# Patient Record
Sex: MALE | Race: WHITE | Employment: OTHER | URBAN - METROPOLITAN AREA
[De-identification: names, ages, dates, MRNs, and addresses within clinical notes are randomized per-mention and may not be internally consistent; named-entity substitution may affect disease eponyms.]

---

## 2017-01-04 DIAGNOSIS — E11.65 TYPE 2 DIABETES MELLITUS WITH HYPERGLYCEMIA, WITH LONG-TERM CURRENT USE OF INSULIN (HCC): ICD-10-CM

## 2017-01-04 DIAGNOSIS — E11.9 TYPE 2 DIABETES MELLITUS WITHOUT COMPLICATION (HCC): ICD-10-CM

## 2017-01-04 DIAGNOSIS — Z79.4 TYPE 2 DIABETES MELLITUS WITH HYPERGLYCEMIA, WITH LONG-TERM CURRENT USE OF INSULIN (HCC): ICD-10-CM

## 2017-01-04 RX ORDER — GLIMEPIRIDE 4 MG/1
4 TABLET ORAL
Qty: 90 TAB | Refills: 3 | Status: SHIPPED | OUTPATIENT
Start: 2017-01-04 | End: 2017-12-21 | Stop reason: SDUPTHER

## 2017-09-01 ENCOUNTER — OP HISTORICAL/CONVERTED ENCOUNTER (OUTPATIENT)
Dept: OTHER | Age: 77
End: 2017-09-01

## 2017-11-20 ENCOUNTER — OP HISTORICAL/CONVERTED ENCOUNTER (OUTPATIENT)
Dept: OTHER | Age: 77
End: 2017-11-20

## 2017-12-21 DIAGNOSIS — E11.65 TYPE 2 DIABETES MELLITUS WITH HYPERGLYCEMIA, WITH LONG-TERM CURRENT USE OF INSULIN (HCC): ICD-10-CM

## 2017-12-21 DIAGNOSIS — Z79.4 TYPE 2 DIABETES MELLITUS WITH HYPERGLYCEMIA, WITH LONG-TERM CURRENT USE OF INSULIN (HCC): ICD-10-CM

## 2017-12-21 RX ORDER — GLIMEPIRIDE 4 MG/1
TABLET ORAL
Qty: 90 TAB | Refills: 3 | Status: SHIPPED | OUTPATIENT
Start: 2017-12-21 | End: 2019-07-25

## 2018-01-17 ENCOUNTER — APPOINTMENT (OUTPATIENT)
Dept: GENERAL RADIOLOGY | Age: 78
End: 2018-01-17
Attending: EMERGENCY MEDICINE
Payer: MEDICARE

## 2018-01-17 ENCOUNTER — HOSPITAL ENCOUNTER (EMERGENCY)
Age: 78
Discharge: HOME | End: 2018-01-17
Attending: EMERGENCY MEDICINE | Admitting: EMERGENCY MEDICINE
Payer: MEDICARE

## 2018-01-17 VITALS
BODY MASS INDEX: 32.64 KG/M2 | DIASTOLIC BLOOD PRESSURE: 73 MMHG | HEART RATE: 77 BPM | SYSTOLIC BLOOD PRESSURE: 174 MMHG | WEIGHT: 228 LBS | HEIGHT: 70 IN | OXYGEN SATURATION: 97 % | RESPIRATION RATE: 18 BRPM | TEMPERATURE: 98 F

## 2018-01-17 DIAGNOSIS — S52.532A CLOSED COLLES' FRACTURE OF LEFT RADIUS, INITIAL ENCOUNTER: Primary | ICD-10-CM

## 2018-01-17 PROCEDURE — 74011000250 HC RX REV CODE- 250: Performed by: EMERGENCY MEDICINE

## 2018-01-17 PROCEDURE — 73100 X-RAY EXAM OF WRIST: CPT

## 2018-01-17 PROCEDURE — 74011250637 HC RX REV CODE- 250/637: Performed by: EMERGENCY MEDICINE

## 2018-01-17 PROCEDURE — 99285 EMERGENCY DEPT VISIT HI MDM: CPT

## 2018-01-17 PROCEDURE — 73110 X-RAY EXAM OF WRIST: CPT

## 2018-01-17 PROCEDURE — 75810000303 HC CLSD TRMT  FRACTURE/DISLOCATION W/  ANES

## 2018-01-17 PROCEDURE — A4565 SLINGS: HCPCS

## 2018-01-17 PROCEDURE — 77030028224 HC PDNG CST BSNM -A

## 2018-01-17 RX ORDER — ACETAMINOPHEN AND CODEINE PHOSPHATE 300; 30 MG/1; MG/1
1 TABLET ORAL
Status: DISCONTINUED | OUTPATIENT
Start: 2018-01-17 | End: 2018-01-17

## 2018-01-17 RX ORDER — LIDOCAINE HYDROCHLORIDE 10 MG/ML
5 INJECTION, SOLUTION EPIDURAL; INFILTRATION; INTRACAUDAL; PERINEURAL ONCE
Status: COMPLETED | OUTPATIENT
Start: 2018-01-17 | End: 2018-01-17

## 2018-01-17 RX ORDER — ACETAMINOPHEN AND CODEINE PHOSPHATE 300; 30 MG/1; MG/1
1 TABLET ORAL
Status: COMPLETED | OUTPATIENT
Start: 2018-01-17 | End: 2018-01-17

## 2018-01-17 RX ORDER — ACETAMINOPHEN AND CODEINE PHOSPHATE 300; 30 MG/1; MG/1
1 TABLET ORAL
Qty: 30 TAB | Refills: 0 | Status: SHIPPED | OUTPATIENT
Start: 2018-01-17 | End: 2018-01-19

## 2018-01-17 RX ORDER — BUPIVACAINE HYDROCHLORIDE 5 MG/ML
5 INJECTION, SOLUTION EPIDURAL; INTRACAUDAL ONCE
Status: COMPLETED | OUTPATIENT
Start: 2018-01-17 | End: 2018-01-17

## 2018-01-17 RX ADMIN — LIDOCAINE HYDROCHLORIDE 5 ML: 10 INJECTION, SOLUTION EPIDURAL; INFILTRATION; INTRACAUDAL; PERINEURAL at 20:36

## 2018-01-17 RX ADMIN — BUPIVACAINE HYDROCHLORIDE 25 MG: 5 INJECTION, SOLUTION EPIDURAL; INTRACAUDAL at 20:36

## 2018-01-17 RX ADMIN — ACETAMINOPHEN AND CODEINE PHOSPHATE 1 TABLET: 300; 30 TABLET ORAL at 20:14

## 2018-01-18 NOTE — ED NOTES
I have reviewed discharge instructions with the patient. The patient verbalized understanding. Pt confirmed understanding of need for follow up with primary care provider. Pt is not in any current distress and shows no evidence of clinical instability. Pt left ambulatory with nurse  Pt family/friends are present. Pt left with all personal belongings. Pt states they are not driving. Pt states chief complaint has improved with treatment provided    PT is alert and oriented x 4, Pt is hemodynamically/respiratorily stable. Paperwork given by provider and reviewed with patient, opportunity for questions/clarification given.

## 2018-01-18 NOTE — DISCHARGE INSTRUCTIONS
Broken Arm: Care Instructions  Your Care Instructions  Fractures can range from a small, hairline crack, to a bone or bones broken into two or more pieces. Your treatment depends on how bad the break is. Your doctor may have put your arm in a splint or cast to allow it to heal or to keep it stable until you see another doctor. It may take weeks or months for your arm to heal. You can help your arm heal with some care at home. You heal best when you take good care of yourself. Eat a variety of healthy foods, and don't smoke. You may have had a sedative to help you relax. You may be unsteady after having sedation. It can take a few hours for the medicine's effects to wear off. Common side effects of sedation include nausea, vomiting, and feeling sleepy or tired. The doctor has checked you carefully, but problems can develop later. If you notice any problems or new symptoms, get medical treatment right away. Follow-up care is a key part of your treatment and safety. Be sure to make and go to all appointments, and call your doctor if you are having problems. It's also a good idea to know your test results and keep a list of the medicines you take. How can you care for yourself at home? · If the doctor gave you a sedative:  ¨ For 24 hours, don't do anything that requires attention to detail. It takes time for the medicine's effects to completely wear off. ¨ For your safety, do not drive or operate any machinery that could be dangerous. Wait until the medicine wears off and you can think clearly and react easily. · Put ice or a cold pack on your arm for 10 to 20 minutes at a time. Try to do this every 1 to 2 hours for the next 3 days (when you are awake). Put a thin cloth between the ice and your cast or splint. Keep the cast or splint dry. · Follow the cast care instructions your doctor gives you. If you have a splint, do not take it off unless your doctor tells you to. · Be safe with medicines.  Take pain medicines exactly as directed. ¨ If the doctor gave you a prescription medicine for pain, take it as prescribed. ¨ If you are not taking a prescription pain medicine, ask your doctor if you can take an over-the-counter medicine. · Prop up your arm on pillows when you sit or lie down in the first few days after the injury. Keep the arm higher than the level of your heart. This will help reduce swelling. · Follow instructions for exercises to keep your arm strong. · Wiggle your fingers and wrist often to reduce swelling and stiffness. When should you call for help? Call 911 anytime you think you may need emergency care. For example, call if:  ? · You are very sleepy and you have trouble waking up. ?Call your doctor now or seek immediate medical care if:  ? · You have new or worse nausea or vomiting. ? · You have new or worse pain. ? · Your hand or fingers are cool or pale or change color. ? · Your cast or splint feels too tight. ? · You have tingling, weakness, or numbness in your hand or fingers. ? Watch closely for changes in your health, and be sure to contact your doctor if:  ? · You do not get better as expected. ? · You have problems with your cast or splint. Where can you learn more? Go to http://nito-maria de jesus.info/. Enter O050 in the search box to learn more about \"Broken Arm: Care Instructions. \"  Current as of: March 21, 2017  Content Version: 11.4  © 3664-0337 [employer]. Care instructions adapted under license by [employer] (which disclaims liability or warranty for this information). If you have questions about a medical condition or this instruction, always ask your healthcare professional. [place] [address] any warranty or liability for your use of this information.

## 2018-01-18 NOTE — ED PROVIDER NOTES
HPI Comments: 76 y.o. male with past medical history significant for DM, HTN, hyperlipidemia and BPH who presents from [facility] via EMS with chief complaint of wrist pain. Per pt, he was at a [place] home this evening around [facility], when he slipped on ice in the parking lot. The pt reports that he extended his left arm to break his fall, and upon impact, experienced moderate pain over his left wrist. Per pt, he did not hit his head or become syncopal during the fall. Pt makes it known that he is not currently on blood thinning medication. EMS report that the pt had positive sensation over his left wrist en route to the ED and able to move all digits over his hand per usual. Palpable radial pulse to the left noted at that time. Currently while in the ED, the pt rates his wrist pain 4/10. Per pt, he has hx of shoulder operation about eight years ago which was done by Dr. Massimo Mahan. The pt denies fever, chills, N/V/D, CP, SOB, shoulder pain, back pain, neck pain, hip pain, headache, dizziness, syncope, numbness and wound. There are no other acute medical concerns at this time. Social hx: Non-smoker, No ETOH consumption     PCP: Rosibel Deshpande MD    Note written by Deven Awan, as dictated by Lenora Aguilar MD 7:45 PM          The history is provided by the patient. No  was used.         Past Medical History:   Diagnosis Date    BPH (benign prostatic hyperplasia)     Diabetes ([address]     HTN (hypertension)     Hyperlipidemia     Type II or unspecified type diabetes mellitus without mention of complication, uncontrolled        Past Surgical History:   Procedure Laterality Date    HX ORTHOPAEDIC  L shoulder repair    HX ORTHOPAEDIC      Left shoulder         Family History:   Problem Relation Age of Onset    Diabetes Mother     Cancer Mother     Diabetes Father     Diabetes Sister        Social History     Social History    Marital status:      Spouse name: N/A   [facility] Number of children: N/A    Years of education: N/A     Occupational History    Not on file. Social History Main Topics    Smoking status: Never Smoker    Smokeless tobacco: Never Used    Alcohol use No    Drug use: No    Sexual activity: Not on file     Other Topics Concern    Not on file     Social History Narrative    ** Merged History Encounter **              ALLERGIES: Review of patient's allergies indicates no known allergies. Review of Systems   Constitutional: Negative for activity change, chills and fever. Eyes: Negative for pain. Respiratory: Negative for cough and shortness of breath. Cardiovascular: Negative for chest pain and leg swelling. Gastrointestinal: Negative for abdominal pain, diarrhea, nausea and vomiting. Genitourinary: Negative for flank pain and hematuria. Musculoskeletal: Positive for arthralgias (mild-moderate pain over left wrist following GLF on ice while in parking lot this evening  ). Negative for back pain, gait problem, neck pain and neck stiffness. Skin: Negative for color change. Neurological: Negative for dizziness, syncope, speech difficulty, numbness and headaches. Hematological: Does not bruise/bleed easily. Psychiatric/Behavioral: Negative for confusion. All other systems reviewed and are negative. Vitals:    01/17/18 1941 01/17/18 1942   BP: (!) 209/97 (!) 209/97   Pulse:  77   Resp:  18   Temp:  98 °F (36.7 °C)   SpO2:  98%   Weight:  103.4 kg (228 lb)   Height:  5' 10\" (1.778 m)            Physical Exam   Constitutional: He is oriented to person, place, and time. He appears well-developed and well-nourished. No distress. HENT:   Head: Normocephalic and atraumatic. Right Ear: External ear normal.   Left Ear: External ear normal.   Eyes: EOM are normal. Pupils are equal, round, and reactive to light. Neck: Normal range of motion. Neck supple. No JVD present. No tracheal deviation present.    Cardiovascular: Normal rate, regular rhythm and normal heart sounds. Exam reveals no gallop and no friction rub. No murmur heard. Pulmonary/Chest: Effort normal and breath sounds normal. No stridor. No respiratory distress. He has no wheezes. He has no rales. Abdominal: Soft. Bowel sounds are normal. He exhibits no distension. There is no tenderness. There is no rebound and no guarding. Musculoskeletal: Normal range of motion. He exhibits tenderness and deformity. He exhibits no edema. Pain to palpation over left radius. Slight dorsal deformity. Palpable radial pulse to the left. Neurological: He is alert and oriented to person, place, and time. He has normal reflexes. No cranial nerve deficit. Coordination normal.   Normal hand  and sensation to the LUE. Skin: Skin is warm and dry. No rash noted. He is not diaphoretic. No erythema. Psychiatric: He has a normal mood and affect. His behavior is normal. Judgment and thought content normal.   Nursing note and vitals reviewed. Note written by Deven Jett, as dictated by Kendra Giron MD 7:45 PM    [facility]  ED Course       Reduction of Joint  Date/Time: 1/17/2018 8:38 PM  Performed by: Ry Dorman by: Cecille Villalobos     Consent:     Consent obtained:  Verbal    Consent given by:  Patient  Injury:     Injury location:  Wrist    Wrist injury location:  L wrist  Pre-procedure assessment:     Neurological function: normal      Distal perfusion: normal      Range of motion: normal    Anesthesia (see MAR for exact dosages):      Anesthesia method:  Local infiltration (Hematoma block using a 10 ml 50/50 ratio of 1% lidocaine and .5% bupivacaine)    Local anesthetic:  Bupivacaine 0.5% w/o epi and lidocaine 1% w/o epi  Procedure details:     Manipulation performed: yes      Reduction successful: yes      X-ray confirmed reduction: yes      Immobilization:  Splint    Splint type:  Sugar tong  Post-procedure assessment:     Neurological function: normal      Distal perfusion: normal      Range of motion: normal      Patient tolerance of procedure: Tolerated well, no immediate complications      Splint, Sugar Tong  Date/Time: 1/17/2018 8:55 PM  Performed by: Gloria Barraza by: Rommel Torres     Consent:     Consent obtained:  Verbal    Consent given by:  Patient    Risks discussed:  Discoloration, numbness and pain    Alternatives discussed:  No treatment  Pre-procedure details:     Sensation:  Normal  Procedure details:     Laterality:  Left    Location:  Arm    Arm:  L lower arm    Splint type:  Sugar tong    Supplies:  Cotton padding and Ortho-Glass  Post-procedure details:     Pain:  Improved    Patient tolerance of procedure: Tolerated well, no immediate complications  Nerve Block  Date/Time: 1/17/2018 8:56 PM  Performed by: Rommel Torres  Authorized by: Rommel Torres     Consent:     Consent obtained:  Verbal    Consent given by:  Patient    Risks discussed:  Infection, allergic reaction and bleeding  Indications:     Indications:  Pain relief  Location:     Body area:  Upper extremity (Left wrist hematoma block)    Laterality:  Left  Pre-procedure details:     Skin preparation:  Alcohol  Procedure details (see MAR for exact dosages): Block needle gauge:  25 G    Anesthetic injected:  Bupivacaine 0.5% WITH epi and lidocaine 1% w/o epi    Steroid injected:  None    Additive injected:  None    Injection procedure:  Anatomic landmarks identified  Post-procedure details:     Outcome:  Anesthesia achieved    Patient tolerance of procedure: Tolerated well, no immediate complications        CONSULT NOTE:  8:36 PM Miriam Moura MD spoke with Dr. Chrissie Abdi [facility] for [facility]opedics. Discussed available diagnostic tests and clinical findings. They are in agreement with care plans as outlined. Dr. Chrissie Abdi is asking if we can reduce the fx in the ED. PROGRESS NOTE:  8:40 PM   Pt's left arm now hanging in finger trap for reduction. 8:57 PM  Left wrist reduced after hematoma block. I splinted left wrist with sugar tong splint. Post reduction pending. 9:16 PM  Post reduction xrays show improved alignment    Home with ortho f/u. Pt has seen Dr Shanta Bustillo in the past and I will give Dr Thalia Curling as an option too    Good return precautions given to patient. Close follow up with ortho recommended. Patient and/or family voices understanding of this plan. Discharge instructions were explained by me and all concerns were addressed.

## 2018-01-18 NOTE — ED NOTES
Assumed care of pt from [address] [place] [address]. Pt presents to ED with chief complaint of left wrist pain. Pt reports he slipped in a parking lot on the ice. Pt reports he did not get dizzy prior to the fall. Pt denies hitting his head and denies LOC. Pt denies being on blood thinners. Pt has positive sensation to left upper extremity and presents with palpable radial pulses to left wrist. Pt is A&O x 4. Pt denies any other symptoms at this time. Pt resting comfortably on the stretcher in a position of comfort. Pt in no acute distress at this time. Call bell within reach. Side rails x 2. Cardiac monitor x 2. Stretcher locked in the lowest position. Pt aware of plan to await for MD/PA-C/NP assessment, and pt/family verbalizes understanding. Will continue to monitor. Pt provided with ice pack. MD at pt's bedside.

## 2018-01-19 ENCOUNTER — ANESTHESIA (OUTPATIENT)
Dept: SURGERY | Age: 78
End: 2018-01-19
Payer: MEDICARE

## 2018-01-19 ENCOUNTER — ANESTHESIA EVENT (OUTPATIENT)
Dept: SURGERY | Age: 78
End: 2018-01-19
Payer: MEDICARE

## 2018-01-19 ENCOUNTER — APPOINTMENT (OUTPATIENT)
Dept: GENERAL RADIOLOGY | Age: 78
End: 2018-01-19
Attending: ORTHOPAEDIC SURGERY
Payer: MEDICARE

## 2018-01-19 ENCOUNTER — HOSPITAL ENCOUNTER (OUTPATIENT)
Age: 78
Setting detail: OUTPATIENT SURGERY
Discharge: HOME | End: 2018-01-19
Attending: ORTHOPAEDIC SURGERY | Admitting: ORTHOPAEDIC SURGERY
Payer: MEDICARE

## 2018-01-19 VITALS
SYSTOLIC BLOOD PRESSURE: 131 MMHG | OXYGEN SATURATION: 100 % | RESPIRATION RATE: 13 BRPM | BODY MASS INDEX: 30.86 KG/M2 | DIASTOLIC BLOOD PRESSURE: 60 MMHG | HEART RATE: 68 BPM | HEIGHT: 71 IN | TEMPERATURE: 97.5 F | WEIGHT: 220.46 LBS

## 2018-01-19 DIAGNOSIS — S52.572A OTHER CLOSED INTRA-ARTICULAR FRACTURE OF DISTAL END OF LEFT RADIUS, INITIAL ENCOUNTER: Primary | ICD-10-CM

## 2018-01-19 LAB
ANION GAP BLD CALC-SCNC: 18 MMOL/L (ref 5–15)
BUN BLD-MCNC: 27 MG/DL (ref 9–20)
CA-I BLD-MCNC: 1.17 MMOL/L (ref 1.12–1.32)
CHLORIDE BLD-SCNC: 101 MMOL/L (ref 98–107)
CO2 BLD-SCNC: 25 MMOL/L (ref 21–32)
CREAT BLD-MCNC: 1.7 MG/DL (ref 0.6–1.3)
GLUCOSE BLD STRIP.AUTO-MCNC: 104 MG/DL (ref 65–100)
GLUCOSE BLD STRIP.AUTO-MCNC: 60 MG/DL (ref 65–100)
GLUCOSE BLD STRIP.AUTO-MCNC: 69 MG/DL (ref 65–100)
GLUCOSE BLD-MCNC: 79 MG/DL (ref 65–100)
HCT VFR BLD CALC: 36 % (ref 36.6–50.3)
HGB BLD-MCNC: 12.2 GM/DL (ref 12.1–17)
POTASSIUM BLD-SCNC: 3.7 MMOL/L (ref 3.5–5.1)
SERVICE CMNT-IMP: ABNORMAL
SERVICE CMNT-IMP: NORMAL
SODIUM BLD-SCNC: 139 MMOL/L (ref 136–145)

## 2018-01-19 PROCEDURE — 74011250636 HC RX REV CODE- 250/636

## 2018-01-19 PROCEDURE — 77030035361 HC BIT DRL SLD PA GEMNS SKEL -B: Performed by: ORTHOPAEDIC SURGERY

## 2018-01-19 PROCEDURE — 77030002933 HC SUT MCRYL J&J -A: Performed by: ORTHOPAEDIC SURGERY

## 2018-01-19 PROCEDURE — C1713 ANCHOR/SCREW BN/BN,TIS/BN: HCPCS | Performed by: ORTHOPAEDIC SURGERY

## 2018-01-19 PROCEDURE — 74011000250 HC RX REV CODE- 250

## 2018-01-19 PROCEDURE — 76210000020 HC REC RM PH II FIRST 0.5 HR: Performed by: ORTHOPAEDIC SURGERY

## 2018-01-19 PROCEDURE — 77030031388 HC WRE K SKEL -B: Performed by: ORTHOPAEDIC SURGERY

## 2018-01-19 PROCEDURE — 77030020782 HC GWN BAIR PAWS FLX 3M -B

## 2018-01-19 PROCEDURE — 74011250636 HC RX REV CODE- 250/636: Performed by: ANESTHESIOLOGY

## 2018-01-19 PROCEDURE — 82962 GLUCOSE BLOOD TEST: CPT

## 2018-01-19 PROCEDURE — 74011250636 HC RX REV CODE- 250/636: Performed by: ORTHOPAEDIC SURGERY

## 2018-01-19 PROCEDURE — 76000 FLUOROSCOPY <1 HR PHYS/QHP: CPT

## 2018-01-19 PROCEDURE — 76010000153 HC OR TIME 1.5 TO 2 HR: Performed by: ORTHOPAEDIC SURGERY

## 2018-01-19 PROCEDURE — 77030021122 HC SPLNT MAT FST BSNM -A: Performed by: ORTHOPAEDIC SURGERY

## 2018-01-19 PROCEDURE — 77030020268 HC MISC GENERAL SUPPLY: Performed by: ORTHOPAEDIC SURGERY

## 2018-01-19 PROCEDURE — 77030038840 HC GD AIM SKEL -B: Performed by: ORTHOPAEDIC SURGERY

## 2018-01-19 PROCEDURE — 77030003601 HC NDL NRV BLK BBMI -A

## 2018-01-19 PROCEDURE — L4398 FOOT DROP SPLINT PRE OTS: HCPCS | Performed by: ORTHOPAEDIC SURGERY

## 2018-01-19 PROCEDURE — 77030031139 HC SUT VCRL2 J&J -A: Performed by: ORTHOPAEDIC SURGERY

## 2018-01-19 PROCEDURE — 80047 BASIC METABLC PNL IONIZED CA: CPT

## 2018-01-19 PROCEDURE — 76060000034 HC ANESTHESIA 1.5 TO 2 HR: Performed by: ORTHOPAEDIC SURGERY

## 2018-01-19 PROCEDURE — 77030035360 HC BIT DRL SKEL -B: Performed by: ORTHOPAEDIC SURGERY

## 2018-01-19 PROCEDURE — 77030000032 HC CUF TRNQT ZIMM -B: Performed by: ORTHOPAEDIC SURGERY

## 2018-01-19 PROCEDURE — 76210000006 HC OR PH I REC 0.5 TO 1 HR: Performed by: ORTHOPAEDIC SURGERY

## 2018-01-19 DEVICE — SCR CORT LCK 3.5X14MM -- GEMINUS: Type: IMPLANTABLE DEVICE | Site: ULNA | Status: FUNCTIONAL

## 2018-01-19 DEVICE — PEG BNE FIX L20MM DIA2MM DST RAD TI SMOOTH FOR VOLAR: Type: IMPLANTABLE DEVICE | Site: ULNA | Status: FUNCTIONAL

## 2018-01-19 DEVICE — SCR CORT LCK 3.5X11MM --: Type: IMPLANTABLE DEVICE | Site: ULNA | Status: FUNCTIONAL

## 2018-01-19 DEVICE — IMPLANTABLE DEVICE: Type: IMPLANTABLE DEVICE | Site: ULNA | Status: FUNCTIONAL

## 2018-01-19 DEVICE — PEG BNE FIX L22MM DIA2MM DST RAD TI SMOOTH FOR VOLAR: Type: IMPLANTABLE DEVICE | Site: ULNA | Status: FUNCTIONAL

## 2018-01-19 DEVICE — PEG BNE FIX L20MM DIA2.3MM DST RAD TI THRD LOK FOR VOLAR: Type: IMPLANTABLE DEVICE | Site: ULNA | Status: FUNCTIONAL

## 2018-01-19 DEVICE — PEG BNE FIX L22MM DIA2.7MM DST RAD TI THRD NONLOCKING FOR: Type: IMPLANTABLE DEVICE | Site: ULNA | Status: FUNCTIONAL

## 2018-01-19 RX ORDER — CEPHALEXIN 500 MG/1
500 CAPSULE ORAL 4 TIMES DAILY
Qty: 12 CAP | Refills: 0 | Status: SHIPPED | OUTPATIENT
Start: 2018-01-19 | End: 2018-01-22

## 2018-01-19 RX ORDER — SODIUM CHLORIDE, SODIUM LACTATE, POTASSIUM CHLORIDE, CALCIUM CHLORIDE 600; 310; 30; 20 MG/100ML; MG/100ML; MG/100ML; MG/100ML
150 INJECTION, SOLUTION INTRAVENOUS CONTINUOUS
Status: DISCONTINUED | OUTPATIENT
Start: 2018-01-19 | End: 2018-01-19 | Stop reason: HOSPADM

## 2018-01-19 RX ORDER — CEFAZOLIN SODIUM IN 0.9 % NACL 2 G/50 ML
2 INTRAVENOUS SOLUTION, PIGGYBACK (ML) INTRAVENOUS ONCE
Status: DISCONTINUED | OUTPATIENT
Start: 2018-01-19 | End: 2018-01-19 | Stop reason: HOSPADM

## 2018-01-19 RX ORDER — SODIUM CHLORIDE, SODIUM LACTATE, POTASSIUM CHLORIDE, CALCIUM CHLORIDE 600; 310; 30; 20 MG/100ML; MG/100ML; MG/100ML; MG/100ML
125 INJECTION, SOLUTION INTRAVENOUS CONTINUOUS
Status: DISCONTINUED | OUTPATIENT
Start: 2018-01-19 | End: 2018-01-19 | Stop reason: HOSPADM

## 2018-01-19 RX ORDER — ROPIVACAINE HYDROCHLORIDE 5 MG/ML
INJECTION, SOLUTION EPIDURAL; INFILTRATION; PERINEURAL AS NEEDED
Status: DISCONTINUED | OUTPATIENT
Start: 2018-01-19 | End: 2018-01-19 | Stop reason: HOSPADM

## 2018-01-19 RX ORDER — LIDOCAINE HYDROCHLORIDE 10 MG/ML
0.1 INJECTION, SOLUTION EPIDURAL; INFILTRATION; INTRACAUDAL; PERINEURAL AS NEEDED
Status: DISCONTINUED | OUTPATIENT
Start: 2018-01-19 | End: 2018-01-19 | Stop reason: HOSPADM

## 2018-01-19 RX ORDER — SODIUM CHLORIDE, SODIUM LACTATE, POTASSIUM CHLORIDE, CALCIUM CHLORIDE 600; 310; 30; 20 MG/100ML; MG/100ML; MG/100ML; MG/100ML
INJECTION, SOLUTION INTRAVENOUS
Status: DISCONTINUED | OUTPATIENT
Start: 2018-01-19 | End: 2018-01-19 | Stop reason: HOSPADM

## 2018-01-19 RX ORDER — CEFAZOLIN SODIUM IN 0.9 % NACL 2 G/50 ML
2 INTRAVENOUS SOLUTION, PIGGYBACK (ML) INTRAVENOUS ONCE
Status: COMPLETED | OUTPATIENT
Start: 2018-01-19 | End: 2018-01-19

## 2018-01-19 RX ORDER — ONDANSETRON 2 MG/ML
4 INJECTION INTRAMUSCULAR; INTRAVENOUS AS NEEDED
Status: DISCONTINUED | OUTPATIENT
Start: 2018-01-19 | End: 2018-01-19 | Stop reason: HOSPADM

## 2018-01-19 RX ORDER — MIDAZOLAM HYDROCHLORIDE 1 MG/ML
INJECTION, SOLUTION INTRAMUSCULAR; INTRAVENOUS AS NEEDED
Status: DISCONTINUED | OUTPATIENT
Start: 2018-01-19 | End: 2018-01-19 | Stop reason: HOSPADM

## 2018-01-19 RX ORDER — CEFAZOLIN SODIUM IN 0.9 % NACL 2 G/50 ML
2 INTRAVENOUS SOLUTION, PIGGYBACK (ML) INTRAVENOUS
Status: COMPLETED | OUTPATIENT
Start: 2018-01-19 | End: 2018-01-19

## 2018-01-19 RX ORDER — ONDANSETRON 8 MG/1
8 TABLET, ORALLY DISINTEGRATING ORAL
Qty: 20 TAB | Refills: 2 | OUTPATIENT
Start: 2018-01-19 | End: 2018-06-21

## 2018-01-19 RX ORDER — ALBUTEROL SULFATE 0.83 MG/ML
2.5 SOLUTION RESPIRATORY (INHALATION) AS NEEDED
Status: DISCONTINUED | OUTPATIENT
Start: 2018-01-19 | End: 2018-01-19 | Stop reason: HOSPADM

## 2018-01-19 RX ORDER — PROPOFOL 10 MG/ML
INJECTION, EMULSION INTRAVENOUS
Status: DISCONTINUED | OUTPATIENT
Start: 2018-01-19 | End: 2018-01-19 | Stop reason: HOSPADM

## 2018-01-19 RX ORDER — OXYCODONE AND ACETAMINOPHEN 5; 325 MG/1; MG/1
TABLET ORAL
Qty: 80 TAB | Refills: 0 | OUTPATIENT
Start: 2018-01-19 | End: 2018-06-21

## 2018-01-19 RX ORDER — HYDROMORPHONE HYDROCHLORIDE 2 MG/ML
0.5 INJECTION, SOLUTION INTRAMUSCULAR; INTRAVENOUS; SUBCUTANEOUS
Status: DISCONTINUED | OUTPATIENT
Start: 2018-01-19 | End: 2018-01-19 | Stop reason: HOSPADM

## 2018-01-19 RX ORDER — DIPHENHYDRAMINE HYDROCHLORIDE 50 MG/ML
12.5 INJECTION, SOLUTION INTRAMUSCULAR; INTRAVENOUS AS NEEDED
Status: DISCONTINUED | OUTPATIENT
Start: 2018-01-19 | End: 2018-01-19 | Stop reason: HOSPADM

## 2018-01-19 RX ORDER — FENTANYL CITRATE 50 UG/ML
INJECTION, SOLUTION INTRAMUSCULAR; INTRAVENOUS AS NEEDED
Status: DISCONTINUED | OUTPATIENT
Start: 2018-01-19 | End: 2018-01-19 | Stop reason: HOSPADM

## 2018-01-19 RX ORDER — GLYCOPYRROLATE 0.2 MG/ML
INJECTION INTRAMUSCULAR; INTRAVENOUS AS NEEDED
Status: DISCONTINUED | OUTPATIENT
Start: 2018-01-19 | End: 2018-01-19 | Stop reason: HOSPADM

## 2018-01-19 RX ORDER — ASCORBIC ACID 500 MG
500 TABLET ORAL DAILY
Qty: 42 TAB | Refills: 0 | OUTPATIENT
Start: 2018-01-19 | End: 2018-03-02

## 2018-01-19 RX ADMIN — PROPOFOL 50 MCG/KG/MIN: 10 INJECTION, EMULSION INTRAVENOUS at 16:13

## 2018-01-19 RX ADMIN — SODIUM CHLORIDE, SODIUM LACTATE, POTASSIUM CHLORIDE, AND CALCIUM CHLORIDE 150 ML/HR: 600; 310; 30; 20 INJECTION, SOLUTION INTRAVENOUS at 15:32

## 2018-01-19 RX ADMIN — FENTANYL CITRATE 25 MCG: 50 INJECTION, SOLUTION INTRAMUSCULAR; INTRAVENOUS at 16:18

## 2018-01-19 RX ADMIN — CEFAZOLIN 2 G: 1 INJECTION, POWDER, FOR SOLUTION INTRAMUSCULAR; INTRAVENOUS; PARENTERAL at 18:08

## 2018-01-19 RX ADMIN — SODIUM CHLORIDE, SODIUM LACTATE, POTASSIUM CHLORIDE, CALCIUM CHLORIDE: 600; 310; 30; 20 INJECTION, SOLUTION INTRAVENOUS at 16:08

## 2018-01-19 RX ADMIN — MIDAZOLAM HYDROCHLORIDE 2 MG: 1 INJECTION, SOLUTION INTRAMUSCULAR; INTRAVENOUS at 15:37

## 2018-01-19 RX ADMIN — MIDAZOLAM HYDROCHLORIDE 1 MG: 1 INJECTION, SOLUTION INTRAMUSCULAR; INTRAVENOUS at 16:06

## 2018-01-19 RX ADMIN — MIDAZOLAM HYDROCHLORIDE 1 MG: 1 INJECTION, SOLUTION INTRAMUSCULAR; INTRAVENOUS at 16:18

## 2018-01-19 RX ADMIN — GLYCOPYRROLATE 0.1 MG: 0.2 INJECTION INTRAMUSCULAR; INTRAVENOUS at 16:54

## 2018-01-19 RX ADMIN — FENTANYL CITRATE 50 MCG: 50 INJECTION, SOLUTION INTRAMUSCULAR; INTRAVENOUS at 15:43

## 2018-01-19 RX ADMIN — ROPIVACAINE HYDROCHLORIDE 30 ML: 5 INJECTION, SOLUTION EPIDURAL; INFILTRATION; PERINEURAL at 15:45

## 2018-01-19 RX ADMIN — SODIUM CHLORIDE, SODIUM LACTATE, POTASSIUM CHLORIDE, CALCIUM CHLORIDE: 600; 310; 30; 20 INJECTION, SOLUTION INTRAVENOUS at 16:45

## 2018-01-19 RX ADMIN — FENTANYL CITRATE 50 MCG: 50 INJECTION, SOLUTION INTRAMUSCULAR; INTRAVENOUS at 15:37

## 2018-01-19 RX ADMIN — FENTANYL CITRATE 50 MCG: 50 INJECTION, SOLUTION INTRAMUSCULAR; INTRAVENOUS at 16:06

## 2018-01-19 RX ADMIN — CEFAZOLIN 2 G: 1 INJECTION, POWDER, FOR SOLUTION INTRAMUSCULAR; INTRAVENOUS; PARENTERAL at 16:08

## 2018-01-19 RX ADMIN — GLYCOPYRROLATE 0.1 MG: 0.2 INJECTION INTRAMUSCULAR; INTRAVENOUS at 17:11

## 2018-01-19 NOTE — H&P
Orthopedic Admission History and Physical        NAME: Brynn Narayan       :  1940       MRN:  [id number]      Subjective:     Patient is a 76 y.o. male who presents with history of L DRF and middle phalanx fx long finger. Presents today for surgical treatment. Patient Active Problem List    Diagnosis Date Noted    Encounter for long-term (current) use of insulin ([facility] 2016    Hypothyroidism due to acquired atrophy of thyroid 2015    Essential hypertension 2015    Type 2 diabetes mellitus with diabetic nephropathy ([facility] 2015    Obesity 2015    Type II or unspecified type diabetes mellitus without mention of complication, uncontrolled 2014     Past Medical History:   Diagnosis Date    BPH (benign prostatic hyperplasia)     Diabetes ([facility]     HTN (hypertension)     Hyperlipidemia     Type II or unspecified type diabetes mellitus without mention of complication, uncontrolled       Past Surgical History:   Procedure Laterality Date    HX ORTHOPAEDIC  L shoulder repair    HX ORTHOPAEDIC      Left shoulder      Prior to Admission medications    Medication Sig Start Date End Date Taking? Authorizing Provider   acetaminophen-codeine (TYLENOL-CODEINE #3) 300-30 mg per tablet Take 1 Tab by mouth every six (6) hours as needed for Pain. Max Daily Amount: 4 Tabs.  18   Bartolome Castro MD   glimepiride (AMARYL) 4 mg tablet TAKE 1 TABLET EVERY MORNING 17   Jasper Saleh MD   LANTUS SOLOSTAR 100 unit/mL (3 mL) pen INJECT 24 UNITS AT 9PM 16   Jasper Saleh MD   insulin glargine (LANTUS SOLOSTAR) 100 unit/mL (3 mL) pen Inject 40 units at  9 PM 16   Jasper Saleh MD   atenolol (TENORMIN) 50 mg tablet  16   Historical Provider   fenofibrate micronized (LOFIBRA) 134 mg capsule  2/3/16   Historical Provider   RUBENS PEN NEEDLE 32 x 5/32 \" ndle USE TO INJECT LANTUTS DAILY 9/27/15   Jasper Saleh MD   metFORMIN (GLUCOPHAGE) 1,000 mg tablet Take 1 Tab by mouth two (2) times daily (with meals). Patient taking differently: Take 1,000 mg by mouth daily (with dinner). 8/3/15   Spenser Orr MD   insulin lispro (HUMALOG) 100 unit/mL kwikpen Sample 2/20/15   Spenser rOr MD   Lancets misc Use to check blood sugar 3 times daily 11/18/14   Spenser Orr MD   glucose blood VI test strips (ONE TOUCH ULTRA TEST) strip Use to test blood sugar 3 times daily 11/18/14   Spenser Orr MD   glucose blood VI test strips (ASCENSIA AUTODISC VI, ONE TOUCH ULTRA TEST VI) strip Use to test blood sugar 3 times daily 9/29/14   Spenser Orr MD   lisinopril (PRINIVIL, ZESTRIL) 40 mg tablet Take 40 mg by mouth daily. Historical Provider   levothyroxine (SYNTHROID) 50 mcg tablet Take  by mouth Daily (before breakfast). Historical Provider   omega-3 fatty acids-vitamin e (FISH OIL) 1,000 mg cap Take 1 Cap by mouth. Historical Provider   aspirin delayed-release 81 mg tablet Take  by mouth daily. Historical Provider   terazosin (HYTRIN) 2 mg capsule Take 2 mg by mouth nightly. Historical Provider     Current Facility-Administered Medications   Medication Dose Route Frequency    lactated Ringers infusion  150 mL/hr IntraVENous CONTINUOUS    lidocaine (PF) (XYLOCAINE) 10 mg/mL (1 %) injection 0.1 mL  0.1 mL SubCUTAneous PRN    ceFAZolin in 0.9% NS (ANCEF) IVPB soln 2 g  2 g IntraVENous ON CALL TO OR      No Known Allergies   Social History   Substance Use Topics    Smoking status: Never Smoker    Smokeless tobacco: Never Used    Alcohol use No      Family History   Problem Relation Age of Onset    Diabetes Mother     Cancer Mother     Diabetes Father     Diabetes Sister         Review of Systems  A comprehensive review of systems was negative except for that written in the HPI.     Objective:     Patient Vitals for the past 8 hrs:   Height Weight   01/19/18 1452 5' 11\" (1.803 m) 100 kg (220 lb 7.4 oz)     No data recorded. Physical Exam:  General appearance: alert, cooperative, no distress, appears stated age  Lungs: No use of accessory breathing muscles. Breathing unlabored. Cardiac: Regular rate. Abdomen: soft, non-tender, non-distended  Extremities: As per prior exam.     Labs: No results found for this or any previous visit (from the past 24 hour(s)). Assessment:   No medical contraindications to proceeding with planned surgery. Please see initial office note for full discussion of risks, benefits, and alternatives to surgery. Patient Active Problem List    Diagnosis Date Noted    [facility] 07/02/2016    Hypothyroidism due to acquired atrophy of thyroid 11/18/2015    Essential hypertension 11/18/2015    Type 2 diabetes mellitus with diabetic nephropathy ([facility] 11/18/2015    Obesity 02/16/2015    Type II or unspecified type diabetes mellitus without mention of complication, uncontrolled 09/29/2014         Plan:   Proceed with surgery  Pt. stable  Pt.  NPO x meds

## 2018-01-19 NOTE — DISCHARGE INSTRUCTIONS
Upper Extremity Surgery Discharge Instructions  Dr. Catina Saleh    Please take the time to review the following instructions before you leave the hospital and use them as guidelines during your recovery from surgery. If you have any questions, you may contact my office at [phone]. Wound Care / Dressing Change    Do NOT remove your dressing or get them wet. [place] / Bathing    May bathe/shower as long as dressing/splint/cast is kept dry. Sling    You are not required to wear a sling and should do so only as needed for comfort. You may remove your sling once the block wears off, which may be anywhere from 8-48 hrs after surgery. Activity    No lifting with your affected hand. Otherwise, you may proceed with activity as tolerated. No driving until you receive further notice otherwise. Ice and Elevation    Keep your hand elevated continuously for 48 hours after surgery using the pillow provided. Your hand/wrist should always be above the level of your heart. Sleep with your arm elevated to minimize swelling and discomfort. Continue ice consistently for 48 hours after surgery. After 48 hours, you should ice 3 times per day for 20 minutes at a time for the next 5 days. After 1 week from surgery, you may use ice as needed for pain. Diet    You may advance your regular diet as tolerated. Increase your clear liquid intake for the next 2-3 days. Medications    1. You will be given prescriptions for pain medication, and nausea when you are discharged from the hospital. Please use the medications as prescribed. Pain medications may cause constipation - over the counter Colace or Milk of Magnesia may be used as needed. Other possible side effects of pain medications are dizziness, headache, nausea, vomiting, and urinary retention. Discontinue the pain medication if you develop itching, rash, shortness of breath, or difficulties swallowing.  If these symptoms become severe or arent relieved by discontinuing the medication, you should seek immediate medical attention. 2. Refills of pain medication are authorized during office hours only (8AM - 5PM Monday through Friday)  3. If you were prescribed Percocet/Oxycodone or Dilaudid/Hydromorphone you must have a written prescription. These medications cannot be leagally called into the pharmacy. 4. Do not take Tylenol/Acetaminophen in addition to your pain medication, as most pain medications already contain this. Do not exceed 4000mg of Tylenol/Acetaminophen per day. 5. You may resume the medication you were taking prior to your surgery. Pain medication may change the effects of any antidepressant medication you may be taking. If you have any questions about possible interactions between your regular medication and the pain medication, you should consult the physician who prescribes your regular medications. 6. You were prescribed a nausea medication. It is only necessary to fill this if you are experiencing nausea. Please call the office at [phone] if you have any increasing numbness or tingling, increasing drainage on your dressing, fever greater than 100.5 degrees F or pain not controlled by medications. If you are experiencing chest pain or shortness of breath, please alert your primary care physician immediately. DISCHARGE SUMMARY from your Nurse    The following personal items collected during your admission are returned to you:   Dental Appliance: Dental Appliances: Uppers, Lowers, At home  Vision: Visual Aid: Glasses  Hearing Aid:    Jewelry: Jewelry: None  Clothing: Clothing: Other (comment) (street clothes to locker)  Other Valuables:  Other Valuables: None  Valuables sent to safe:      PATIENT INSTRUCTIONS:    After general anesthesia or intravenous sedation, for 24 hours or while taking prescription Narcotics:  · Limit your activities  · Do not drive and operate hazardous machinery  · Do not make important personal or business decisions  · Do  not drink alcoholic beverages  · If you have not urinated within 8 hours after discharge, please contact your surgeon on call. Report the following to your surgeon:  · Excessive pain, swelling, redness or odor of or around the surgical area  · Temperature over 100.5  · Nausea and vomiting lasting longer than 4 hours or if unable to take medications  · Any signs of decreased circulation or nerve impairment to extremity: change in color, persistent  numbness, tingling, coldness or increase pain  · Any questions    COUGH AND DEEP BREATHE    Breathing deep and coughing are very important exercises to do after surgery. Deep breathing and coughing open the little air tubes and air sacks in your lungs. You take deep breaths every day. You may not even notice - it is just something you do when you sigh or yawn. It is a natural exercise you do to keep these air passages open. After surgery, take deep breaths and cough, on purpose. Coughing and deep breathing help prevent bronchitis and pneumonia after surgery. If you had chest or belly surgery, use a pillow as a \"hug buddy\" and hold it tightly to your chest or belly when you cough. DIRECTIONS:  6. Take 10 to 15 slow deep breaths every hour while awake. 7. Breathe in deeply, and hold it for 2 seconds. 8. Exhale slowly through puckered lips, like blowing up a balloon. 9. After every 4th or 5th deep breath, hug your pillow to your chest or belly and give a hard, deep cough. Yes, it will probably hurt. But doing this exercise is very important part of healing after surgery. Take your pain medicine to help you do this exercise without too much pain. IF YOU HAVE BEEN DIAGNOSED WITH SLEEP APNEA, PLEASE USE YOUR SLEEP APNEA DEVICE OR CPAP MACHINE WHEN YOU INTEND TO NAP AFTER TAKING PAIN MEDICATION.     Ankle Pumps    Ankle pumps increase the circulation of oxygenated blood to your lower extremities and decrease your risk for circulation problems such as blood clots. They also stretch the muscles, tendons and ligaments in your foot and ankle, and prevent joint contracture in the ankle and foot, especially after surgeries on the legs. It is important to do ankle pump exercises regularly after surgery because immobility increases your risk for developing a blood clot. Your doctor may also have you take an Aspirin for the next few days as well. If your doctor did not ask you to take an Aspirin, consult with him before starting Aspirin therapy on your own. Slowly point your foot forward, feeling the muscles on the top of your lower leg stretch, and hold this position for 5 seconds. Next, pull your foot back toward you as far as possible, stretching the calf muscles, and hold that position for 5 seconds. Repeat with the other foot. Perform 10 repetitions every hour while awake for both ankles if possible (down and then up with the foot once is one repetition). You should feel gentle stretching of the muscles in your lower leg when doing this exercise. If you feel pain, or your range of motion is limited, don't  Push too hard. Only go the limit your joint and muscles will let you go. If you have increasing pain, progressively worsening leg warmth or swelling, STOP the exercise and call your doctor. Below is information about the medications your doctor is prescribing after your visit:    Other information in your discharge envelope:  []     PRESCRIPTIONS  []     PHYSICAL THERAPY PRESCRIPTION  []     APPOINTMENT CARDS  []     Regional Anesthesia Pamphlet for block or block with On-Q Catheter from Anesthesia Service  []     Medical device information sheets/pamphlets from their    []     School/work excuse note. []     /parent work excuse note.       These are general instructions for a healthy lifestyle:    *  Please give a list of your current medications to your Primary Care Provider. *  Please update this list whenever your medications are discontinued, doses are      changed, or new medications (including over-the-counter products) are added. *  Please carry medication information at all times in case of emergency situations. About Smoking  No smoking / No tobacco products / Avoid exposure to second hand smoke    Surgeon General's Warning:  Quitting smoking now greatly reduces serious risk to your health. Obesity, smoking, and sedentary lifestyle greatly increases your risk for illness and disease. A healthy diet, regular physical exercise & weight monitoring are important for maintaining a healthy lifestyle. Congestive Heart Failure  You may be retaining fluid if you have a history of heart failure or if you experience any of the following symptoms:  Weight gain of 3 pounds or more overnight or 5 pounds in a week, increased swelling in our hands or feet or shortness of breath while lying flat in bed. Please call your doctor as soon as you notice any of these symptoms; do not wait until your next office visit. Recognize signs and symptoms of STROKE:  F - face looks uneven  A - arms unable to move or move even  S - speech slurred or non-existent  T - time-call 911 as soon as signs and symptoms begin-DO NOT go         Back to bed or wait to see if you get better-TIME IS BRAIN. Warning signs of HEART ATTACK  Call 911 if you have these symptoms    · Chest discomfort. Most heart attacks involve discomfort in the center of the chest that lasts more than a few minutes, or that goes away and comes back. It can feel like uncomfortable pressure, squeezing, fullness, or pain. · Discomfort in other areas of the upper body. Symptoms can include pain or discomfort in one or both        Arms, the back, neck, jaw, or stomach. ·  Shortness of breath with or without chest discomfort.   · Other signs may include breaking out in a cold sweat, nausea, or lightheadedness    Don't wait more than five minutes to call 911 - MINUTES MATTER! Fast action can save your life. Calling 911 is almost always the fastest way to get lifesaving treatment. Emergency Medical Services staff can begin treatment when they arrive - up to an hour sooner than if someone gets to the hospital by car. [facility] MEDICATION AND SIDE EFFECT GUIDE    The [facility] MEDICATION AND SIDE EFFECT GUIDE was provided to the PATIENT AND CARE PROVIDER.   Information provided includes instruction about drug purpose and common side effects for the following medications:    · Oxycodone-acetaminophen (PERCOCET)  · Ondansetron (ZOFRAN)  · docusate (COLACE)  · Vitamin C  · Cephalexin (Feliberto Jc)

## 2018-01-19 NOTE — IP AVS SNAPSHOT
[address] [facility] [address] 
 
 
 [address] [facility] 
[phone] Patient: Keiko Webb MRN: [id number] About your hospitalization You were admitted on:  January 19, 2018 You last received care in the:  1FM SURGERY You were discharged on:  January 19, 2018 Why you were hospitalized Your primary diagnosis was:  Not on File Follow-up Information Follow up With Details Comments Contact Info Renato Gavin MD In 1 week  [address] [facility] [facility] 
[phone] Quincy Mireles MD   [address] [facility] [address] [phone] Discharge Orders None A check wale indicates which time of day the medication should be taken. My Medications START taking these medications Instructions Each Dose to Equal  
 Morning Noon Evening Bedtime  
 ascorbic acid (vitamin C) 500 mg tablet Commonly known as:  VITAMIN C Your last dose was: Your next dose is: Take 1 Tab by mouth daily for 42 days. 500 mg  
    
   
   
   
  
 cephALEXin 500 mg capsule Commonly known as:  Travis Ego Your last dose was: Your next dose is: Take 1 Cap by mouth four (4) times daily for 3 days. 500 mg  
    
   
   
   
  
 docusate sodium 50 mg capsule Commonly known as:  Miesha Palin Your last dose was: Your next dose is: Take two tabs twice daily for two weeks, then twice a day as needed thereafter. Hold for loose stools. ondansetron 8 mg disintegrating tablet Commonly known as:  ZOFRAN ODT Your last dose was: Your next dose is: Take 1 Tab by mouth every eight (8) hours as needed for Nausea. 8 mg  
    
   
   
   
  
 oxyCODONE-acetaminophen 5-325 mg per tablet Commonly known as:  PERCOCET Your last dose was: Your next dose is:    
   
   
 1-2 tabs every 4hrs as needed for pain. Maximum daily dose 12 tablets. CHANGE how you take these medications Instructions Each Dose to Equal  
 Morning Noon Evening Bedtime  
 metFORMIN 1,000 mg tablet Commonly known as:  GLUCOPHAGE What changed:  when to take this Your last dose was: Your next dose is: Take 1 Tab by mouth two (2) times daily (with meals). 1000 mg CONTINUE taking these medications Instructions Each Dose to Equal  
 Morning Noon Evening Bedtime  
 aspirin delayed-release 81 mg tablet Your last dose was: Your next dose is: Take  by mouth daily. atenolol 50 mg tablet Commonly known as:  TENORMIN Your last dose was: Your next dose is:    
   
   
      
   
   
   
  
 fenofibrate micronized 134 mg capsule Commonly known as:  LOFIBRA Your last dose was: Your next dose is: FISH OIL 1,000 mg Cap Generic drug:  omega-3 fatty acids-vitamin e Your last dose was: Your next dose is: Take 1 Cap by mouth. 1 Cap  
    
   
   
   
  
 glimepiride 4 mg tablet Commonly known as:  AMARYL Your last dose was: Your next dose is: TAKE 1 TABLET EVERY MORNING  
     
   
   
   
  
 * glucose blood VI test strips strip Commonly known as:  ASCENSIA AUTODISC VI, ONE TOUCH ULTRA TEST VI Your last dose was: Your next dose is:    
   
   
 Use to test blood sugar 3 times daily * glucose blood VI test strips strip Commonly known as:  ONETOUCH ULTRA TEST Your last dose was: Your next dose is:    
   
   
 Use to test blood sugar 3 times daily Lancets Misc Your last dose was: Your next dose is: Use to check blood sugar 3 times daily  
     
   
   
   
  
 lisinopril 40 mg tablet Commonly known as:  Flory Kenneth Your last dose was: Your next dose is: Take 40 mg by mouth daily. 40 mg Sharon Pen Needle 32 gauge x 5/32\" Ndle Generic drug:  Insulin Needles (Disposable) Your last dose was: Your next dose is:    
   
   
 USE TO INJECT LANTUTS DAILY  
     
   
   
   
  
 synthroid 50 mcg tablet Generic drug:  levothyroxine Your last dose was: Your next dose is: Take  by mouth Daily (before breakfast). terazosin 2 mg capsule Commonly known as:  HYTRIN Your last dose was: Your next dose is: Take 2 mg by mouth nightly. 2 mg * Notice: This list has 2 medication(s) that are the same as other medications prescribed for you. Read the directions carefully, and ask your doctor or other care provider to review them with you. STOP taking these medications   
 acetaminophen-codeine 300-30 mg per tablet Commonly known as:  TYLENOL-CODEINE #3  
   
  
  
ASK your doctor about these medications Instructions Each Dose to Equal  
 Morning Noon Evening Bedtime FISH  mg Cap Generic drug:  Omega-3 Fatty Acids Your last dose was: Your next dose is: Take 1,000 mg by mouth daily. 1000 mg LEVEMIR 100 unit/mL injection Generic drug:  insulin detemir Your last dose was: Your next dose is:    
   
   
 65 Units by SubCUTAneous route nightly. 65 Units Where to Get Your Medications Information on where to get these meds will be given to you by the nurse or doctor. ! Ask your nurse or doctor about these medications  
  ascorbic acid (vitamin C) 500 mg tablet  
 cephALEXin 500 mg capsule  
 docusate sodium 50 mg capsule ondansetron 8 mg disintegrating tablet  
 oxyCODONE-acetaminophen 5-325 mg per tablet Discharge Instructions Upper Extremity Surgery Discharge Instructions Dr. Mine Aguayo Please take the time to review the following instructions before you leave the hospital and use them as guidelines during your recovery from surgery. If you have any questions, you may contact my office at [phone]. Wound Care / Dressing Change Do NOT remove your dressing or get them wet. Yue Grimm / Pamela Summers May bathe/shower as long as dressing/splint/cast is kept dry. Sling You are not required to wear a sling and should do so only as needed for comfort. You may remove your sling once the block wears off, which may be anywhere from 8-48 hrs after surgery. Activity No lifting with your affected hand. Otherwise, you may proceed with activity as tolerated. No driving until you receive further notice otherwise. Ice and Elevation Keep your hand elevated continuously for 48 hours after surgery using the pillow provided. Your hand/wrist should always be above the level of your heart. Sleep with your arm elevated to minimize swelling and discomfort. Continue ice consistently for 48 hours after surgery. After 48 hours, you should ice 3 times per day for 20 minutes at a time for the next 5 days. After 1 week from surgery, you may use ice as needed for pain. Diet You may advance your regular diet as tolerated. Increase your clear liquid intake for the next 2-3 days. Medications 1. You will be given prescriptions for pain medication, and nausea when you are discharged from the hospital. Please use the medications as prescribed. Pain medications may cause constipation  over the counter Colace or Milk of Magnesia may be used as needed.  Other possible side effects of pain medications are dizziness, headache, nausea, vomiting, and urinary retention. Discontinue the pain medication if you develop itching, rash, shortness of breath, or difficulties swallowing. If these symptoms become severe or arent relieved by discontinuing the medication, you should seek immediate medical attention. 2. Refills of pain medication are authorized during office hours only (8AM  5PM Monday through Friday) 3. If you were prescribed Percocet/Oxycodone or Dilaudid/Hydromorphone you must have a written prescription. These medications cannot be leagally called into the pharmacy. 4. Do not take Tylenol/Acetaminophen in addition to your pain medication, as most pain medications already contain this. Do not exceed 4000mg of Tylenol/Acetaminophen per day. 5. You may resume the medication you were taking prior to your surgery. Pain medication may change the effects of any antidepressant medication you may be taking. If you have any questions about possible interactions between your regular medication and the pain medication, you should consult the physician who prescribes your regular medications. 6. You were prescribed a nausea medication. It is only necessary to fill this if you are experiencing nausea. Please call the office at [phone] if you have any increasing numbness or tingling, increasing drainage on your dressing, fever greater than 100.5 degrees F or pain not controlled by medications. If you are experiencing chest pain or shortness of breath, please alert your primary care physician immediately. DISCHARGE SUMMARY from your Nurse The following personal items collected during your admission are returned to you:  
Dental Appliance: Dental Appliances: Uppers, Lowers, At home Vision: Visual Aid: Glasses Hearing Aid:   
Jewelry: Jewelry: None Clothing: Clothing: Other (comment) (street clothes to locker) Other Valuables: Other Valuables: None Valuables sent to safe:   
 
PATIENT INSTRUCTIONS: 
 
 After general anesthesia or intravenous sedation, for 24 hours or while taking prescription Narcotics: · Limit your activities · Do not drive and operate hazardous machinery · Do not make important personal or business decisions · Do  not drink alcoholic beverages · If you have not urinated within 8 hours after discharge, please contact your surgeon on call. Report the following to your surgeon: 
· Excessive pain, swelling, redness or odor of or around the surgical area · Temperature over 100.5 · Nausea and vomiting lasting longer than 4 hours or if unable to take medications · Any signs of decreased circulation or nerve impairment to extremity: change in color, persistent  numbness, tingling, coldness or increase pain · Any questions [address] Breathing deep and coughing are very important exercises to do after surgery. Deep breathing and coughing open the little air tubes and air sacks in your lungs. You take deep breaths every day. You may not even notice - it is just something you do when you sigh or yawn. It is a natural exercise you do to keep these air passages open. After surgery, take deep breaths and cough, on purpose. Coughing and deep breathing help prevent bronchitis and pneumonia after surgery. If you had chest or belly surgery, use a pillow as a \"hug buddy\" and hold it tightly to your chest or belly when you cough. DIRECTIONS: 
6. Take 10 to 15 slow deep breaths every hour while awake. 7. Breathe in deeply, and hold it for 2 seconds. 8. Exhale slowly through puckered lips, like blowing up a balloon. 9. After every 4th or 5th deep breath, hug your pillow to your chest or belly and give a hard, deep cough. Yes, it will probably hurt. But doing this exercise is very important part of healing after surgery. Take your pain medicine to help you do this exercise without too much pain. IF YOU HAVE BEEN DIAGNOSED WITH SLEEP APNEA, PLEASE USE YOUR SLEEP APNEA DEVICE OR CPAP MACHINE WHEN YOU INTEND TO NAP AFTER TAKING PAIN MEDICATION. Ankle Pumps Ankle pumps increase the circulation of oxygenated blood to your lower extremities and decrease your risk for circulation problems such as blood clots. They also stretch the muscles, tendons and ligaments in your foot and ankle, and prevent joint contracture in the ankle and foot, especially after surgeries on the legs. It is important to do ankle pump exercises regularly after surgery because immobility increases your risk for developing a blood clot. Your doctor may also have you take an Aspirin for the next few days as well. If your doctor did not ask you to take an Aspirin, consult with him before starting Aspirin therapy on your own. Slowly point your foot forward, feeling the muscles on the top of your lower leg stretch, and hold this position for 5 seconds. Next, pull your foot back toward you as far as possible, stretching the calf muscles, and hold that position for 5 seconds. Repeat with the other foot. Perform 10 repetitions every hour while awake for both ankles if possible (down and then up with the foot once is one repetition). You should feel gentle stretching of the muscles in your lower leg when doing this exercise. If you feel pain, or your range of motion is limited, don't  Push too hard. Only go the limit your joint and muscles will let you go. If you have increasing pain, progressively worsening leg warmth or swelling, STOP the exercise and call your doctor. Below is information about the medications your doctor is prescribing after your visit: 
 
 
· Oxycodone-acetaminophen (PERCOCET) · Ondansetron (ZOFRAN) · docusate (COLACE) · Vitamin C 
· Cephalexin (KEFLEX) Introducing [facility] SERVICES! Ronald Knapp introduces [employer] patient portal. Now you can access parts of your medical record, email your doctor's office, and request medication refills online. 1. In your internet browser, go to [email or web address] 2. Click on the First Time User? Click Here link in the Sign In box. You will see the New Member Sign Up page. 3. Enter your [employer] Access Code exactly as it appears below. You will not need to use this code after youve completed the sign-up process. If you do not sign up before the expiration date, you must request a new code. · [employer] Access Code: [email or web address] Expires: 4/17/2018  8:46 PM 
 
4. Enter the last four digits of your Social Security Number (xxxx) and Date of Birth (mm/dd/yyyy) as indicated and click Submit. You will be taken to the next sign-up page. 5. Create a [employer] ID.  This will be your [employer] login ID and cannot be changed, so think of one that is secure and easy to remember. 6. Create a [employer] password. You can change your password at any time. 7. Enter your Password Reset Question and Answer. This can be used at a later time if you forget your password. 8. Enter your e-mail address. You will receive e-mail notification when new information is available in [address] 9. Click Sign Up. You can now view and download portions of your medical record. 10. Click the Download Summary menu link to download a portable copy of your medical information. If you have questions, please visit the Frequently Asked Questions section of the [employer] website. Remember, [employer] is NOT to be used for urgent needs. For medical emergencies, dial 911. Now available from your iPhone and Android! Providers Seen During Your Hospitalization Provider Specialty Primary office phone Carolina Valdovinos MD Orthopedic Surgery [phone] Your Primary Care Physician (PCP) Primary Care Physician Office Phone Office Fax Bhargav Barron [phone] You are allergic to the following No active allergies Recent Documentation Height Weight BMI Smoking Status 1.803 m 100 kg 30.75 kg/m2 Former Smoker Emergency Contacts Name Discharge Info Relation Home Work Mobile Halle Foley DISCHARGE CAREGIVER [3] Child [2] [phone] MarioDaiana DISCHARGE CAREGIVER [3] Spouse [3] [phone] Patient Belongings The following personal items are in your possession at time of discharge: 
  Dental Appliances: Uppers, Lowers, At home  Visual Aid: Glasses      Home Medications: None   Jewelry: None  Clothing: Other (comment) (street clothes to locker)    Other Valuables: None Please provide this summary of care documentation to your next provider.  
  
  
 
  
Signatures-by signing, you are acknowledging that this After Visit Summary has been reviewed with you and you have received a copy. Patient Signature:  ____________________________________________________________ Date:  ____________________________________________________________  
  
Nanetta Polk Provider Signature:  ____________________________________________________________ Date:  ____________________________________________________________

## 2018-01-19 NOTE — OP NOTES
OPERATIVE NOTE     PREOPERATIVE DIAGNOSIS:  1. Comminuted intra-articular left distal radius fracture  2. Nondisplaced left long finger P2 base fracture  3. Left hand osteoarthritis     POSTOPERATIVE DIAGNOSIS:    1. Open comminuted intra-articular left distal radius fracture  2. Nondisplaced left long finger P2 base fracture  3. Left hand osteoarthritis     PROCEDURE:   1. Open reduction internal fixation of intra-articular distal radius fracture two primary fragments  2. Excisional debridement skin, subcutaneous tissue associated with open fracture  3. Closed treatment of left long finger P2 base fracture  4. Intraoperative use of fluoroscopy     IMPLANT: [employer] Geminus system     SURGEON: Luis Alberto Varela MD     ANESTHESIA:  Regional     BLOOD LOSS: Minimal.      COMPLICATIONS: None.      SPECIMENS: None. FINDINGS: Grade I open distal radius fracture with punctate wound over the distal ulna     INDICATIONS: Cristiano Stahl is a 76 y.o. male who presented with a left distal radius fracture that was unstable based on criteria addressed in history and physical. After discussion of risks and benefits, including risks of bleeding, infection, damage to surrounding structures, failure to heal, further fracture, persistent pain, stiffness, and loss of function, risks of anesthesia, and other risks, the patient  elected to proceed with the above procedure and signed operative consent.     DESCRIPTION OF PROCEDURE:  The patient was seen and identified in the preanesthesia care unit. The operative site was marked. Preoperative questions were invited and answered. Risks and benefits of the procedure were again reviewed. The patient was then evaluated by the anesthesia team and brought to the operative suite on a stretcher and transferred to the operating room table in the supine position. Light sedation was induced. A well padded tourniquet was then placed high on the patient's operative extremity.  The patient was then prepped and draped in the usual sterile fashion. Preoperative Ancef was given. A timeout was completed confirming the appropriate site, side, and procedure. DVT prophylaxis was not needed intraoperatively secondary to the duration of the case. Upon examining the arm, there was a punctate wound over the distal ulna deep to dermis corresponding to an open fracture. This wound was copiously irrigated using normal saline. Unhealthy subcutaneous tissue was excised with a forceps. The operative extremity was then exsanguinated using an Esmarch bandage, and the tourniquet was inflated to 250 mmHg. A standard volar approach to the wrist was performed. A longitudinal incision was made over the FCR tendon, angled distally toward the radial styloid. The FCR sheath was identified, and this was divided longitudinally with care taken to place incision over the radial aspect of the tendon sheath. The FCR tendon was retracted ulnarly, and the floor of the sheath was similarly divided. Flexor tendons and median nerve were retracted ulnarly and protected. Pronator quadratus muscle was released from distal radius in standard L fashion. Fracture lines were visualized. Fracture was reduced and provisionally stabilized . Satisfactory reduction was then confirmed using fluoroscopy. An appropriately sized volar locking plate was applied to the volar surface of the distal radius and after appropriate position was confirmed after placement of the distal K wires through the plate/K-wire guide, this was secured to the bone using a cortical screw. After appropriate position and trajectory of the distal locking fixation was confirmed using the distal jig and K-wires, the distal fixation was inserted. Care was taken to ensure that the distal fixation did not penetrate the dorsal cortex. The remaining diaphyseal fixation was then inserted.   Satisfactory reduction and position of the hardware was then confirmed using fluoroscopy, and multiple views were taken including 20 degree lateral view confirming absence of screws in the radiocarpal and distal radioulnar joints. The distal radial ulnar joint was then assessed clinically and noted to be stable. Tourniquet deflated, and hemostasis was achieved using bipolar cautery. The wound was irrigated. The pronator quadratus was not repairable. Incision closed with Vicryl in the subcutaneous layer and Monocryl in the skin. Sterile dressing was applied. Wrist immobilized in a well padded plaster splint, and this extended into the safe position addressing the long finger middle phalanx fracture. . The patient was transferred to the recovery room in stable condition after all counts were correct.     POSTOPERATIVE PLAN: The patient will return for wound check and transition to a removable splint. Anticipate beginning wrist ROM at one week post-op. In the meantime, the patient will starting aggressive finger range of motion exercises without resistance. Vitamin C for six weeks post-operatively.

## 2018-01-19 NOTE — PERIOP NOTES
Blood sugar of 79 results given to Dr Melvin Kemp (gabi.)  He does not want to treat it at this time.   Gabi is now doing nerve block and assuming care of the patient

## 2018-01-19 NOTE — ANESTHESIA PROCEDURE NOTES
Peripheral Block    Start time: 1/19/2018 3:35 PM  End time: 1/19/2018 3:45 PM  Performed by: Susan Marroquin  Authorized by: Kami Higginbotham       Pre-procedure:    Indications: at surgeon's request and primary anesthetic    Preanesthetic Checklist: patient identified, risks and benefits discussed, site marked, timeout performed, anesthesia consent given and patient being monitored    Timeout Time: 15:35          Block Type:   Block Type:  Infraclavicular  Laterality:  Left  Monitoring:  Continuous pulse ox, frequent vital sign checks, heart rate, responsive to questions and oxygen  Injection Technique:  Single shot  Procedures: ultrasound guided and nerve stimulator    Patient Position: supine  Prep: chlorhexidine    Location:  Axilla  Needle Type:  Stimuplex  Needle Gauge:  21 G  Needle Localization:  Anatomical landmarks and ultrasound guidance  Medication Injected:  0.5%  ropivacaine  Volume (mL):  30    Assessment:  Number of attempts:  1  Injection Assessment:  Incremental injection every 5 mL, local visualized surrounding nerve on ultrasound, negative aspiration for blood, no paresthesia and no intravascular symptoms  Patient tolerance:  Patient tolerated the procedure well with no immediate complications

## 2018-01-22 NOTE — ANESTHESIA POSTPROCEDURE EVALUATION
Post-Anesthesia Evaluation and Assessment    Patient: Jazmin Naranjo MRN: [id number]  SSN: [id number]    YOB: 1940  Age: 76 y.o. Sex: male       Cardiovascular Function/Vital Signs  Visit Vitals    /60    Pulse 68    Temp 36.4 °C (97.5 °F)    Resp 13    Ht 5' 11\" (1.803 m)    Wt 100 kg (220 lb 7.4 oz)    SpO2 100%    BMI 30.75 kg/m2       Patient is status post regional anesthesia for Procedure(s):  OPEN REDUCTION INTERNAL FIXATION LEFT DISTAL RADIUS, . Patient discharged by PACU nursing without anesthesiologist evaluation.            January 22, 2018

## 2018-05-30 LAB
CREATININE, EXTERNAL: 1.63
HBA1C MFR BLD HPLC: 10.7 %
LDL-C, EXTERNAL: 73
MICROALBUMIN UR TEST STR-MCNC: 8 MG/DL

## 2018-06-21 ENCOUNTER — HOSPITAL ENCOUNTER (INPATIENT)
Age: 78
LOS: 5 days | Discharge: NURSING, REHAB OR LONG-TERM CARE | DRG: 064 | End: 2018-06-26
Attending: EMERGENCY MEDICINE | Admitting: INTERNAL MEDICINE
Payer: MEDICARE

## 2018-06-21 ENCOUNTER — APPOINTMENT (OUTPATIENT)
Dept: CT IMAGING | Age: 78
DRG: 064 | End: 2018-06-21
Attending: EMERGENCY MEDICINE
Payer: MEDICARE

## 2018-06-21 ENCOUNTER — APPOINTMENT (OUTPATIENT)
Dept: ULTRASOUND IMAGING | Age: 78
DRG: 064 | End: 2018-06-21
Attending: EMERGENCY MEDICINE
Payer: MEDICARE

## 2018-06-21 DIAGNOSIS — E86.0 DEHYDRATION: ICD-10-CM

## 2018-06-21 DIAGNOSIS — N17.9 AKI (ACUTE KIDNEY INJURY) (HCC): ICD-10-CM

## 2018-06-21 DIAGNOSIS — I63.9 CEREBROVASCULAR ACCIDENT (CVA), UNSPECIFIED MECHANISM (HCC): Primary | ICD-10-CM

## 2018-06-21 PROBLEM — N18.9 ACUTE ON CHRONIC RENAL FAILURE (HCC): Status: ACTIVE | Noted: 2018-06-21

## 2018-06-21 PROBLEM — R11.2 NAUSEA AND VOMITING: Status: ACTIVE | Noted: 2018-06-21

## 2018-06-21 PROBLEM — R10.13 EPIGASTRIC ABDOMINAL PAIN: Status: ACTIVE | Noted: 2018-06-21

## 2018-06-21 PROBLEM — N40.0 BPH (BENIGN PROSTATIC HYPERPLASIA): Status: ACTIVE | Noted: 2018-06-21

## 2018-06-21 PROBLEM — N39.0 UTI (URINARY TRACT INFECTION): Status: ACTIVE | Noted: 2018-06-21

## 2018-06-21 PROBLEM — E78.5 HYPERLIPIDEMIA: Status: ACTIVE | Noted: 2018-06-21

## 2018-06-21 LAB
ALBUMIN SERPL-MCNC: 3.6 G/DL (ref 3.5–5)
ALBUMIN/GLOB SERPL: 0.9 {RATIO} (ref 1.1–2.2)
ALP SERPL-CCNC: 68 U/L (ref 45–117)
ALT SERPL-CCNC: 23 U/L (ref 12–78)
ANION GAP SERPL CALC-SCNC: 12 MMOL/L (ref 5–15)
APPEARANCE UR: ABNORMAL
AST SERPL-CCNC: 18 U/L (ref 15–37)
ATRIAL RATE: 74 BPM
BACTERIA URNS QL MICRO: ABNORMAL /HPF
BASOPHILS # BLD: 0 K/UL (ref 0–0.1)
BASOPHILS NFR BLD: 0 % (ref 0–1)
BILIRUB SERPL-MCNC: 0.6 MG/DL (ref 0.2–1)
BILIRUB UR QL: NEGATIVE
BUN SERPL-MCNC: 39 MG/DL (ref 6–20)
BUN/CREAT SERPL: 18 (ref 12–20)
CALCIUM SERPL-MCNC: 8.9 MG/DL (ref 8.5–10.1)
CALCULATED P AXIS, ECG09: 69 DEGREES
CALCULATED R AXIS, ECG10: 23 DEGREES
CALCULATED T AXIS, ECG11: 68 DEGREES
CHLORIDE SERPL-SCNC: 97 MMOL/L (ref 97–108)
CO2 SERPL-SCNC: 28 MMOL/L (ref 21–32)
COLOR UR: ABNORMAL
CREAT SERPL-MCNC: 2.2 MG/DL (ref 0.7–1.3)
DIAGNOSIS, 93000: NORMAL
DIFFERENTIAL METHOD BLD: ABNORMAL
EOSINOPHIL # BLD: 0 K/UL (ref 0–0.4)
EOSINOPHIL NFR BLD: 0 % (ref 0–7)
EPITH CASTS URNS QL MICRO: ABNORMAL /LPF
ERYTHROCYTE [DISTWIDTH] IN BLOOD BY AUTOMATED COUNT: 13.6 % (ref 11.5–14.5)
GLOBULIN SER CALC-MCNC: 3.8 G/DL (ref 2–4)
GLUCOSE BLD STRIP.AUTO-MCNC: 269 MG/DL (ref 65–100)
GLUCOSE SERPL-MCNC: 369 MG/DL (ref 65–100)
GLUCOSE UR STRIP.AUTO-MCNC: >1000 MG/DL
HCT VFR BLD AUTO: 40.8 % (ref 36.6–50.3)
HGB BLD-MCNC: 13.8 G/DL (ref 12.1–17)
HGB UR QL STRIP: NEGATIVE
IMM GRANULOCYTES # BLD: 0.1 K/UL (ref 0–0.04)
IMM GRANULOCYTES NFR BLD AUTO: 1 % (ref 0–0.5)
KETONES UR QL STRIP.AUTO: ABNORMAL MG/DL
LEUKOCYTE ESTERASE UR QL STRIP.AUTO: NEGATIVE
LYMPHOCYTES # BLD: 1.4 K/UL (ref 0.8–3.5)
LYMPHOCYTES NFR BLD: 9 % (ref 12–49)
MAGNESIUM SERPL-MCNC: 1.8 MG/DL (ref 1.6–2.4)
MCH RBC QN AUTO: 28.6 PG (ref 26–34)
MCHC RBC AUTO-ENTMCNC: 33.8 G/DL (ref 30–36.5)
MCV RBC AUTO: 84.5 FL (ref 80–99)
MONOCYTES # BLD: 0.7 K/UL (ref 0–1)
MONOCYTES NFR BLD: 5 % (ref 5–13)
NEUTS SEG # BLD: 13.8 K/UL (ref 1.8–8)
NEUTS SEG NFR BLD: 86 % (ref 32–75)
NITRITE UR QL STRIP.AUTO: NEGATIVE
NRBC # BLD: 0 K/UL (ref 0–0.01)
NRBC BLD-RTO: 0 PER 100 WBC
P-R INTERVAL, ECG05: 162 MS
PH UR STRIP: 7.5 [PH] (ref 5–8)
PLATELET # BLD AUTO: 360 K/UL (ref 150–400)
PMV BLD AUTO: 10.4 FL (ref 8.9–12.9)
POTASSIUM SERPL-SCNC: 3.6 MMOL/L (ref 3.5–5.1)
PROT SERPL-MCNC: 7.4 G/DL (ref 6.4–8.2)
PROT UR STRIP-MCNC: 30 MG/DL
Q-T INTERVAL, ECG07: 450 MS
QRS DURATION, ECG06: 92 MS
QTC CALCULATION (BEZET), ECG08: 499 MS
RBC # BLD AUTO: 4.83 M/UL (ref 4.1–5.7)
RBC #/AREA URNS HPF: ABNORMAL /HPF (ref 0–5)
SERVICE CMNT-IMP: ABNORMAL
SODIUM SERPL-SCNC: 137 MMOL/L (ref 136–145)
SP GR UR REFRACTOMETRY: 1.03 (ref 1–1.03)
TROPONIN I SERPL-MCNC: <0.05 NG/ML
UR CULT HOLD, URHOLD: NORMAL
UROBILINOGEN UR QL STRIP.AUTO: 1 EU/DL (ref 0.2–1)
VENTRICULAR RATE, ECG03: 74 BPM
WBC # BLD AUTO: 16 K/UL (ref 4.1–11.1)
WBC URNS QL MICRO: ABNORMAL /HPF (ref 0–4)

## 2018-06-21 PROCEDURE — 36415 COLL VENOUS BLD VENIPUNCTURE: CPT | Performed by: EMERGENCY MEDICINE

## 2018-06-21 PROCEDURE — 74011250637 HC RX REV CODE- 250/637: Performed by: INTERNAL MEDICINE

## 2018-06-21 PROCEDURE — 81001 URINALYSIS AUTO W/SCOPE: CPT | Performed by: INTERNAL MEDICINE

## 2018-06-21 PROCEDURE — 84484 ASSAY OF TROPONIN QUANT: CPT | Performed by: EMERGENCY MEDICINE

## 2018-06-21 PROCEDURE — C9113 INJ PANTOPRAZOLE SODIUM, VIA: HCPCS | Performed by: INTERNAL MEDICINE

## 2018-06-21 PROCEDURE — 81001 URINALYSIS AUTO W/SCOPE: CPT | Performed by: EMERGENCY MEDICINE

## 2018-06-21 PROCEDURE — 65660000000 HC RM CCU STEPDOWN

## 2018-06-21 PROCEDURE — 81003 URINALYSIS AUTO W/O SCOPE: CPT | Performed by: INTERNAL MEDICINE

## 2018-06-21 PROCEDURE — 76705 ECHO EXAM OF ABDOMEN: CPT

## 2018-06-21 PROCEDURE — 74011250636 HC RX REV CODE- 250/636: Performed by: INTERNAL MEDICINE

## 2018-06-21 PROCEDURE — 96360 HYDRATION IV INFUSION INIT: CPT

## 2018-06-21 PROCEDURE — 70450 CT HEAD/BRAIN W/O DYE: CPT

## 2018-06-21 PROCEDURE — 74011250637 HC RX REV CODE- 250/637: Performed by: EMERGENCY MEDICINE

## 2018-06-21 PROCEDURE — 74011250636 HC RX REV CODE- 250/636: Performed by: EMERGENCY MEDICINE

## 2018-06-21 PROCEDURE — 74011636637 HC RX REV CODE- 636/637: Performed by: INTERNAL MEDICINE

## 2018-06-21 PROCEDURE — 93880 EXTRACRANIAL BILAT STUDY: CPT

## 2018-06-21 PROCEDURE — 80053 COMPREHEN METABOLIC PANEL: CPT | Performed by: EMERGENCY MEDICINE

## 2018-06-21 PROCEDURE — 85025 COMPLETE CBC W/AUTO DIFF WBC: CPT | Performed by: EMERGENCY MEDICINE

## 2018-06-21 PROCEDURE — 93005 ELECTROCARDIOGRAM TRACING: CPT

## 2018-06-21 PROCEDURE — 99285 EMERGENCY DEPT VISIT HI MDM: CPT

## 2018-06-21 PROCEDURE — 82962 GLUCOSE BLOOD TEST: CPT

## 2018-06-21 PROCEDURE — 83735 ASSAY OF MAGNESIUM: CPT | Performed by: EMERGENCY MEDICINE

## 2018-06-21 RX ORDER — INSULIN LISPRO 100 [IU]/ML
INJECTION, SOLUTION INTRAVENOUS; SUBCUTANEOUS EVERY 6 HOURS
Status: DISCONTINUED | OUTPATIENT
Start: 2018-06-21 | End: 2018-06-24

## 2018-06-21 RX ORDER — ESCITALOPRAM OXALATE 10 MG/1
10 TABLET ORAL DAILY
COMMUNITY

## 2018-06-21 RX ORDER — INSULIN ASPART 100 [IU]/ML
10 INJECTION, SOLUTION INTRAVENOUS; SUBCUTANEOUS
COMMUNITY
End: 2019-07-25 | Stop reason: SDUPTHER

## 2018-06-21 RX ORDER — ONDANSETRON 2 MG/ML
4 INJECTION INTRAMUSCULAR; INTRAVENOUS
Status: COMPLETED | OUTPATIENT
Start: 2018-06-21 | End: 2018-06-21

## 2018-06-21 RX ORDER — ACETAMINOPHEN 325 MG/1
650 TABLET ORAL
Status: DISCONTINUED | OUTPATIENT
Start: 2018-06-21 | End: 2018-06-26 | Stop reason: HOSPADM

## 2018-06-21 RX ORDER — UREA 10 %
100 LOTION (ML) TOPICAL
COMMUNITY

## 2018-06-21 RX ORDER — SODIUM CHLORIDE 9 MG/ML
125 INJECTION, SOLUTION INTRAVENOUS CONTINUOUS
Status: DISCONTINUED | OUTPATIENT
Start: 2018-06-21 | End: 2018-06-21

## 2018-06-21 RX ORDER — TERAZOSIN 1 MG/1
2 CAPSULE ORAL
Status: DISCONTINUED | OUTPATIENT
Start: 2018-06-21 | End: 2018-06-26 | Stop reason: HOSPADM

## 2018-06-21 RX ORDER — ATENOLOL 50 MG/1
50 TABLET ORAL DAILY
Status: DISCONTINUED | OUTPATIENT
Start: 2018-06-22 | End: 2018-06-26 | Stop reason: HOSPADM

## 2018-06-21 RX ORDER — FENOFIBRATE 145 MG/1
145 TABLET, COATED ORAL DAILY
Status: DISCONTINUED | OUTPATIENT
Start: 2018-06-21 | End: 2018-06-26 | Stop reason: HOSPADM

## 2018-06-21 RX ORDER — ASPIRIN 325 MG
325 TABLET ORAL DAILY
Status: DISCONTINUED | OUTPATIENT
Start: 2018-06-22 | End: 2018-06-22

## 2018-06-21 RX ORDER — HEPARIN SODIUM 5000 [USP'U]/ML
5000 INJECTION, SOLUTION INTRAVENOUS; SUBCUTANEOUS EVERY 8 HOURS
Status: DISCONTINUED | OUTPATIENT
Start: 2018-06-21 | End: 2018-06-26 | Stop reason: HOSPADM

## 2018-06-21 RX ORDER — SODIUM CHLORIDE 9 MG/ML
75 INJECTION, SOLUTION INTRAVENOUS CONTINUOUS
Status: DISPENSED | OUTPATIENT
Start: 2018-06-21 | End: 2018-06-22

## 2018-06-21 RX ORDER — LEVOTHYROXINE SODIUM 100 UG/1
100 TABLET ORAL
COMMUNITY

## 2018-06-21 RX ORDER — DEXTROSE 50 % IN WATER (D50W) INTRAVENOUS SYRINGE
12.5-25 AS NEEDED
Status: DISCONTINUED | OUTPATIENT
Start: 2018-06-21 | End: 2018-06-26 | Stop reason: HOSPADM

## 2018-06-21 RX ORDER — ONDANSETRON 2 MG/ML
4 INJECTION INTRAMUSCULAR; INTRAVENOUS
Status: DISCONTINUED | OUTPATIENT
Start: 2018-06-21 | End: 2018-06-26 | Stop reason: HOSPADM

## 2018-06-21 RX ORDER — SODIUM CHLORIDE 0.9 % (FLUSH) 0.9 %
5-10 SYRINGE (ML) INJECTION EVERY 8 HOURS
Status: DISCONTINUED | OUTPATIENT
Start: 2018-06-21 | End: 2018-06-26 | Stop reason: HOSPADM

## 2018-06-21 RX ORDER — PANTOPRAZOLE SODIUM 40 MG/10ML
40 INJECTION, POWDER, LYOPHILIZED, FOR SOLUTION INTRAVENOUS DAILY
Status: DISCONTINUED | OUTPATIENT
Start: 2018-06-21 | End: 2018-06-22

## 2018-06-21 RX ORDER — MAGNESIUM SULFATE 100 %
4 CRYSTALS MISCELLANEOUS AS NEEDED
Status: DISCONTINUED | OUTPATIENT
Start: 2018-06-21 | End: 2018-06-26 | Stop reason: HOSPADM

## 2018-06-21 RX ORDER — ATENOLOL 50 MG/1
50 TABLET ORAL DAILY
Status: DISCONTINUED | OUTPATIENT
Start: 2018-06-21 | End: 2018-06-21

## 2018-06-21 RX ORDER — PROCHLORPERAZINE EDISYLATE 5 MG/ML
10 INJECTION INTRAMUSCULAR; INTRAVENOUS
Status: DISCONTINUED | OUTPATIENT
Start: 2018-06-21 | End: 2018-06-26 | Stop reason: HOSPADM

## 2018-06-21 RX ORDER — ACETAMINOPHEN 650 MG/1
650 SUPPOSITORY RECTAL
Status: DISCONTINUED | OUTPATIENT
Start: 2018-06-21 | End: 2018-06-26 | Stop reason: HOSPADM

## 2018-06-21 RX ORDER — ESCITALOPRAM OXALATE 10 MG/1
10 TABLET ORAL DAILY
Status: DISCONTINUED | OUTPATIENT
Start: 2018-06-21 | End: 2018-06-26 | Stop reason: HOSPADM

## 2018-06-21 RX ORDER — LEVOTHYROXINE SODIUM 75 UG/1
75 TABLET ORAL
Status: DISCONTINUED | OUTPATIENT
Start: 2018-06-21 | End: 2018-06-26 | Stop reason: HOSPADM

## 2018-06-21 RX ORDER — SODIUM CHLORIDE 0.9 % (FLUSH) 0.9 %
5-10 SYRINGE (ML) INJECTION AS NEEDED
Status: DISCONTINUED | OUTPATIENT
Start: 2018-06-21 | End: 2018-06-26 | Stop reason: HOSPADM

## 2018-06-21 RX ORDER — ENOXAPARIN SODIUM 100 MG/ML
40 INJECTION SUBCUTANEOUS EVERY 24 HOURS
Status: CANCELLED | OUTPATIENT
Start: 2018-06-21

## 2018-06-21 RX ORDER — ASPIRIN 325 MG
325 TABLET ORAL ONCE
Status: COMPLETED | OUTPATIENT
Start: 2018-06-21 | End: 2018-06-21

## 2018-06-21 RX ADMIN — TERAZOSIN HYDROCHLORIDE ANHYDROUS 2 MG: 1 CAPSULE ORAL at 21:23

## 2018-06-21 RX ADMIN — LEVOTHYROXINE SODIUM 75 MCG: 75 TABLET ORAL at 21:43

## 2018-06-21 RX ADMIN — SODIUM CHLORIDE 125 ML/HR: 900 INJECTION, SOLUTION INTRAVENOUS at 18:56

## 2018-06-21 RX ADMIN — ESCITALOPRAM OXALATE 10 MG: 10 TABLET ORAL at 21:23

## 2018-06-21 RX ADMIN — Medication 10 ML: at 23:07

## 2018-06-21 RX ADMIN — PANTOPRAZOLE SODIUM 40 MG: 40 INJECTION, POWDER, FOR SOLUTION INTRAVENOUS at 18:56

## 2018-06-21 RX ADMIN — FENOFIBRATE 145 MG: 145 TABLET ORAL at 21:22

## 2018-06-21 RX ADMIN — INSULIN LISPRO 5 UNITS: 100 INJECTION, SOLUTION INTRAVENOUS; SUBCUTANEOUS at 20:55

## 2018-06-21 RX ADMIN — ASPIRIN 325 MG: 325 TABLET ORAL at 15:53

## 2018-06-21 RX ADMIN — SODIUM CHLORIDE 75 ML/HR: 900 INJECTION, SOLUTION INTRAVENOUS at 23:07

## 2018-06-21 RX ADMIN — HEPARIN SODIUM 5000 UNITS: 5000 INJECTION, SOLUTION INTRAVENOUS; SUBCUTANEOUS at 16:45

## 2018-06-21 RX ADMIN — SODIUM CHLORIDE 1000 ML: 900 INJECTION, SOLUTION INTRAVENOUS at 14:20

## 2018-06-21 RX ADMIN — HEPARIN SODIUM 5000 UNITS: 5000 INJECTION, SOLUTION INTRAVENOUS; SUBCUTANEOUS at 23:06

## 2018-06-21 RX ADMIN — ONDANSETRON 4 MG: 2 INJECTION INTRAMUSCULAR; INTRAVENOUS at 15:59

## 2018-06-21 NOTE — PROGRESS NOTES
6/21/2018  4:48 PM    EMR reviewed, pt to [facility] ED c/o Nausea/Vomiting, Unsteady Gait/Vertigo. CM met w/ pt to begin d/c planning, charted demographics verified, lives w/ wife in 1 story home w/ 3 entry steps and B/L handrails. Pt's wife has dementia and he is caregiver for her, family assists. PTA independent w/ ADL's, pt does not drive recently due to vertigo, relies on family to transport. PCP is Zee Nix MD; pt has Rx coverage fills at [address] Pt has not had HH; Ambulatory w/o assistive device, no DME. Reason for Admission:    Nausea/Vomiting, Unsteady Gait/Vertigo               RRAT Score:     28             Resources/supports as identified by patient/family:   Pt has supportive DTR Khalida Teran who assists w/ pt and pt's wife                Top Challenges facing patient (as identified by patient/family and CM): Finances/Medication cost?      No concerns              Transportation? DTR is able to provide transport              Support system or lack thereof? Pt has good support system DTR and grandchildren                     Living arrangements? Lives w/ wife, who has dementia, in 1 story home             Self-care/ADLs/Cognition? Pt reports being independent w/ his ADL's ambulatory w/o assistive device. Pt has good cognition of care          Current Advanced Directive/Advance Care Plan:  Not on File                          Plan for utilizing home health:    None (wife is serviced by [employer])                      Likelihood of readmission:  Moderate/Yellow                 Transition of Care Plan:         Hospital admission for medical management,  Disposition pending PT/OT jeovanny,   f/u w/ PCP and specialists at d/c. Floor CM to follow and assist w/ d/c needs.       Linda Haywood

## 2018-06-21 NOTE — ED TRIAGE NOTES
\"My primary care physician says she thinks I'm dehydrated. I can't take one sip of water without vomiting up 8 ounces. One bite of food and I get sick. \" Symptoms present for 3 days. He denies abdominal pain or fever. Patient reports that he is also having an episode of vertigo, he was sent to a specialist by his PCP about 3 weeks ago and they diagnosed vertigo.

## 2018-06-21 NOTE — ED PROVIDER NOTES
HPI Comments: 76 y.o. male with past medical history significant for DM, HTN, HLD, hypothyroidism, and L shoulder repair who presents from home with chief complaint of vomiting. The pt has nausea, vomiting, and frequency over the last 3 days. He has also had mild diaphoresis. The pt has not been able to tolerate any PO intake including small amounts of water. The pt has not been eating as much over the last two days and is why he hasn't taken his insulin 2 days. The daughter reports that he in unable to keep down his DM pills. The pt last checked his blood sugar one day ago and it was 331 which is high for the pt. The pt's last BM was one day ago and it was non-bloody and \"semi soft\". The pt has had vertigo for the last 3 weeks and is on medication for it. The pt has not had his head imaged yet because he is waiting to see neurology. The pt has chronic lower back pain and occasional tinnitus at baseline. The pt denies abdominal pain, diarrhea, CP, dysuria, recent sick contact, and prior abdominal surgery. There are no other acute medical concerns at this time. Nothing affects sx. Standing up makes unsteadiness worse. Social hx: former smoker, no EtOH use  PCP: Flory Han MD    Note written by Deven Thompson, as dictated by Shruthi Lockhart DO 2:01 PM      The history is provided by the patient. No  was used.         Past Medical History:   Diagnosis Date    BPH (benign prostatic hyperplasia)     Diabetes ([address]     HTN (hypertension)     Hyperlipidemia     Thyroid disease     Hypo thyroid    Type II or unspecified type diabetes mellitus without mention of complication, uncontrolled        Past Surgical History:   Procedure Laterality Date    HX ORTHOPAEDIC  L shoulder repair    HX ORTHOPAEDIC      Left shoulder         Family History:   Problem Relation Age of Onset    Diabetes Mother     Cancer Mother     Diabetes Father     Diabetes Sister        Social History     Social History    Marital status:      Spouse name: N/A    Number of children: N/A    Years of education: N/A     Occupational History    Not on file. Social History Main Topics    Smoking status: Former Smoker    Smokeless tobacco: Never Used      Comment: Smoked 50 years ago    Alcohol use No    Drug use: No    Sexual activity: Not on file     Other Topics Concern    Not on file     Social History Narrative    ** Merged History Encounter **              ALLERGIES: Review of patient's allergies indicates no known allergies. Review of Systems   HENT: Positive for tinnitus (chronic; occasional). Gastrointestinal: Positive for nausea and vomiting. Negative for abdominal pain, blood in stool and diarrhea. Genitourinary: Positive for frequency. Negative for dysuria. Musculoskeletal: Positive for back pain (chronic). All other systems reviewed and are negative. Vitals:    06/21/18 1338   BP: 108/61   Pulse: 79   Resp: 18   Temp: 97.6 °F (36.4 °C)   SpO2: 96%   Weight: 99.3 kg (219 lb)   Height: 5' 11\" (1.803 m)            Physical Exam   Nursing note and vitals reviewed. Constitutional: Pt is awake and alert. NAD. Frail. Elderly. HENT:   Head: Normocephalic and atraumatic. Nose: Nose normal.   Mouth/Throat: Oropharynx is clear. No oropharyngeal exudate. Dry lips  Eyes: Conjunctivae and extraocular motions are normal. Pupils are equal, round, and reactive to light. Right eye exhibits no discharge. Left eye exhibits no discharge. No scleral icterus. Ears: B/L TMs are normal  Neck: No tracheal deviation present. Supple neck. Cardiovascular: Normal rate, regular rhythm, normal heart sounds and intact distal pulses. Exam reveals no gallop and no friction rub. No murmur heard. Pulmonary/Chest: Effort normal and breath sounds normal.  Pt  has no wheezes. Pt  has no rales. Abdominal: Soft. Pt  exhibits no distension and no mass. No tenderness.   Pt  has no rebound and no guarding. Musculoskeletal:  Pt  exhibits no edema. Mild epigastric tenderness. Ext: Normal ROM in all four extremities; not tender to palpation; distal pulses are normal, no edema. Neurological:  Pt is alert. Skin: Skin is warm and dry. Pt  is not diaphoretic. Psychiatric:  Pt  has a normal mood and affect. Behavior is normal.     Note written by Deven Mckeon, as dictated by Yahir Bateman DO 2:05 PM            [facility]      ED Course       Procedures    ED EKG interpretation:  Rhythm: normal sinus rhythm; and regular . Rate (approx.): 74; Axis: normal; ST/T wave: no acute changes; prolonged QT  Note written by Deven Mckeon, as dictated by Yahir Bateman DO 2:15 PM    3:07 PM  Reviewed CT images    3:15 PM  The pt has no hx of stroke or head injury. D/w Dr Juju Smith - will admit    D/w Dr Itzel Bermudez - pt hs acute ischemia R occipital lobe.

## 2018-06-21 NOTE — IP AVS SNAPSHOT
[address] [facility] 
 
 
 [address] [facility] 
[phone] Patient: Mikal Maldonado MRN: [id number] BE About your hospitalization You were admitted on:  2018 You last received care in the:  [facility]  MED SURG 2 You were discharged on:  2018 Why you were hospitalized Your primary diagnosis was:  Not on File Your diagnoses also included:  Cva (Cerebral Vascular Accident) (Hcc), Essential Hypertension, Hypothyroidism Due To Acquired Atrophy Of Thyroid, Type 2 Diabetes Mellitus With Diabetic Nephropathy (Hcc), Hyperlipidemia, Obesity, Bph (Benign Prostatic Hyperplasia), Nausea And Vomiting, Epigastric Abdominal Pain, Acute On Chronic Renal Failure (Hcc), Uti (Urinary Tract Infection) Follow-up Information Follow up With Details Comments Contact Info Renae Leger MD Schedule an appointment as soon as possible for a visit in 1 week  [address] [place] [address] 
[phone] Kati Bazan MD   [address] [facility] [address] [phone] Your Scheduled Appointments   2:00 PM EDT ROUTINE CARE with Dede Rubio MD  
[facility] Emergency Department Diabetes & Endocrinology [address] [id number] [phone] Discharge Orders None A check wale indicates which time of day the medication should be taken. My Medications START taking these medications Instructions Each Dose to Equal  
 Morning Noon Evening Bedtime  
 pantoprazole 40 mg tablet Commonly known as:  PROTONIX Your last dose was: Your next dose is: Take 1 Tab by mouth Before breakfast and dinner. 40 mg  
    
   
   
   
  
 sucralfate 100 mg/mL suspension Commonly known as:  Sheridan Carrel Your last dose was: Your next dose is: Take 10 mL by mouth two (2) times a day for 14 days. 1 g CHANGE how you take these medications Instructions Each Dose to Equal  
 Morning Noon Evening Bedtime  
 insulin detemir U-100 100 unit/mL injection Commonly known as:  LEVEMIR U-100 INSULIN What changed:  how much to take Your last dose was: Your next dose is:    
   
   
 30 Units by SubCUTAneous route daily. 30 Units CONTINUE taking these medications Instructions Each Dose to Equal  
 Morning Noon Evening Bedtime  
 aspirin delayed-release 81 mg tablet Your last dose was: Your next dose is: Take 81 mg by mouth daily. 81 mg  
    
   
   
   
  
 atenolol 50 mg tablet Commonly known as:  TENORMIN Your last dose was: Your next dose is: Take 50 mg by mouth daily. 50 mg  
    
   
   
   
  
 escitalopram oxalate 10 mg tablet Commonly known as:  Colen Brunner Your last dose was: Your next dose is: Take 10 mg by mouth daily. 10 mg  
    
   
   
   
  
 fenofibrate micronized 134 mg capsule Commonly known as:  LOFIBRA Your last dose was: Your next dose is: Take 134 mg by mouth daily. 134 mg FISH OIL 1,000 mg Cap Generic drug:  omega-3 fatty acids-vitamin e Your last dose was: Your next dose is: Take 3 Caps by mouth daily. 3 Cap  
    
   
   
   
  
 glimepiride 4 mg tablet Commonly known as:  AMARYL Your last dose was: Your next dose is: TAKE 1 TABLET EVERY MORNING  
     
   
   
   
  
 levothyroxine 75 mcg tablet Commonly known as:  SYNTHROID Your last dose was: Your next dose is: Take 75 mcg by mouth Daily (before breakfast). 75 mcg  
    
   
   
   
  
 lisinopril 40 mg tablet Commonly known as:  Danial Tran  
   
 Your last dose was: Your next dose is: Take 40 mg by mouth daily. 40 mg NovoLOG Flexpen U-100 Insulin 100 unit/mL Inpn Generic drug:  insulin aspart U-100 Your last dose was: Your next dose is:    
   
   
 8 Units by SubCUTAneous route Before breakfast, lunch, and dinner. 8 Units  
    
   
   
   
  
 terazosin 2 mg capsule Commonly known as:  HYTRIN Your last dose was: Your next dose is: Take 2 mg by mouth nightly. 2 mg VITAMIN B-12 100 mcg tablet Generic drug:  cyanocobalamin Your last dose was: Your next dose is: Take 100 mcg by mouth daily. 100 mcg Where to Get Your Medications Information on where to get these meds will be given to you by the nurse or doctor. ! Ask your nurse or doctor about these medications  
  insulin detemir U-100 100 unit/mL injection  
 pantoprazole 40 mg tablet  
 sucralfate 100 mg/mL suspension Discharge Instructions Patient Discharge Instructions Mikal Maldonado / [id number] : 1940 Admitted 2018 Discharged: 2018 Primary Diagnoses Problem List as of 2018  Date Reviewed: 2018 Codes Class Noted - Resolved CVA (cerebral vascular accident) [facility]) ICD-10-CM: I63.9 ICD-9-CM: 434.91  2018 - Present Hyperlipidemia ICD-10-CM: E78.5 ICD-9-CM: 272.4  2018 - Present BPH (benign prostatic hyperplasia) ICD-10-CM: N40.0 ICD-9-CM: 600.00  2018 - Present Nausea and vomiting ICD-10-CM: R11.2 ICD-9-CM: 787.01  2018 - Present Epigastric abdominal pain ICD-10-CM: R10.13 ICD-9-CM: 789.06  2018 - Present Acute on chronic renal failure (HCC) ICD-10-CM: N17.9, N18.9 ICD-9-CM: 584.9, 585.9  2018 - Present UTI (urinary tract infection) ICD-10-CM: N39.0 ICD-9-CM: 599.0  6/21/2018 - Present Encounter for long-term (current) use of insulin ([facility].) ICD-10-CM: Z79.4 ICD-9-CM: V58.67  7/2/2016 - Present Hypothyroidism due to acquired atrophy of thyroid ICD-10-CM: E03.4 ICD-9-CM: 244.8, 246.8  11/18/2015 - Present Essential hypertension ICD-10-CM: I10 
ICD-9-CM: 401.9  11/18/2015 - Present Type 2 diabetes mellitus with diabetic nephropathy ([facility].) ICD-10-CM: E11.21 
ICD-9-CM: 250.40, 583.81  11/18/2015 - Present Obesity ICD-10-CM: E66.9 ICD-9-CM: 278.00  2/16/2015 - Present Type II or unspecified type diabetes mellitus without mention of complication, uncontrolled ICD-10-CM: E11.65 ICD-9-CM: 250.02  9/29/2014 - Present Acquired hypothyroidism ICD-10-CM: E03.9 ICD-9-CM: 244.9  9/29/2014 - Present RESOLVED: HTN (hypertension) ICD-10-CM: I10 
ICD-9-CM: 401.9  9/29/2014 - 11/18/2015 Take Home Medications · It is important that you take the medication exactly as they are prescribed. · Keep your medication in the bottles provided by the pharmacist and keep a list of the medication names, dosages, and times to be taken in your wallet. · Do not take other medications without consulting your doctor. What to do at [facility] Recommended diet: Resume previous diet Recommended activity: Activity as tolerated If you experience worsening symptoms, please follow up with nearest ER. Follow-up with your PCP in 2 weeks Information obtained by : 
I understand that if any problems occur once I am at home I am to contact my physician. I understand and acknowledge receipt of the instructions indicated above. Physician's or R.N.'s Signature                                                                  Date/Time Patient or Representative Signature                                                          Date/Time [employer] Announcement We are excited to announce that we are making your provider's discharge notes available to you in [employer] You will see these notes when they are completed and signed by the physician that discharged you from your recent hospital stay. If you have any questions or concerns about any information you see in [employer], please call the Health Information Department where you were seen or reach out to your Primary Care Provider for more information about your plan of care. Introducing [facility] SERVICES! [employer] introduces [employer] patient portal. Now you can access parts of your medical record, email your doctor's office, and request medication refills online. 1. In your internet browser, go to [email or web address] 2. Click on the First Time User? Click Here link in the Sign In box. You will see the New Member Sign Up page. 3. Enter your [employer] Access Code exactly as it appears below. You will not need to use this code after youve completed the sign-up process. If you do not sign up before the expiration date, you must request a new code. · [employer] Access Code: [email or web address] Expires: 8/9/2018  5:23 AM 
 
4. Enter the last four digits of your Social Security Number (xxxx) and Date of Birth (mm/dd/yyyy) as indicated and click Submit. You will be taken to the next sign-up page. 5. Create a [employer] ID. This will be your [employer] login ID and cannot be changed, so think of one that is secure and easy to remember. 6. Create a [employer] password. You can change your password at any time. 7. Enter your Password Reset Question and Answer. This can be used at a later time if you forget your password. 8. Enter your e-mail address. You will receive e-mail notification when new information is available in [address] 9. Click Sign Up. You can now view and download portions of your medical record. 10. Click the Download Summary menu link to download a portable copy of your medical information. If you have questions, please visit the Frequently Asked Questions section of the MyChart website. Remember, [employer] is NOT to be used for urgent needs. For medical emergencies, dial 911. Now available from your iPhone and Android! Introducing John Werner As a [employer] [facility] patient, I wanted to make you aware of our electronic visit tool called John Werner. [employer] [facility] 24/7 allows you to connect within minutes with a medical provider 24 hours a day, seven days a week via a mobile device or tablet or logging into a secure website from your computer. You can access John Werner from anywhere in the [place]. A virtual visit might be right for you when you have a simple condition and feel like you just dont want to get out of bed, or cant get away from work for an appointment, when your regular [address] provider is not available (evenings, weekends or holidays), or when youre out of town and need minor care. Electronic visits cost only $49 and if the [address] 24/7 provider determines a prescription is needed to treat your condition, one can be electronically transmitted to a nearby pharmacy*. Please take a moment to enroll today if you have not already done so. The enrollment process is free and takes just a few minutes. To enroll, please download the [employer] 24/7 yue to your tablet or phone, or visit [email or web address] to enroll on your computer.    
And, as an [address] [place] [address] patient with a [employer] account, the results of your visits will be scanned into your electronic medical record and your primary care provider will be able to view the scanned results. We urge you to continue to see your regular [facility] provider for your ongoing medical care. And while your primary care provider may not be the one available when you seek a John Martinezwilliefin virtual visit, the peace of mind you get from getting a real diagnosis real time can be priceless. For more information on John Werner, view our Frequently Asked Questions (FAQs) at [email or web address]. Sincerely, 
 
Ava Diaz MD 
Chief Medical Officer 1 Daiana Falcon *:  certain medications cannot be prescribed via John Martinezedward Unresulted tests-please follow up with your PCP on these results Procedure/Test Authorizing Provider  2D ECHO COMPLETE ADULT (TTE) W OR WO CONTR Ian Lewis MD  
 CBC W/O DIFF Pixie Pato V, DO  
 CBC WITH AUTOMATED DIFF Daniel Singh MD  
 CBC WITH AUTOMATED DIFF Hannah Aurora, DO  
 CBC WITH AUTOMATED DIFF Latha Inks Linward Finical, DO  
 CBC WITH AUTOMATED DIFF Latha Inks Linward Finical, DO  
 CT HEAD WO CONT Hannah Simon, DO  
 DUPLEX CAROTID BILATERAL Daniel Singh MD  
 EKG, 12 LEAD, INITIAL Hannah Simon, DO  
 HEMOGLOBIN A1C WITH EAG Daniel Singh MD  
 LIPID PANEL Daniel Singh MD  
 MAGNESIUM Hannah Simon, DO  
 MAGNESIUM Latha Inks Linward Finical, DO  
 MAGNESIUM Latha Inks Linward Finical, DO  
 METABOLIC PANEL, BASIC Daniel Singh MD  
 METABOLIC PANEL, BASIC Ryanne Finn, DO  
 METABOLIC PANEL, COMPREHENSIVE Hannah Simon, DO  
 MRA BRAIN WO CONT Latha Inks Linward Finical, DO  
 MRA NECK WO CONT Latha Inks Linward Finical, DO  
 MRI BRAIN WO CONT Latha Inks Linward Finical, DO  
 MRI CERV SPINE WO CONT Latha Inks Linward Finical, DO  
 PHOSPHORUS Latha Inks Linward Finical, DO  
 PHOSPHORUS Latha Inks Linward Finical, DO  
 SAMPLES BEING HELD Hannah Simon, DO  
 TROPONIN I Hannah Simon, DO  
 TSH 3RD [facility] Maite Garcia, DO  
 URINALYSIS W/ RFLX MICROSCOPIC Raúl Washington MD  
 URINALYSIS W/MICROSCOPIC Bebeto Galeas,   
 URINE CULTURE HOLD SAMPLE Bebeto Galeas, DO  
 US ABD LTD Bebeto Galeas, DO  
  
Providers Seen During Your Hospitalization Provider Specialty Primary office phone Bebeto Galeas DO Emergency Medicine [phone] [facility],  Internal Medicine [phone] Raúl Washington MD Hospitalist [phone] Your Primary Care Physician (PCP) Primary Care Physician Office Phone Office Fax Livia Cabot [phone] You are allergic to the following No active allergies Recent Documentation Height Weight BMI Smoking Status 1.803 m 93.5 kg 28.75 kg/m2 Former Smoker Emergency Contacts Name Discharge Info Relation Home Work Mobile Halle Foley DISCHARGE CAREGIVER [3] Child [2] [phone] Daiana Martin DISCHARGE CAREGIVER [3] Spouse [3] [phone] Patient Belongings The following personal items are in your possession at time of discharge: 
  Dental Appliances: Lowers, Uppers, At home  Visual Aid: Glasses, With patient      Home Medications: None   Jewelry: None  Clothing: At bedside, Footwear, [employer], Socks, Shirt, Pants    Other Valuables: Eyeglasses Discharge Instructions Attachments/References STROKE (ENGLISH) STROKE REHABILITATION: GENERAL INFO (ENGLISH) STROKE: SECONDARY PREVENTION: GENERAL INFO (ENGLISH) DYSPEPSIA (ENGLISH) EGD (UPPER ENDOSCOPY): POST-OP (ENGLISH) Patient Handouts Stroke: Care Instructions Your Care Instructions You have had a stroke. This means that the blood flow to a part of your brain was blocked for some time, which damages the nerve cells in that part of the brain. The part of your body controlled by that part of your brain may not function properly now. The brain is an amazing organ that can heal itself to some degree.  The stroke you had damaged part of your brain. But other parts of your brain may take over in some way for the damaged areas. You have already started this process. Your doctor will talk with you about what you can do to prevent another stroke. High blood pressure, high cholesterol, and diabetes are all risk factors for stroke. If you have any of these conditions, work with your doctor to make sure they are under control. Other risk factors for stroke include being overweight, smoking, and not getting regular exercise. Going home may be hard for you and your family. The more you can try to do for yourself, the better. Remember to take each day one at a time. Follow-up care is a key part of your treatment and safety. Be sure to make and go to all appointments, and call your doctor if you are having problems. It's also a good idea to know your test results and keep a list of the medicines you take. How can you care for yourself at home? ? · Enter a stroke rehabilitation (rehab) program, if your doctor recommends it. Physical, speech, and occupational therapies can help you manage bathing, dressing, eating, and other basics of daily living. ? · Do not drive until your doctor says it is okay. ? · It is normal to feel sad or depressed after a stroke. If these feelings last, talk to your doctor. ? · If you are having problems with urine leakage, go to the bathroom at regular times, including when you first wake up and at bedtime. Also, limit fluids after dinner. ? · If you are constipated, drink plenty of fluids, enough so that your urine is light yellow or clear like water. If you have kidney, heart, or liver disease and have to limit fluids, talk with your doctor before you increase the amount of fluids you drink. Set up a regular time for using the toilet. If you continue to have constipation, your doctor may suggest using a bulking agent, such as Metamucil, or a stool softener, laxative, or enema. Medicines ? · Take your medicines exactly as prescribed. Call your doctor if you think you are having a problem with your medicine. You may be taking several medicines. ACE (angiotensin-converting enzyme) inhibitors, angiotensin II receptor blockers (ARBs), beta-blockers, diuretics (water pills), and calcium channel blockers control your blood pressure. Statins help lower cholesterol. Your doctor may also prescribe medicines for depression, pain, sleep problems, anxiety, or agitation. ? · If your doctor has given you a blood thinner to prevent another stroke, be sure you get instructions about how to take your medicine safely. Blood thinners can cause serious bleeding problems. ? · Do not take any over-the-counter medicines or herbal products without talking to your doctor first.  
? · If you take birth control pills or hormone therapy, talk to your doctor about whether they are right for you. ? For family members and caregivers ? · Make the home safe. Set up a room so that your loved one does not have to climb stairs. Be sure the bathroom is on the same floor. Move throw rugs and furniture that could cause falls. Make sure that the lighting is good. Put grab bars and seats in tubs and showers. ? · Find out what your loved one can do and what he or she needs help with. Try not to do things for your loved one that your loved one can do on his or her own. Help him or her learn and practice new skills. ? · Visit and talk with your loved one often. Try doing activities together that you both enjoy, such as playing cards or board games. Keep in touch with your loved one's friends as much as you can. Encourage them to visit. ? · Take care of yourself. Do not try to do everything yourself. Ask other family members to help. Eat well, get enough rest, and take time to do things that you enjoy.  Keep up with your own doctor visits, and make sure to take your medicines regularly. Get out of the house as much as you can. Join a local support group. Find out if you qualify for home health care visits to help with rehab or for adult day care. When should you call for help? Call 911 anytime you think you may need emergency care. For example, call if: 
? · You have signs of another stroke. These may include: 
¨ Sudden numbness, tingling, weakness, or loss of movement in your face, arm, or leg, especially on only one side of your body. ¨ Sudden vision changes. ¨ Sudden trouble speaking. ¨ Sudden confusion or trouble understanding simple statements. ¨ Sudden problems with walking or balance. ¨ A sudden, severe headache that is different from past headaches. Call 911 even if these symptoms go away in a few minutes. ?Call your doctor now or seek immediate medical care if: 
? · You have new symptoms that may be related to your stroke, such as falls or trouble swallowing. ? Watch closely for changes in your health, and be sure to contact your doctor if you have any problems. Where can you learn more? Go to http://nitoAeternusLEDmaria de jesus.info/. Enter V574 in the search box to learn more about \"Stroke: Care Instructions. \" Current as of: March 20, 2017 Content Version: 11.4 © 1965-6818 [employer]wise[employer]. Care instructions adapted under license by "[employer] (which disclaims liability or warranty for this information). If you have questions about a medical condition or this instruction, always ask your healthcare professional. [place] [address] any warranty or liability for your use of this information. Learning About Stroke Rehabilitation What is stroke rehabilitation? Stroke rehabilitation (rehab) is training and therapy to help you relearn to do everyday things you have not been able to do since your stroke.  The focus will depend on how the stroke has affected your ability to do the things you want and need to do. Rehab begins in the hospital. It starts as soon as you are able. You will have a team of doctors, nurses, and therapists to help you relearn daily activities. This can include eating, bathing, and dressing. You also may need help to learn how to walk or talk again. If the stroke damaged your memory, you will learn ways to improve it. You will get better faster if you begin rehab soon after the stroke. But even with rehab, you may not be able to do all the things you could before the stroke. You may recover the most in the first few weeks or months after your stroke. But you can keep getting better for years. It just may happen more slowly. And it may take a long time and a lot of hard work. Don't give up hope. After the hospital, you may go to a rehab facility or a nursing home for a while. Or you may go home. Wherever you go, keep working on your rehab and do a little every day. It's going to be important for you to get the support you need. Let your loved ones help you. Involve them in your treatment. Talk to others who have had a stroke, and find out how they handled ups and downs. How can stroke rehab specialists help? Your stroke rehab team will include doctors and nurses who specialize in stroke rehab, as well as other professionals. Each team member will help you in specific ways. The team may include the following professionals. Rehab doctor A rehab doctor is a specialist in charge of your rehab program. He or she may also work on special problems, such as muscle cramps and spasms. Rehab nurses Rehab nurses can help you relearn basic activities of daily life. For example, they can help you learn how to: · Take care of your health, including a schedule for medicine. · Get from your bed to a wheelchair. · Bathe. · Control your bowels or bladder. Physical therapist 
A stroke often takes away a person's ability to move in certain ways.  A physical therapist helps you get back as much movement, balance, and coordination as possible. Physical therapy usually includes exercises. The exercises can help you get back your ability to walk and move as much as possible. It's important to practice these exercises over and over again. Your therapist may also help you learn to use a wheelchair or walker. And he or she may teach you how to use stairs safely. Occupational therapist 
An occupational therapist helps you practice daily tasks like eating, bathing, dressing, and writing. For example, he or she may help you learn how to: · Prepare meals and clean your house. · Drive your car. · Use tools and devices that can help if you no longer have full use of both hands. For example, velcro can replace buttons on clothing. · Get grab bars for your bathroom. · Make your home safe if you have strength, balance, or vision problems. Speech therapist 
A speech therapist can help you relearn how to talk or find new ways to express yourself. Swallowing is sometimes a problem after a stroke. This therapist can help you improve your ability to swallow. A speech therapist can also help you work on reading and writing skills. Psychologist or counselor Emotions like fear, anxiety, anger, sadness, frustration, and grief are common after a stroke. A psychologist or counselor can help you deal with your emotions. They can also help you get treatment if you have depression. Vocational counselor Stroke can leave you with disabilities that make it hard to do your job. A vocational counselor can help you return to your job or find a new one. He or she can help you: 
· Identify your current skills and prepare a new resume. · Search for a job. · Understand the laws that protect disabled workers. Recreational therapist 
A recreational therapist helps you return to doing things you enjoy. This may include the arts, hobbies, sports, or leisure activities. Follow-up care is a key part of your treatment and safety. Be sure to make and go to all appointments, and call your doctor if you are having problems. It's also a good idea to know your test results and keep a list of the medicines you take. Where can you learn more? Go to http://nito-maria de jesus.info/. Enter N272 in the search box to learn more about \"Learning About Stroke Rehabilitation. \" Current as of: March 20, 2017 Content Version: 11.4 © 3668-9912 [employer]. Care instructions adapted under license by Local[employer] (which disclaims liability or warranty for this information). If you have questions about a medical condition or this instruction, always ask your healthcare professional. [address] any warranty or liability for your use of this information. Learning About How to Prevent Another Stroke What can you do to prevent another stroke? After a stroke, people feel lots of different emotions. Some people are worried that they could have another stroke. Or they may feel overwhelmed by how much there is to learn and do. Some people feel sad or depressed. No matter what emotions you are feeling, you can give yourself some control and peace of mind by making a plan to lower your risk of having another stroke. Take your medicines You'll need to take medicines to help prevent another stroke. Be sure to take your medicines exactly as prescribed. And don't stop taking them unless your doctor tells you to. If you stop taking your medicines, you can increase your risk of having another stroke. Some of the medicines your doctor may prescribe include: · Aspirin or some other blood thinner to prevent blood clots. · Statins to lower cholesterol. · Blood pressure medicines to lower blood pressure. Manage other health problems You can help lower your chance of having another stroke by managing certain other health problems. Problems that increase your risk of having another stroke include: · High blood pressure. · High cholesterol. · Atrial fibrillation. · Diabetes. If you have any of these health problems, you can manage them with healthy lifestyle changes along with medicine. Adopt a healthy lifestyle · Do not smoke or allow others to smoke around you. If you need help quitting, talk to your doctor about stop-smoking programs and medicines. These can increase your chances of quitting for good. Smoking makes a stroke more likely. · Limit alcohol to 2 drinks a day for men and 1 drink a day for women. · Lose weight if you need to. Controlling your weight will help you keep your heart and body healthy. · Be active. Ask your doctor what type and level of activity is safe for you. · Eat heart-healthy foods, like fruits, vegetables, and high-fiber foods. It's also important to: · Get a flu shot every year. · Ask for help if you think you are depressed. Do stroke rehab Taking part in a stroke rehabilitation (rehab) program will help you to regain skills you lost or make the most of your abilities after a stroke. It also helps you take steps to prevent another stroke. Your rehab team will give you education and support to help you build new, healthy habits. You'll learn how to manage any other health problems that you might have. Radha Mejia also learn how to exercise safely, eat a healthy diet, and quit smoking if you smoke. [place] Single work with your team to decide what lifestyle choices are best for you. If your doctor hasn't already suggested it, ask him or her if stroke rehab is right for you. Know stroke symptoms Make sure you know the symptoms of stroke. FAST is a simple way to remember. Recognizing these symptoms helps you know when to call for medical help. FAST stands for: 
· Face drooping. · Arm weakness. · Speech difficulty. · Time to call 911. Follow-up care is a key part of your treatment and safety. Be sure to make and go to all appointments, and call your doctor if you are having problems. It's also a good idea to know your test results and keep a list of the medicines you take. Where can you learn more? Go to http://nito-maria de jesus.info/. Enter K155 in the search box to learn more about \"Learning About How to Prevent Another Stroke. \" Current as of: March 20, 2017 Content Version: 11.4 © 4648-2581 [employer]. Care instructions adapted under license by [employer] (which disclaims liability or warranty for this information). If you have questions about a medical condition or this instruction, always ask your healthcare professional. [address] any warranty or liability for your use of this information. Indigestion (Dyspepsia or Heartburn): Care Instructions Your Care Instructions Sometimes it can be hard to pinpoint the cause of indigestion. (It is also called dyspepsia or heartburn.) Most cases of an upset stomach with bloating, burning, burping, and nausea are minor and go away within several hours. Home treatment and over-the-counter medicine often are able to control symptoms. But if you take medicine to relieve your indigestion without making diet and lifestyle changes, your symptoms are likely to return again and again. If you get indigestion often, it may be a sign of a more serious medical problem. Be sure to follow up with your doctor, who may want to do tests to be sure of the cause of your indigestion. Follow-up care is a key part of your treatment and safety. Be sure to make and go to all appointments, and call your doctor if you are having problems. It's also a good idea to know your test results and keep a list of the medicines you take. How can you care for yourself at home? · Your doctor may recommend over-the-counter medicine.  For mild or occasional indigestion, antacids such as Gaviscon, Mylanta, Maalox, or Tums, may help. Be safe with medicines. Be careful when you take over-the-counter antacid medicines. Many of these medicines have aspirin in them. Read the label to make sure that you are not taking more than the recommended dose. Too much aspirin can be harmful. · Your doctor also may recommend over-the-counter acid reducers, such as Pepcid AC, Tagamet HB, Zantac 75, or Prilosec. Read and follow all instructions on the label. If you use these medicines often, talk with your doctor. · Change your eating habits. ¨ It's best to eat several small meals instead of two or three large meals. ¨ After you eat, wait 2 to 3 hours before you lie down. ¨ Chocolate, mint, and alcohol can make GERD worse. ¨ Spicy foods, foods that have a lot of acid (like tomatoes and oranges), and coffee can make GERD symptoms worse in some people. If your symptoms are worse after you eat a certain food, you may want to stop eating that food to see if your symptoms get better. · Do not smoke or chew tobacco. Smoking can make GERD worse. If you need help quitting, talk to your doctor about stop-smoking programs and medicines. These can increase your chances of quitting for good. · If you have GERD symptoms at night, raise the head of your bed 6 to 8 inches. You can do this by putting the frame on blocks or placing a foam wedge under the head of your mattress. (Adding extra pillows does not work.) · Do not wear tight clothing around your middle. · Lose weight if you need to. Losing just 5 to 10 pounds can help. · Do not take anti-inflammatory medicines, such as aspirin, ibuprofen (Advil, Motrin), or naproxen (Aleve). These can irritate the stomach. If you need a pain medicine, try acetaminophen (Tylenol), which does not cause stomach upset. When should you call for help? Call your doctor now or seek immediate medical care if: ? · You have new or worse belly pain. ? · You are vomiting. ? Watch closely for changes in your health, and be sure to contact your doctor if: 
? · You have new or worse symptoms of indigestion. ? · You have trouble or pain swallowing. ? · You are losing weight. ? · You do not get better as expected. Where can you learn more? Go to http://nito-maria de jesus.info/. Enter G827 in the search box to learn more about \"Indigestion (Dyspepsia or Heartburn): Care Instructions. \" Current as of: May 12, 2017 Content Version: 11.4 © 9508-7058 TSCA. Care instructions adapted under license by Spockly (which disclaims liability or warranty for this information). If you have questions about a medical condition or this instruction, always ask your healthcare professional. [place] [address] any warranty or liability for your use of this information. Upper GI Endoscopy: What to Expect at [facility] Your Recovery After you have an endoscopy, you will stay at the hospital or clinic for 1 to 2 hours. This will allow the medicine to wear off. You will be able to go home after your doctor or nurse checks to make sure you are not having any problems. You may have to stay overnight if you had treatment during the test. You may have a sore throat for a day or two after the test. 
This care sheet gives you a general idea about what to expect after the test. 
How can you care for yourself at home? Activity · Rest as much as you need to after you go home. · You should be able to go back to your usual activities the day after the test. 
Diet · Follow your doctor's directions for eating after the test. 
· Drink plenty of fluids (unless your doctor has told you not to). Medications · If you have a sore throat the day after the test, use an over-the-counter spray to numb your throat. Follow-up care is a key part of your treatment and safety. Be sure to make and go to all appointments, and call your doctor if you are having problems. It's also a good idea to know your test results and keep a list of the medicines you take. When should you call for help? Call 911 anytime you think you may need emergency care. For example, call if: 
? · You passed out (loses consciousness). ? · You have trouble breathing. ? · You pass maroon or bloody stools. ?Call your doctor now or seek immediate medical care if: 
? · You have pain that does not get better after your take pain medicine. ? · You have new or worse belly pain. ? · You have blood in your stools. ? · You are sick to your stomach and cannot keep fluids down. ? · You have a fever. ? · You cannot pass stools or gas. ? Watch closely for changes in your health, and be sure to contact your doctor if: 
? · Your throat still hurts after a day or two. ? · You do not get better as expected. Where can you learn more? Go to http://nito-maria de jesus.info/. Enter [phone] in the search box to learn more about \"Upper GI Endoscopy: What to Expect at Home. \" Current as of: May 12, 2017 Content Version: 11.4 © 4275-0730 Healthwise, [employer]orporated. Care instructions adapted under license by Hall (which disclaims liability or warranty for this information). If you have questions about a medical condition or this instruction, always ask your healthcare professional. [place] [address] any warranty or liability for your use of this information. Please provide this summary of care documentation to your next provider. Signatures-by signing, you are acknowledging that this After Visit Summary has been reviewed with you and you have received a copy. Patient Signature:  ____________________________________________________________ Date:  ____________________________________________________________  
  
Rexann Ports Provider Signature:  ____________________________________________________________ Date:  ____________________________________________________________

## 2018-06-21 NOTE — H&P
[facility]  [address], [facility] [address]  [phone]    Admission History and Physical      NAME:  Kourtney Ryan   :   1940   MRN:  [id number]     PCP:  Harlan Haney MD     Date/Time:  2018         Subjective:     CHIEF COMPLAINT: nausea, vomiting, unsteady gait and vertigo      HISTORY OF PRESENT ILLNESS:     Mr. Heydi Adan is a 76 y.o.  male who is admitted with CVA. Mr. Heydi Adan with PMH of DM, HTN, hyperlipidemia, hypothyroidism, BPH, vertigo presented to ER c/o nausea, vomiting, unsteady gait and vertigo. The vomiting started about 2-3 days ago and has been worsening progressively and is associated with epigastric abdominal pain. The pain is burning I nature. Denies hematemesis or constipation. nausea got better after he was given zofran in ER. His epigastric pain gets better after vomiting. He has been having unsteady gait and was evaluated by ENT and was diagnosed vertigo 2 weeks ago. Denies focal weakness. Past Medical History:   Diagnosis Date    BPH (benign prostatic hyperplasia)     Diabetes ([address]     HTN (hypertension)     Hyperlipidemia     Thyroid disease     Hypo thyroid    Type II or unspecified type diabetes mellitus without mention of complication, uncontrolled         Past Surgical History:   Procedure Laterality Date    HX ORTHOPAEDIC  L shoulder repair    HX ORTHOPAEDIC      Left shoulder       Social History   Substance Use Topics    Smoking status: Former Smoker    Smokeless tobacco: Never Used      Comment: Smoked 50 years ago    Alcohol use No        Family History   Problem Relation Age of Onset    Diabetes Mother     Cancer Mother     Diabetes Father     Diabetes Sister         No Known Allergies     Prior to Admission medications    Medication Sig Start Date End Date Taking?  Authorizing Provider   docusate sodium (COLACE) 50 mg capsule Take two tabs twice daily for two weeks, then twice a day as needed thereafter. Hold for loose stools. 1/19/18   Robert Negrete MD   ondansetron (ZOFRAN ODT) 8 mg disintegrating tablet Take 1 Tab by mouth every eight (8) hours as needed for Nausea. 1/19/18   Robert Negrete MD   oxyCODONE-acetaminophen (PERCOCET) 5-325 mg per tablet 1-2 tabs every 4hrs as needed for pain. Maximum daily dose 12 tablets. 1/19/18   Robert Negrete MD   insulin detemir (LEVEMIR) 100 unit/mL injection 65 Units by SubCUTAneous route nightly. Historical Provider   Omega-3 Fatty Acids (FISH OIL) 500 mg cap Take 1,000 mg by mouth daily. Historical Provider   glimepiride (AMARYL) 4 mg tablet TAKE 1 TABLET EVERY MORNING 12/21/17   Cristian Holbrook MD   atenolol (TENORMIN) 50 mg tablet  2/11/16   Historical Provider   fenofibrate micronized (LOFIBRA) 134 mg capsule  2/3/16   Historical Provider   RUBENS PEN NEEDLE 32 x 5/32 \" ndle USE TO INJECT LANTUTS DAILY 9/27/15   Cristian Holbrook MD   metFORMIN (GLUCOPHAGE) 1,000 mg tablet Take 1 Tab by mouth two (2) times daily (with meals). Patient taking differently: Take 1,000 mg by mouth daily (with dinner). 8/3/15   Cristian Holbrook MD   Lancets misc Use to check blood sugar 3 times daily 11/18/14   Cristian Holbrook MD   glucose blood VI test strips (ONE TOUCH ULTRA TEST) strip Use to test blood sugar 3 times daily 11/18/14   Cristian Holbrook MD   glucose blood VI test strips (ASCENSIA AUTODISC VI, ONE TOUCH ULTRA TEST VI) strip Use to test blood sugar 3 times daily 9/29/14   Cristian Holbrook MD   lisinopril (PRINIVIL, ZESTRIL) 40 mg tablet Take 40 mg by mouth daily. Historical Provider   levothyroxine (SYNTHROID) 50 mcg tablet Take  by mouth Daily (before breakfast). Historical Provider   omega-3 fatty acids-vitamin e (FISH OIL) 1,000 mg cap Take 1 Cap by mouth. Historical Provider   aspirin delayed-release 81 mg tablet Take  by mouth daily.     Historical Provider   terazosin (HYTRIN) 2 mg capsule Take 2 mg by mouth nightly.     Historical Provider         Review of Systems:  (bold if positive, if negative)    Gen:  Eyes:  ENT:  CVS:  Pulm:  GI:  Abdominal pain, nausea, emesis  :    MS:  Skin:  Psych:  Endo:    Hem:  Renal:    Neuro:            Objective:      VITALS:    Vital signs reviewed; most recent are:    Visit Vitals    /66    Pulse 72    Temp 97.6 °F (36.4 °C)    Resp 15    Ht 5' 11\" (1.803 m)    Wt 99.3 kg (219 lb)    SpO2 96%    BMI 30.54 kg/m2     SpO2 Readings from Last 6 Encounters:   06/21/18 96%   01/19/18 100%   01/17/18 97%   06/27/16 95%   03/18/16 98%   11/18/15 98%        No intake or output data in the 24 hours ending 06/21/18 1613         Exam:     Physical Exam:    Gen:  Well-developed, well-nourished, in no acute distress  HEENT:  Pink conjunctivae, PERRL, hearing intact to voice, moist mucous membranes  Neck:  Supple, without masses, thyroid non-tender  Resp:  No accessory muscle use, clear breath sounds without wheezes rales or rhonchi  Card:  No murmurs, normal S1, S2 without thrills, bruits or peripheral edema  Abd:  Soft, non-tender, non-distended, normoactive bowel sounds are present, no palpable organomegaly and no detectable hernias  Lymph:  No cervical or inguinal adenopathy  Musc:  No cyanosis or clubbing  Skin:  No rashes or ulcers, skin turgor is good  Neuro:  Cranial nerves are grossly intact, no focal motor weakness, follows commands appropriately  Psych:  Good insight, oriented to person, place and time, alert       Labs:    Recent Labs      06/21/18   1421   WBC  16.0*   HGB  13.8   HCT  40.8   PLT  360     Recent Labs      06/21/18   1421   NA  137   K  3.6   CL  97   CO2  28   GLU  369*   BUN  39*   CREA  2.20*   CA  8.9   MG  1.8   ALB  3.6   TBILI  0.6   SGOT  18   ALT  23     Lab Results   Component Value Date/Time    Glucose (POC) 104 (H) 01/19/2018 06:36 PM    Glucose (POC) 69 01/19/2018 06:14 PM     No results for input(s): PH, PCO2, PO2, HCO3, FIO2 in the last 72 hours. No results for input(s): INR in the last 72 hours. No lab exists for component: INREXT    Telemetry reviewed:   Prolonged QT       Assessment/Plan:    1. Acute on chronic CVA (cerebral vascular accident) ([facility] (6/21/2018). CT result noted. Start ASA, check lipids. Keep NPO, PT/OT and SLP evaluation. Consult neurology. Check BL carotid doppler and echocardiogram. Neuro watch. 2.  Epigastric abdominal pain (6/21/2018)/ Nausea and vomiting (6/21/2018). Possible due to gastritis. Start PPI and consult GI. U/S of the abdomen without acute finding. Symptomatic treatment with anti emetics. 3.  Acute on chronic renal failure ([facility] (6/21/2018). This is likely secondary to persistent vomiting and poor PO intake. Continue IVF      4. Hypothyroidism due to acquired atrophy of thyroid (11/18/2015). Continue synthroid. Check TSH    5. Essential hypertension (11/18/2015). Continue home atenolol. Hold lisinopril due top worsening renal function     6. Type 2 diabetes mellitus with diabetic nephropathy ([facility] (11/18/2015). Check A1C. Cover with SSI as pt is NPO. Hold levemir for now. 7.  Hyperlipidemia (6/21/2018). check lipid panel. 8.  BPH (benign prostatic hyperplasia) (6/21/2018). On terazosin     9. Obesity (2/16/2015). Would benefit from weight loss    10. Leukocytosis. Afebrile. Check UA. No ABx for now.          Code status: DNR              Previous medical records reviewed     Risk of deterioration: high      Total time spent with patient: [phone] [address] discussed with: Patient, Family, Nursing Staff and >50% of time spent in counseling and coordination of care    Discussed:  Care Plan and D/C Planning    Prophylaxis:  Hep SQ    Probable Disposition:  Home w/Family           ___________________________________________________    Attending Physician: Gilford Saunders, MD

## 2018-06-21 NOTE — Clinical Note
Status[de-identified] Inpatient [101] Type of Bed: Telemetry [19] Inpatient Hospitalization Certified Necessary for the Following Reasons: 3. Patient receiving treatment that can only be provided in an inpatient setting (further clarification in H&P documentation) Admitting Diagnosis: CVA (cerebral vascular accident) [facility]) [[id number]] Admitting Physician: Moy Harris Attending Physician: Moy Harris Estimated Length of Stay: 3-4 Midnights Discharge Plan[de-identified] Extended Care Facility (e.g. Adult Home, Nursing Home, etc.)

## 2018-06-21 NOTE — ED NOTES
Bedside and Verbal shift change report given to Jadon Lewis (oncoming nurse) by Sp Martinez (offgoing nurse). Report included the following information SBAR, ED Summary, MAR and Recent Results.

## 2018-06-21 NOTE — ED NOTES
Bedside shift change report given to xenia fontenot (oncoming nurse) by xenia culver (offgoing nurse). Report included the following information SBAR, Kardex and ED Summary.

## 2018-06-21 NOTE — IP AVS SNAPSHOT
[address] [place] [phone] Patient: Cy Dalton MRN: [id number] EWX:[birth year] A check wale indicates which time of day the medication should be taken. My Medications START taking these medications Instructions Each Dose to Equal  
 Morning Noon Evening Bedtime  
 pantoprazole 40 mg tablet Commonly known as:  PROTONIX Your last dose was: Your next dose is: Take 1 Tab by mouth Before breakfast and dinner. 40 mg  
    
   
   
   
  
 sucralfate 100 mg/mL suspension Commonly known as:  Ricco Gilbert Your last dose was: Your next dose is: Take 10 mL by mouth two (2) times a day for 14 days. 1 g CHANGE how you take these medications Instructions Each Dose to Equal  
 Morning Noon Evening Bedtime  
 insulin detemir U-100 100 unit/mL injection Commonly known as:  LEVEMIR U-100 INSULIN What changed:  how much to take Your last dose was: Your next dose is:    
   
   
 30 Units by SubCUTAneous route daily. 30 Units CONTINUE taking these medications Instructions Each Dose to Equal  
 Morning Noon Evening Bedtime  
 aspirin delayed-release 81 mg tablet Your last dose was: Your next dose is: Take 81 mg by mouth daily. 81 mg  
    
   
   
   
  
 atenolol 50 mg tablet Commonly known as:  TENORMIN Your last dose was: Your next dose is: Take 50 mg by mouth daily. 50 mg  
    
   
   
   
  
 escitalopram oxalate 10 mg tablet Commonly known as:  Star Aas Your last dose was: Your next dose is: Take 10 mg by mouth daily. 10 mg  
    
   
   
   
  
 fenofibrate micronized 134 mg capsule Commonly known as:  LOFIBRA Your last dose was: Your next dose is: Take 134 mg by mouth daily. 134 mg FISH OIL 1,000 mg Cap Generic drug:  omega-3 fatty acids-vitamin e Your last dose was: Your next dose is: Take 3 Caps by mouth daily. 3 Cap  
    
   
   
   
  
 glimepiride 4 mg tablet Commonly known as:  AMARYL Your last dose was: Your next dose is: TAKE 1 TABLET EVERY MORNING  
     
   
   
   
  
 levothyroxine 75 mcg tablet Commonly known as:  SYNTHROID Your last dose was: Your next dose is: Take 75 mcg by mouth Daily (before breakfast). 75 mcg  
    
   
   
   
  
 lisinopril 40 mg tablet Commonly known as:  Aníbal Gormanorn Your last dose was: Your next dose is: Take 40 mg by mouth daily. 40 mg NovoLOG Flexpen U-100 Insulin 100 unit/mL Inpn Generic drug:  insulin aspart U-100 Your last dose was: Your next dose is:    
   
   
 8 Units by SubCUTAneous route Before breakfast, lunch, and dinner. 8 Units  
    
   
   
   
  
 terazosin 2 mg capsule Commonly known as:  HYTRIN Your last dose was: Your next dose is: Take 2 mg by mouth nightly. 2 mg VITAMIN B-12 100 mcg tablet Generic drug:  cyanocobalamin Your last dose was: Your next dose is: Take 100 mcg by mouth daily. 100 mcg Where to Get Your Medications Information on where to get these meds will be given to you by the nurse or doctor. ! Ask your nurse or doctor about these medications  
  insulin detemir U-100 100 unit/mL injection  
 pantoprazole 40 mg tablet  
 sucralfate 100 mg/mL suspension

## 2018-06-21 NOTE — PROGRESS NOTES
[facility]: MED RECONCILIATION    Comments/Recommendations:    Medication reconciliation completed with medication list from  outside MDs office updated on 5/29/18.  Patient stated he did not take any medications today. Medications added:     · Novolog  · escitalopram    Medications removed:    · Docusate  · Metformin  · Zofran  · Percocet     Medications adjusted:    · Levothyroxine 75mcg daily adjusted from 50mcg  · Insulin detemir 62 units daily adjusted from 65 units daily     Information obtained from: medication list, Rx query      Allergies: Review of patient's allergies indicates no known allergies. Prior to Admission Medications:     Medication Documentation Review Audit       Reviewed by Amie Tapia (Pharmacist) on 06/21/18 at 1716         Medication Sig Documenting Provider Last Dose Status Taking?      aspirin delayed-release 81 mg tablet Take 81 mg by mouth daily. Historical Provider 6/20/2018 Active Yes    atenolol (TENORMIN) 50 mg tablet Take 50 mg by mouth daily. Historical Provider 6/20/2018 Active Yes             Med Note (AMIE TAPIA   Thu Jun 21, 2018  5:13 PM):        cyanocobalamin (VITAMIN B-12) 100 mcg tablet Take 100 mcg by mouth daily. Historical Provider 6/20/2018 Active Yes    escitalopram oxalate (LEXAPRO) 10 mg tablet Take 10 mg by mouth daily. Historical Provider 6/20/2018 Active Yes    fenofibrate micronized (LOFIBRA) 134 mg capsule Take 134 mg by mouth daily. Historical Provider 6/20/2018 Unknown time Active Yes             Med Note (AMIE TAPIA   Thu Jun 21, 2018  5:15 PM):        glimepiride (AMARYL) 4 mg tablet TAKE 1 TABLET EVERY MORNING Diann Booker MD 6/20/2018 Active Yes    insulin aspart U-100 (NOVOLOG FLEXPEN U-100 INSULIN) 100 unit/mL inpn 8 Units by SubCUTAneous route Before breakfast, lunch, and dinner. Historical Provider 6/20/2018 Active Yes    insulin detemir (LEVEMIR) 100 unit/mL injection 62 Units by SubCUTAneous route daily. Historical Provider 6/20/2018 Active Yes    levothyroxine (SYNTHROID) 75 mcg tablet Take 75 mcg by mouth Daily (before breakfast). Historical Provider 6/20/2018 Active Yes    lisinopril (PRINIVIL, ZESTRIL) 40 mg tablet Take 40 mg by mouth daily. Historical Provider 6/20/2018 Active Yes    omega-3 fatty acids-vitamin e (FISH OIL) 1,000 mg cap Take 3 Caps by mouth daily. Historical Provider 6/20/2018 Active Yes    terazosin (HYTRIN) 2 mg capsule Take 2 mg by mouth nightly.  Historical Provider 6/20/2018 Active Yes                    Thank Camelia Cartagena, PharmD   Contact: [phone]

## 2018-06-22 ENCOUNTER — APPOINTMENT (OUTPATIENT)
Dept: MRI IMAGING | Age: 78
DRG: 064 | End: 2018-06-22
Attending: INTERNAL MEDICINE
Payer: MEDICARE

## 2018-06-22 ENCOUNTER — ANESTHESIA EVENT (OUTPATIENT)
Dept: ENDOSCOPY | Age: 78
DRG: 064 | End: 2018-06-22
Payer: MEDICARE

## 2018-06-22 ENCOUNTER — ANESTHESIA (OUTPATIENT)
Dept: ENDOSCOPY | Age: 78
DRG: 064 | End: 2018-06-22
Payer: MEDICARE

## 2018-06-22 LAB
ANION GAP SERPL CALC-SCNC: 6 MMOL/L (ref 5–15)
APPEARANCE UR: CLEAR
BACTERIA URNS QL MICRO: NEGATIVE /HPF
BASOPHILS # BLD: 0 K/UL (ref 0–0.1)
BASOPHILS NFR BLD: 0 % (ref 0–1)
BILIRUB UR QL: NEGATIVE
BUN SERPL-MCNC: 41 MG/DL (ref 6–20)
BUN/CREAT SERPL: 22 (ref 12–20)
CALCIUM SERPL-MCNC: 8.2 MG/DL (ref 8.5–10.1)
CHLORIDE SERPL-SCNC: 105 MMOL/L (ref 97–108)
CHOLEST SERPL-MCNC: 107 MG/DL
CO2 SERPL-SCNC: 28 MMOL/L (ref 21–32)
COLOR UR: ABNORMAL
CREAT SERPL-MCNC: 1.83 MG/DL (ref 0.7–1.3)
DIFFERENTIAL METHOD BLD: ABNORMAL
EOSINOPHIL # BLD: 0 K/UL (ref 0–0.4)
EOSINOPHIL NFR BLD: 0 % (ref 0–7)
EPITH CASTS URNS QL MICRO: ABNORMAL /LPF
ERYTHROCYTE [DISTWIDTH] IN BLOOD BY AUTOMATED COUNT: 13.6 % (ref 11.5–14.5)
EST. AVERAGE GLUCOSE BLD GHB EST-MCNC: 229 MG/DL
GLUCOSE BLD STRIP.AUTO-MCNC: 152 MG/DL (ref 65–100)
GLUCOSE BLD STRIP.AUTO-MCNC: 184 MG/DL (ref 65–100)
GLUCOSE BLD STRIP.AUTO-MCNC: 209 MG/DL (ref 65–100)
GLUCOSE BLD STRIP.AUTO-MCNC: 210 MG/DL (ref 65–100)
GLUCOSE BLD STRIP.AUTO-MCNC: 259 MG/DL (ref 65–100)
GLUCOSE SERPL-MCNC: 196 MG/DL (ref 65–100)
GLUCOSE UR STRIP.AUTO-MCNC: >1000 MG/DL
HBA1C MFR BLD: 9.6 % (ref 4.2–6.3)
HCT VFR BLD AUTO: 38.2 % (ref 36.6–50.3)
HDLC SERPL-MCNC: 37 MG/DL
HDLC SERPL: 2.9 {RATIO} (ref 0–5)
HGB BLD-MCNC: 12.3 G/DL (ref 12.1–17)
HGB UR QL STRIP: NEGATIVE
HYALINE CASTS URNS QL MICRO: ABNORMAL /LPF (ref 0–5)
IMM GRANULOCYTES # BLD: 0.1 K/UL (ref 0–0.04)
IMM GRANULOCYTES NFR BLD AUTO: 0 % (ref 0–0.5)
KETONES UR QL STRIP.AUTO: ABNORMAL MG/DL
LDLC SERPL CALC-MCNC: 35.2 MG/DL (ref 0–100)
LEUKOCYTE ESTERASE UR QL STRIP.AUTO: NEGATIVE
LIPID PROFILE,FLP: ABNORMAL
LYMPHOCYTES # BLD: 2.4 K/UL (ref 0.8–3.5)
LYMPHOCYTES NFR BLD: 16 % (ref 12–49)
MCH RBC QN AUTO: 28.5 PG (ref 26–34)
MCHC RBC AUTO-ENTMCNC: 32.2 G/DL (ref 30–36.5)
MCV RBC AUTO: 88.4 FL (ref 80–99)
MONOCYTES # BLD: 0.9 K/UL (ref 0–1)
MONOCYTES NFR BLD: 6 % (ref 5–13)
NEUTS SEG # BLD: 11.4 K/UL (ref 1.8–8)
NEUTS SEG NFR BLD: 77 % (ref 32–75)
NITRITE UR QL STRIP.AUTO: NEGATIVE
NRBC # BLD: 0 K/UL (ref 0–0.01)
NRBC BLD-RTO: 0 PER 100 WBC
PH UR STRIP: 7 [PH] (ref 5–8)
PLATELET # BLD AUTO: 300 K/UL (ref 150–400)
PMV BLD AUTO: 10 FL (ref 8.9–12.9)
POTASSIUM SERPL-SCNC: 3.6 MMOL/L (ref 3.5–5.1)
PROT UR STRIP-MCNC: ABNORMAL MG/DL
RBC # BLD AUTO: 4.32 M/UL (ref 4.1–5.7)
RBC #/AREA URNS HPF: ABNORMAL /HPF (ref 0–5)
SERVICE CMNT-IMP: ABNORMAL
SODIUM SERPL-SCNC: 139 MMOL/L (ref 136–145)
SP GR UR REFRACTOMETRY: 1.01 (ref 1–1.03)
TRIGL SERPL-MCNC: 174 MG/DL (ref ?–150)
TSH SERPL DL<=0.05 MIU/L-ACNC: 2.31 UIU/ML (ref 0.36–3.74)
UROBILINOGEN UR QL STRIP.AUTO: 1 EU/DL (ref 0.2–1)
VLDLC SERPL CALC-MCNC: 34.8 MG/DL
WBC # BLD AUTO: 14.8 K/UL (ref 4.1–11.1)
WBC URNS QL MICRO: ABNORMAL /HPF (ref 0–4)

## 2018-06-22 PROCEDURE — 74011250637 HC RX REV CODE- 250/637: Performed by: INTERNAL MEDICINE

## 2018-06-22 PROCEDURE — 70547 MR ANGIOGRAPHY NECK W/O DYE: CPT

## 2018-06-22 PROCEDURE — 77030009426 HC FCPS BIOP ENDOSC BSC -B: Performed by: INTERNAL MEDICINE

## 2018-06-22 PROCEDURE — 74011250636 HC RX REV CODE- 250/636: Performed by: INTERNAL MEDICINE

## 2018-06-22 PROCEDURE — 80048 BASIC METABOLIC PNL TOTAL CA: CPT | Performed by: INTERNAL MEDICINE

## 2018-06-22 PROCEDURE — 74011250636 HC RX REV CODE- 250/636

## 2018-06-22 PROCEDURE — 74011636637 HC RX REV CODE- 636/637: Performed by: INTERNAL MEDICINE

## 2018-06-22 PROCEDURE — 92610 EVALUATE SWALLOWING FUNCTION: CPT

## 2018-06-22 PROCEDURE — 97530 THERAPEUTIC ACTIVITIES: CPT

## 2018-06-22 PROCEDURE — 0DB58ZX EXCISION OF ESOPHAGUS, VIA NATURAL OR ARTIFICIAL OPENING ENDOSCOPIC, DIAGNOSTIC: ICD-10-PCS | Performed by: INTERNAL MEDICINE

## 2018-06-22 PROCEDURE — 70551 MRI BRAIN STEM W/O DYE: CPT

## 2018-06-22 PROCEDURE — 82962 GLUCOSE BLOOD TEST: CPT

## 2018-06-22 PROCEDURE — 65660000000 HC RM CCU STEPDOWN

## 2018-06-22 PROCEDURE — 80061 LIPID PANEL: CPT | Performed by: INTERNAL MEDICINE

## 2018-06-22 PROCEDURE — 88312 SPECIAL STAINS GROUP 1: CPT | Performed by: INTERNAL MEDICINE

## 2018-06-22 PROCEDURE — 74011000250 HC RX REV CODE- 250

## 2018-06-22 PROCEDURE — 85025 COMPLETE CBC W/AUTO DIFF WBC: CPT | Performed by: INTERNAL MEDICINE

## 2018-06-22 PROCEDURE — 76060000031 HC ANESTHESIA FIRST 0.5 HR: Performed by: INTERNAL MEDICINE

## 2018-06-22 PROCEDURE — C9113 INJ PANTOPRAZOLE SODIUM, VIA: HCPCS | Performed by: INTERNAL MEDICINE

## 2018-06-22 PROCEDURE — 96374 THER/PROPH/DIAG INJ IV PUSH: CPT

## 2018-06-22 PROCEDURE — 88305 TISSUE EXAM BY PATHOLOGIST: CPT | Performed by: INTERNAL MEDICINE

## 2018-06-22 PROCEDURE — 70544 MR ANGIOGRAPHY HEAD W/O DYE: CPT

## 2018-06-22 PROCEDURE — 36415 COLL VENOUS BLD VENIPUNCTURE: CPT | Performed by: INTERNAL MEDICINE

## 2018-06-22 PROCEDURE — 97165 OT EVAL LOW COMPLEX 30 MIN: CPT | Performed by: OCCUPATIONAL THERAPIST

## 2018-06-22 PROCEDURE — 72141 MRI NECK SPINE W/O DYE: CPT

## 2018-06-22 PROCEDURE — 97116 GAIT TRAINING THERAPY: CPT

## 2018-06-22 PROCEDURE — 97161 PT EVAL LOW COMPLEX 20 MIN: CPT

## 2018-06-22 PROCEDURE — 76040000019: Performed by: INTERNAL MEDICINE

## 2018-06-22 PROCEDURE — 84443 ASSAY THYROID STIM HORMONE: CPT | Performed by: INTERNAL MEDICINE

## 2018-06-22 PROCEDURE — 83036 HEMOGLOBIN GLYCOSYLATED A1C: CPT | Performed by: INTERNAL MEDICINE

## 2018-06-22 RX ORDER — SODIUM CHLORIDE 9 MG/ML
INJECTION, SOLUTION INTRAVENOUS
Status: DISCONTINUED | OUTPATIENT
Start: 2018-06-22 | End: 2018-06-22 | Stop reason: HOSPADM

## 2018-06-22 RX ORDER — PROPOFOL 10 MG/ML
INJECTION, EMULSION INTRAVENOUS AS NEEDED
Status: DISCONTINUED | OUTPATIENT
Start: 2018-06-22 | End: 2018-06-22 | Stop reason: HOSPADM

## 2018-06-22 RX ORDER — NALOXONE HYDROCHLORIDE 0.4 MG/ML
0.4 INJECTION, SOLUTION INTRAMUSCULAR; INTRAVENOUS; SUBCUTANEOUS
Status: DISCONTINUED | OUTPATIENT
Start: 2018-06-22 | End: 2018-06-22 | Stop reason: HOSPADM

## 2018-06-22 RX ORDER — SUCRALFATE 1 G/10ML
1 SUSPENSION ORAL 2 TIMES DAILY
Status: DISCONTINUED | OUTPATIENT
Start: 2018-06-22 | End: 2018-06-26 | Stop reason: HOSPADM

## 2018-06-22 RX ORDER — FLUMAZENIL 0.1 MG/ML
0.2 INJECTION INTRAVENOUS
Status: DISCONTINUED | OUTPATIENT
Start: 2018-06-22 | End: 2018-06-22 | Stop reason: HOSPADM

## 2018-06-22 RX ORDER — EPINEPHRINE 0.1 MG/ML
1 INJECTION INTRACARDIAC; INTRAVENOUS
Status: DISCONTINUED | OUTPATIENT
Start: 2018-06-22 | End: 2018-06-22 | Stop reason: HOSPADM

## 2018-06-22 RX ORDER — GUAIFENESIN 100 MG/5ML
81 LIQUID (ML) ORAL DAILY
Status: DISCONTINUED | OUTPATIENT
Start: 2018-06-23 | End: 2018-06-26 | Stop reason: HOSPADM

## 2018-06-22 RX ORDER — PANTOPRAZOLE SODIUM 40 MG/10ML
40 INJECTION, POWDER, LYOPHILIZED, FOR SOLUTION INTRAVENOUS EVERY 12 HOURS
Status: DISCONTINUED | OUTPATIENT
Start: 2018-06-22 | End: 2018-06-24

## 2018-06-22 RX ORDER — GLYCOPYRROLATE 0.2 MG/ML
INJECTION INTRAMUSCULAR; INTRAVENOUS AS NEEDED
Status: DISCONTINUED | OUTPATIENT
Start: 2018-06-22 | End: 2018-06-22 | Stop reason: HOSPADM

## 2018-06-22 RX ORDER — ATROPINE SULFATE 0.1 MG/ML
0.4 INJECTION INTRAVENOUS
Status: DISCONTINUED | OUTPATIENT
Start: 2018-06-22 | End: 2018-06-22 | Stop reason: HOSPADM

## 2018-06-22 RX ORDER — INSULIN GLARGINE 100 [IU]/ML
30 INJECTION, SOLUTION SUBCUTANEOUS
Status: DISCONTINUED | OUTPATIENT
Start: 2018-06-22 | End: 2018-06-23

## 2018-06-22 RX ORDER — MIDAZOLAM HYDROCHLORIDE 1 MG/ML
.25-5 INJECTION, SOLUTION INTRAMUSCULAR; INTRAVENOUS
Status: DISCONTINUED | OUTPATIENT
Start: 2018-06-22 | End: 2018-06-22 | Stop reason: HOSPADM

## 2018-06-22 RX ORDER — PROPOFOL 10 MG/ML
INJECTION, EMULSION INTRAVENOUS
Status: DISCONTINUED | OUTPATIENT
Start: 2018-06-22 | End: 2018-06-22 | Stop reason: HOSPADM

## 2018-06-22 RX ORDER — LIDOCAINE HYDROCHLORIDE 20 MG/ML
INJECTION, SOLUTION EPIDURAL; INFILTRATION; INTRACAUDAL; PERINEURAL AS NEEDED
Status: DISCONTINUED | OUTPATIENT
Start: 2018-06-22 | End: 2018-06-22 | Stop reason: HOSPADM

## 2018-06-22 RX ORDER — SODIUM CHLORIDE 9 MG/ML
50 INJECTION, SOLUTION INTRAVENOUS CONTINUOUS
Status: DISPENSED | OUTPATIENT
Start: 2018-06-22 | End: 2018-06-22

## 2018-06-22 RX ORDER — INSULIN GLARGINE 100 [IU]/ML
30 INJECTION, SOLUTION SUBCUTANEOUS DAILY
Status: DISCONTINUED | OUTPATIENT
Start: 2018-06-22 | End: 2018-06-22

## 2018-06-22 RX ORDER — DEXTROMETHORPHAN/PSEUDOEPHED 2.5-7.5/.8
1.2 DROPS ORAL
Status: DISCONTINUED | OUTPATIENT
Start: 2018-06-22 | End: 2018-06-22 | Stop reason: HOSPADM

## 2018-06-22 RX ADMIN — Medication 10 ML: at 04:14

## 2018-06-22 RX ADMIN — FENOFIBRATE 145 MG: 145 TABLET ORAL at 22:04

## 2018-06-22 RX ADMIN — SODIUM CHLORIDE 50 ML/HR: 900 INJECTION, SOLUTION INTRAVENOUS at 12:43

## 2018-06-22 RX ADMIN — INSULIN LISPRO 3 UNITS: 100 INJECTION, SOLUTION INTRAVENOUS; SUBCUTANEOUS at 01:49

## 2018-06-22 RX ADMIN — HEPARIN SODIUM 5000 UNITS: 5000 INJECTION, SOLUTION INTRAVENOUS; SUBCUTANEOUS at 22:05

## 2018-06-22 RX ADMIN — PROPOFOL 30 MG: 10 INJECTION, EMULSION INTRAVENOUS at 12:58

## 2018-06-22 RX ADMIN — PROPOFOL 40 MG: 10 INJECTION, EMULSION INTRAVENOUS at 12:54

## 2018-06-22 RX ADMIN — SODIUM CHLORIDE: 9 INJECTION, SOLUTION INTRAVENOUS at 12:15

## 2018-06-22 RX ADMIN — ESCITALOPRAM OXALATE 10 MG: 10 TABLET ORAL at 22:04

## 2018-06-22 RX ADMIN — INSULIN LISPRO 3 UNITS: 100 INJECTION, SOLUTION INTRAVENOUS; SUBCUTANEOUS at 12:12

## 2018-06-22 RX ADMIN — INSULIN GLARGINE 30 UNITS: 100 INJECTION, SOLUTION SUBCUTANEOUS at 22:16

## 2018-06-22 RX ADMIN — PANTOPRAZOLE SODIUM 40 MG: 40 INJECTION, POWDER, FOR SOLUTION INTRAVENOUS at 22:04

## 2018-06-22 RX ADMIN — HEPARIN SODIUM 5000 UNITS: 5000 INJECTION, SOLUTION INTRAVENOUS; SUBCUTANEOUS at 06:15

## 2018-06-22 RX ADMIN — TERAZOSIN HYDROCHLORIDE ANHYDROUS 1 MG: 1 CAPSULE ORAL at 22:04

## 2018-06-22 RX ADMIN — INSULIN LISPRO 5 UNITS: 100 INJECTION, SOLUTION INTRAVENOUS; SUBCUTANEOUS at 17:45

## 2018-06-22 RX ADMIN — Medication 10 ML: at 22:07

## 2018-06-22 RX ADMIN — Medication 10 ML: at 14:27

## 2018-06-22 RX ADMIN — LEVOTHYROXINE SODIUM 75 MCG: 75 TABLET ORAL at 06:15

## 2018-06-22 RX ADMIN — PANTOPRAZOLE SODIUM 40 MG: 40 INJECTION, POWDER, FOR SOLUTION INTRAVENOUS at 09:08

## 2018-06-22 RX ADMIN — ATENOLOL 50 MG: 50 TABLET ORAL at 09:05

## 2018-06-22 RX ADMIN — GLYCOPYRROLATE 0.2 MG: 0.2 INJECTION INTRAMUSCULAR; INTRAVENOUS at 12:53

## 2018-06-22 RX ADMIN — ASPIRIN 325 MG: 325 TABLET ORAL at 09:05

## 2018-06-22 RX ADMIN — LIDOCAINE HYDROCHLORIDE 40 MG: 20 INJECTION, SOLUTION EPIDURAL; INFILTRATION; INTRACAUDAL; PERINEURAL at 12:55

## 2018-06-22 RX ADMIN — SODIUM CHLORIDE 75 ML/HR: 900 INJECTION, SOLUTION INTRAVENOUS at 07:45

## 2018-06-22 RX ADMIN — PROPOFOL 75 MCG/KG/MIN: 10 INJECTION, EMULSION INTRAVENOUS at 12:55

## 2018-06-22 RX ADMIN — SUCRALFATE 1 G: 1 SUSPENSION ORAL at 17:45

## 2018-06-22 RX ADMIN — HEPARIN SODIUM 5000 UNITS: 5000 INJECTION, SOLUTION INTRAVENOUS; SUBCUTANEOUS at 14:26

## 2018-06-22 RX ADMIN — INSULIN LISPRO 2 UNITS: 100 INJECTION, SOLUTION INTRAVENOUS; SUBCUTANEOUS at 06:14

## 2018-06-22 NOTE — PROGRESS NOTES
Care Plan Summary  Problem: Falls - Risk of  Goal: *Absence of Falls  Document Vandana Fall Risk and appropriate interventions in the flowsheet.    Outcome: Progressing Towards Goal  Fall Risk Interventions:  Mobility Interventions: Assess mobility with egress test, Bed/chair exit alarm, Patient to call before getting OOB, OT consult for ADLs, PT Consult for mobility concerns, PT Consult for assist device competence, Utilize walker, cane, or other assitive device         Medication Interventions: Bed/chair exit alarm, Patient to call before getting OOB, Teach patient to arise slowly    Elimination Interventions: Call light in reach, Urinal in reach, Toileting schedule/hourly rounds    History of Falls Interventions: Bed/chair exit alarm, Door open when patient unattended, Investigate reason for fall

## 2018-06-22 NOTE — PROGRESS NOTES
Bedside and Verbal shift change report given to Daisy (oncoming nurse) by Dennise Palomino (offgoing nurse). Report included the following information SBAR, Kardex, MAR and Recent Results.

## 2018-06-22 NOTE — PROCEDURES
[address]  *** FINAL REPORT ***    Name: Cathy Martins  MRN: [id number]    Outpatient  : 18 Sep 1940  HIS Order #: [id number]  [address] [facility] Visit #: [id number]  Date: 2018    TYPE OF TEST: Cerebrovascular Duplex    REASON FOR TEST  Cerebrovascular accident    Right Carotid:-             Proximal               Mid                 Distal  cm/s  Systolic  Diastolic  Systolic  Diastolic  Systolic  Diastolic  CCA:     59.7      10.9                            61.4      13.1  Bulb:  ECA:     73.5       6.5  ICA:     63.7      15.3       54.0      16.5       72.3      19.1  ICA/CCA:  1.0       1.2    ICA Stenosis: <50%    Right Vertebral:-  Finding: Antegrade  Sys:       53.3  Lakeisha:       10.6    Right Subclavian:    Left Carotid:-            Proximal                Mid                 Distal  cm/s  Systolic  Diastolic  Systolic  Diastolic  Systolic  Diastolic  CCA:     95.1      12.1                            63.3       9.9  Bulb:  ECA:     67.1       0.0  ICA:     60.1      15.6       64.5      18.2       75.7      21.9  ICA/CCA:  0.9       1.6    ICA Stenosis: <50%    Left Vertebral:-  Finding: Antegrade  Sys:       50.5  Lakeisha:       12.7    Left Subclavian:    INTERPRETATION/FINDINGS  PROCEDURE:  Evaluation of the extracranial cerebrovascular arteries  with ultrasound (B-mode imaging, pulsed Doppler, color Doppler). Includes the common carotid, internal carotid, external carotid, and  vertebral arteries. FINDINGS: Intimal thickening noted in the bilateral CCA. Plaques  observed in the bilateral bulb and ICA. IMPRESSION: Findings are consistent with 0-49% stenosis of the right  internal carotid and 0-49% stenosis of the left internal carotid. Vertebrals are patent with antegrade flow. ADDITIONAL COMMENTS    I have personally reviewed the data relevant to the interpretation of  this  study. TECHNOLOGIST: Bull Bardales  Signed: 2018 08:10 PM    PHYSICIAN: Jesica Najera.  Amrit Carranza MD  Signed: 06/22/2018 11:26 AM

## 2018-06-22 NOTE — ED NOTES
Report given to inpatient RN to include cardiac rhythm, mental status, care plan and any other pertinent information requested by receiving RN. Pt has no further questions regarding status of care. All needs addressed. Pt transported to floor via [facility].

## 2018-06-22 NOTE — PROGRESS NOTES
2100: TRANSFER - IN REPORT:    Verbal report received from Faustina Anaya RN(name) on Jane Ortez  being received from ED(unit) for routine progression of care      Report consisted of patients Situation, Background, Assessment and   Recommendations(SBAR). Information from the following report(s) SBAR, Kardex, ED Summary, MAR, Accordion, Recent Results and Cardiac Rhythm NSR was reviewed with the receiving nurse. Opportunity for questions and clarification was provided. Assessment completed upon patients arrival to unit and care assumed. Shift Summary  2115: Patient received to unit    2215: Neuro checks, admission assessment completed. No neurological deficits noted except for very unsteady gait/balance. 2300: Telemetry monitor not working, patient placed on dummy box to monitor. 0730: Bedside and Verbal shift change report given to Damian Ortiz RN (oncoming nurse) by Tomas Carreon RN (offgoing nurse). Report included the following information SBAR, Kardex, Procedure Summary, Intake/Output, MAR, Accordion, Recent Results and Cardiac Rhythm NSR.

## 2018-06-22 NOTE — PROGRESS NOTES
Bedside and Verbal shift change report given to Taqueria Xiao (oncoming nurse) by Josie Garcia (offgoing nurse). Report included the following information SBAR, Kardex, Intake/Output and Accordion    Pt reports no pain this morning. Pt is involved and agreeable to plan of care    Bedside and Verbal shift change report given to Peña Preston (oncoming nurse) by [address] (offgoing nurse). Report included the following information SBAR, Kardex, ED Summary, Intake/Output, Accordion, Recent Results, Med Rec Status and Cardiac Rhythm NSR.

## 2018-06-22 NOTE — PROGRESS NOTES
Swallowing eval is pending GI decision for endoscopy today or not. TN states patient took some pills this am without difficulty and no vomiting.  No patient c/o nausea at 945

## 2018-06-22 NOTE — CONSULTS
[address] [place] [address]. [address] [place] [address] NP  [phone]                    GASTROENTEROLOGY CONSULTATION NOTE              NAME:  Walter Hardy   :   1940   MRN:   [id number]       Referring Physician:   Dr. Flaco Cortez Date:   2018 10:02 AM    Chief Complaint:    Vomiting     History of Present Illness:    Patient is a pleasant 76 y.o. male who we were asked to see in consultation for the above complaint. The patient states that he has been experiencing nausea and vomiting for the past three weeks. He denies pain but endorses persistent indigestion located behind his sternum. Nothing makes it better or worse. He has been unable to eat anything for the past few days and has lost 8 lbs over the past few weeks. He has recently been diagnosed with Vertigo and feels his symptoms started as the same time he began to experience Vertigo. He denies blood in vomit and stools. Denies fevers and chills, diarrhea, sick contacts. He does not drink or smoke, uses nsaids occasionally. He says he has had a colonoscopy in 2017 by Dr. Yong De La Torre at [facility].  He states he had one polyp that was removed. He has never had an endoscopy. PMH:  Past Medical History:   Diagnosis Date    BPH (benign prostatic hyperplasia)     Diabetes ([address]     HTN (hypertension)     Hyperlipidemia     Thyroid disease     Hypo thyroid    Type II or unspecified type diabetes mellitus without mention of complication, uncontrolled        PSH:  Past Surgical History:   Procedure Laterality Date    HX ORTHOPAEDIC  L shoulder repair    HX ORTHOPAEDIC      Left shoulder       Allergies:  No Known Allergies    Home Medications:  Prior to Admission Medications   Prescriptions Last Dose Informant Patient Reported? Taking?   aspirin delayed-release 81 mg tablet 2018 Other Yes Yes   Sig: Take 81 mg by mouth daily.    atenolol (TENORMIN) 50 mg tablet 2018 Other Yes Yes   Sig: Take 50 mg by mouth daily. cyanocobalamin (VITAMIN B-12) 100 mcg tablet 2018 Other Yes Yes   Sig: Take 100 mcg by mouth daily. escitalopram oxalate (LEXAPRO) 10 mg tablet 2018 Other Yes Yes   Sig: Take 10 mg by mouth daily. fenofibrate micronized (LOFIBRA) 134 mg capsule 2018 at Unknown time Other Yes Yes   Sig: Take 134 mg by mouth daily. glimepiride (AMARYL) 4 mg tablet 2018 Other No Yes   Sig: TAKE 1 TABLET EVERY MORNING   insulin aspart U-100 (NOVOLOG FLEXPEN U-100 INSULIN) 100 unit/mL inpn 2018 Other Yes Yes   Si Units by SubCUTAneous route Before breakfast, lunch, and dinner. insulin detemir (LEVEMIR) 100 unit/mL injection 2018 Other Yes Yes   Si Units by SubCUTAneous route daily. levothyroxine (SYNTHROID) 75 mcg tablet 2018 Other Yes Yes   Sig: Take 75 mcg by mouth Daily (before breakfast). lisinopril (PRINIVIL, ZESTRIL) 40 mg tablet 2018 Other Yes Yes   Sig: Take 40 mg by mouth daily. omega-3 fatty acids-vitamin e (FISH OIL) 1,000 mg cap 2018 Other Yes Yes   Sig: Take 3 Caps by mouth daily. terazosin (HYTRIN) 2 mg capsule 2018 Other Yes Yes   Sig: Take 2 mg by mouth nightly.       Facility-Administered Medications: None       Hospital Medications:  Current Facility-Administered Medications   Medication Dose Route Frequency    sodium chloride (NS) flush 5-10 mL  5-10 mL IntraVENous Q8H    sodium chloride (NS) flush 5-10 mL  5-10 mL IntraVENous PRN    acetaminophen (TYLENOL) tablet 650 mg  650 mg Oral Q4H PRN    Or    acetaminophen (TYLENOL) solution 650 mg  650 mg Per NG tube Q4H PRN    Or    acetaminophen (TYLENOL) suppository 650 mg  650 mg Rectal Q4H PRN    0.9% sodium chloride infusion  75 mL/hr IntraVENous CONTINUOUS    aspirin (ASPIRIN) tablet 325 mg  325 mg Oral DAILY    pantoprazole (PROTONIX) injection 40 mg  40 mg IntraVENous DAILY    ondansetron (ZOFRAN) injection 4 mg  4 mg IntraVENous Q4H PRN    heparin (porcine) injection 5,000 Units  5,000 Units SubCUTAneous Q8H    insulin lispro (HUMALOG) injection   SubCUTAneous Q6H    glucose chewable tablet 16 g  4 Tab Oral PRN    dextrose (D50W) injection syrg 12.5-25 g  12.5-25 g IntraVENous PRN    glucagon (GLUCAGEN) injection 1 mg  1 mg IntraMUSCular PRN    prochlorperazine (COMPAZINE) injection 10 mg  10 mg IntraVENous Q6H PRN    escitalopram oxalate (LEXAPRO) tablet 10 mg  10 mg Oral DAILY    levothyroxine (SYNTHROID) tablet 75 mcg  75 mcg Oral ACB    terazosin (HYTRIN) capsule 2 mg  2 mg Oral QHS    fenofibrate nanocrystallized (TRICOR) tablet 145 mg  145 mg Oral DAILY    atenolol (TENORMIN) tablet 50 mg  50 mg Oral DAILY       Social History:  Social History   Substance Use Topics    Smoking status: Former Smoker    Smokeless tobacco: Never Used      Comment: Smoked 50 years ago    Alcohol use No       Family History:  Family History   Problem Relation Age of Onset    Diabetes Mother     Cancer Mother     Diabetes Father     Diabetes Sister        Review of Systems:  Constitutional: negative fever, negative chills, negative weight loss  Eyes:   negative visual changes  ENT:   negative sore throat, tongue or lip swelling  Respiratory:  negative cough, negative dyspnea  Cards:  negative for chest pain, palpitations, lower extremity edema  GI:   See HPI  :  negative for frequency, dysuria  Integument:  negative for rash and pruritus  Heme:  negative for easy bruising and gum/nose bleeding  Musculoskel: negative for myalgias,  back pain and muscle weakness  Neuro: negative for headaches, dizziness, vertigo  Psych:  negative for feelings of anxiety, depression     Objective:   Patient Vitals for the past 8 hrs:   BP Temp Pulse Resp SpO2   06/22/18 0903 131/69 97.9 °F (36.6 °C) 60 16 97 %   06/22/18 0411 143/66 98.3 °F (36.8 °C) 64 17 94 %        06/20 1901 - 06/22 0700  In: 692.5 [P.O.:160; I.V.:532.5]  Out: 300 [Urine:300]    EXAM:     NEURO-alert, oriented x3, affect appropriate   HEENT-Head: Normocephalic, no lesions, without obvious abnormality. LUNGS-clear to auscultation bilaterally    COR-regular rate and rhythym     ABD- soft, non-tender. Bowel sounds normal. No masses,  no organomegaly     EXT-no edema    Skin - No rash     Data Review     Recent Labs      06/22/18   0133  06/21/18   1421   WBC  14.8*  16.0*   HGB  12.3  13.8   HCT  38.2  40.8   PLT  300  360     Recent Labs      06/22/18   0133  06/21/18   1421   NA  139  137   K  3.6  3.6   CL  105  97   CO2  28  28   BUN  41*  39*   CREA  1.83*  2.20*   GLU  196*  369*   CA  8.2*  8.9     Recent Labs      06/21/18   1421   SGOT  18   AP  68   TP  7.4   ALB  3.6   GLOB  3.8     No results for input(s): INR, PTP, APTT in the last 72 hours. 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 His ultrasound shows \"Echogenic liver compatible with fatty infiltration or fibrosis. Gallbladder sludge. \"    - NPO  - IVF  - Continue PPI  - Consider EGD Today - will discuss with Dr. Lynch. Attending documentation to follow. Thanks for allowing me to participate in the care of this patient.   Signed By: Monroe Dsouza NP     6/22/2018  10:02 AM

## 2018-06-22 NOTE — PROGRESS NOTES
Problem: Self Care Deficits Care Plan (Adult)  Goal: *Acute Goals and Plan of Care (Insert Text)  Occupational Therapy Goals  Initiated 6/22/2018  1. Patient will perform grooming and bathing standing at sink with modified independence within 7 day(s). 2.  Patient will perform lower body dressing including gathering clothing with modified independence within 7 day(s). 3.  Patient will perform simple home management with modified independence within 7 day(s). 4.  Patient will perform toilet transfers with modified independence within 7 day(s). 5.  Patient will perform all aspects of toileting with modified independence within 7 day(s). 6.  Patient will participate in upper extremity therapeutic exercise/activities with independence for 10 minutes within 7 day(s). 7.  Patient will utilize energy conservation techniques during functional activities with verbal and visual cues within 7 day(s). Occupational Therapy EVALUATION  Patient: Howie Puente (24 y.o. 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 He currently requires up to [address] for LE ADLs and toileting and min A for functional mobility without use of an assistive devise. Pt resides with his wife whom has dementia and he cares for. Based on above pt currently requires 24/7 assist for safety and HH. If he does not have this available then rehab is recommended at discharge. Patient will benefit from skilled intervention to address the above impairments.   Patients rehabilitation potential is considered to be Guarded  Factors which may influence rehabilitation potential include:   []                None noted  [] Mental ability/status  []                Medical condition  [x]                Home/family situation and support systems  []                Safety awareness  []                Pain tolerance/management  []                Other:      PLAN :  Recommendations and Planned Interventions:  [x]                  Self Care Training                  [x]           Therapeutic Activities  [x]                  Functional Mobility Training    []           Cognitive Retraining  [x]                  Therapeutic Exercises           [x]           Endurance Activities  [x]                  Balance Training                   [x]           Neuromuscular Re-Education  []                  Visual/Perceptual Training     [x]      Home Safety Training  [x]                  Patient Education                 [x]           Family Training/Education  []                  Other (comment):    Frequency/Duration: Patient will be followed by occupational therapy 5 times a week to address goals. Discharge Recommendations: Home Health vs Inpatient Rehab  Further Equipment Recommendations for Discharge: TBD     SUBJECTIVE:   Patient stated My balance has been off for a while.     OBJECTIVE DATA SUMMARY:   HISTORY:   Past Medical History:   Diagnosis Date    BPH (benign prostatic hyperplasia)     Diabetes ([facility]     HTN (hypertension)     Hyperlipidemia     Thyroid disease     Hypo thyroid    Type II or unspecified type diabetes mellitus without mention of complication, uncontrolled      Past Surgical History:   Procedure Laterality Date    HX ORTHOPAEDIC  L shoulder repair    HX ORTHOPAEDIC      Left shoulder       Prior Level of Function/Environment/Context: Pt resides with his wife whom has dementia and he cares for. Drives.   Daughter lives close and assists with wife's care  Home Situation  Home Environment: Private residence  # Steps to Enter: 3  Rails to Enter: Yes  Hand Rails : Bilateral  One/Two Story Residence: One story  Living Alone: No  Support Systems: Spouse/Significant Other/Partner  Patient Expects to be Discharged to[de-identified] Private residence  Current DME Used/Available at Home: None, Shower chair  Tub or Shower Type: Tub/Shower combination  Hand dominance: Right    EXAMINATION OF PERFORMANCE DEFICITS:  Cognitive/Behavioral Status:  Neurologic State: Alert; Appropriate for age  Orientation Level: Oriented X4  Cognition: Appropriate decision making; Appropriate for age attention/concentration; Appropriate safety awareness; Follows commands  Perception: Appears intact  Perseveration: No perseveration noted  Safety/Judgement: Awareness of environment; Fall prevention; Insight into deficits    Hearing: Auditory  Auditory Impairment: None    Vision/Perceptual:    Tracking: Requires cues, head turns, or add eye shifts to track    Saccades: Additional eye shifts occurred during testing    Convergence: Normal (within 6 inches from nose)    Visual Fields: Difficulty detecting stimulus in left lower quadrant; Difficulty detecting stimulus  in left lateral quadrant; Difficulty detecting stimulus in right lower quadrant  Diplopia: No    Acuity: Able to read clock/calendar on wall without difficulty; Able to read employee name badge without difficulty    Corrective Lenses: Glasses    Range of Motion:  AROM: Generally decreased, functional                         Strength:  Strength: Within functional limits                Coordination:  Coordination: Within functional limits  Fine Motor Skills-Upper: Left Intact; Right Intact    Gross Motor Skills-Upper: Left Intact; Right Intact    Tone & Sensation:  Tone: Normal  Sensation: Intact                      Balance:  Sitting: Intact  Standing: Impaired  Standing - Static:  (fair to good)  Standing - Dynamic : Fair    Functional Mobility and Transfers for ADLs:  Bed Mobility:  Supine to Sit: Supervision  Scooting: Supervision    Transfers:  Sit to Stand: Contact guard assistance; Additional time  Functional Transfers  Toilet Transfer : Additional time;Minimum assistance (without AD)      ADL Assessment:  Feeding: Independent    Oral Facial Hygiene/Grooming: Contact guard assistance; Additional time (standing at sink)    Bathing: Contact guard assistance; Additional time (CGA for safety with standing)    Upper Body Dressing: Independent    Lower Body Dressing: Contact guard assistance; Additional time (CGA for safety with standing)    Toileting: Contact guard assistance; Additional time (CGA for safety with standing)                ADL Intervention and task modifications:  Patient was educated on the benefits of maintaining activity tolerance, functional mobility, and independence with self care tasks during acute stay. Encouraged patient to be out of bed for all meals, perform daily ADLs (as approved by RN/MD regarding bathing etc), performing functional mobility to/from bathroom, and increasing time OOB daily with assist. Patient educated about the importance of maintaining activity tolerance to ensure safe return home and to baseline. Patient verbalized understanding of education. Cognitive Retraining  Safety/Judgement: Awareness of environment; Fall prevention; Insight into deficits      Functional Measure:   Fugl-Nagy Assessment of Motor Recovery after Stroke:  BUEs    Reflex Activity  Flexors/Biceps/Fingers: Can be elicited  Extensors/Triceps: Can be elicited  Reflex Subtotal: 4    Volitional Movement Within Synergies  Shoulder Retraction: Full  Shoulder Elevation: Full  Shoulder Abduction (90 degrees): Full  Shoulder External Rotation: Full  Elbow Flexion: Full  Forearm Supination: Full  Shoulder Adduction/Internal Rotation: Full  Elbow Extension: Full  Forearm Pronation: Full  Subtotal: 18    Volitional Movement Mixing Synergies  Hand to Lumbar Spine: Full  Shoulder Flexion (0-90 degrees): Full  Pronation-Supination: Full  Subtotal: 6    Volitional Movement With Little or No Synergy  Shoulder Abduction (0-90 degrees): Full  Shoulder Flexion ( degrees): Full  Pronation/Supination: Full  Subtotal : 6    Normal Reflex Activity  Biceps, Triceps, Finger Flexors: Full  Subtotal : 2    Upper Extremity Total   Upper Extremity Total: 36    Wrist  Stability at 15 Degree Dorsiflexion: Full  Repeated Dorsiflexion/ Volar Flexion: Full  Stability at 15 Degree Dorsiflexion: Full  Repeated Dorsiflexion/ Volar Flexion: Full  Circumduction: Full  Wrist Total: 10    Hand  Mass Flexion: Full  Mass Extension: Full  Grasp A: Full  Grasp B: Full  Grasp C: Full  Grasp D: Full  Grasp E: Full  Hand Total: 14    Coordination/Speed  Tremor: None  Dysmetria: None  Time: <1s (RUE in 7 sec and LUE in 6 sec)  Coordination/Speed Total : 6    Total A-D  Total A-D (Motor Function): 66/66     Percentage of impairment CH  0% CI  1-19% CJ  20-39% CK  40-59% CL  60-79% CM  80-99% CN  100%   Fugl-Nagy score: 0-66 66 53-65 39-52 26-38 13-25 1-12   0      This is a reliable/valid measure of arm function after a neurological event. It has established value to characterize functional status and for measuring spontaneous and therapy-induced recovery; tests proximal and distal motor functions. Fugl-Nagy Assessment  UE scores recorded between five and 30 days post neurologic event can be used to predict UE recovery at six months post neurologic event. Severe = 0-21 points   Moderately Severe = 22-33 points   Moderate = 34-47 points   Mild = 48-66 points  YULISA Dinh, JAMEE Benjamin, & NAIMA Robbins (1992). Measurement of motor recovery after stroke: Outcome assessment and sample size requirements.  Stroke, 23, pp. 8577-6838.   ------------------------------------------------------------------------------------------------------------------------------------------------------------------  MCID:  Stroke:   Bulmaro De Dios et al, 2001; n = 171; mean age 79 (6) years; assessed within 16 (12) days of stroke, Acute Stroke)  FMA Motor Scores from Admission to Discharge   10 point increase in FMA Upper Extremity = 1.5 change in discharge FIM   10 point increase in FMA Lower Extremity = 1.9 change in discharge FIM  MDC:   Stroke:   Parisa Olguinrles et al, 2008, n = 14, mean age = 59.9 (14.6) years, assessed on average 14 (6.5) months post stroke, Chronic Stroke)   FMA = 5.2 points for the Upper Extremity portion of the assessment     Barthel Index:    Bathin  Bladder: 10  Bowels: 10  Groomin  Dressin  Feeding: 10  Mobility: 10  Stairs: 5  Toilet Use: 5  Transfer (Bed to Chair and Back): 10  Total: 70       Barthel and G-code impairment scale:  Percentage of impairment CH  0% CI  1-19% CJ  20-39% CK  40-59% CL  60-79% CM  80-99% CN  100%   Barthel Score 0-100 100 99-80 79-60 59-40 20-39 1-19   0   Barthel Score 0-20 20 17-19 13-16 9-12 5-8 1-4 0      The Barthel ADL Index: Guidelines  1. The index should be used as a record of what a patient does, not as a record of what a patient could do. 2. The main aim is to establish degree of independence from any help, physical or verbal, however minor and for whatever reason. 3. The need for supervision renders the patient not independent. 4. A patient's performance should be established using the best available evidence. Asking the patient, friends/relatives and nurses are the usual sources, but direct observation and common sense are also important. However direct testing is not needed. 5. Usually the patient's performance over the preceding 24-48 hours is important, but occasionally longer periods will be relevant. 6. Middle categories imply that the patient supplies over 50 per cent of the effort. 7. Use of aids to be independent is allowed. Karolina Welsh., Barthel, D.W. ([id number]). Functional evaluation: the Barthel Index. [address] [facility] (14)2. CELIA Becker Allene Guardian., [place], [address] [place] [address] Ave ().  Measuring the change indisability after inpatient rehabilitation; comparison of the responsiveness of the Barthel Index and Functional [place] Measure. Journal of Neurology, Neurosurgery, and Psychiatry, 66(4), 594-433. [place] Patience, N.J.A, MARQUIS Montague, & José Antonio Amaro M.A. (2004.) Assessment of post-stroke quality of life in cost-effectiveness studies: The usefulness of the Barthel Index and the EuroQoL-5D. Quality of Life Research, 13, 524-74     G codes: In compliance with CMSs Claims Based Outcome Reporting, the following G-code set was chosen for this patient based on their primary functional limitation being treated: The outcome measure chosen to determine the severity of the functional limitation was the Barthel Index with a score of 70/100 which was correlated with the impairment scale. ? Self Care:     - CURRENT STATUS: CJ - 20%-39% impaired, limited or restricted    - GOAL STATUS: CI - 1%-19% impaired, limited or restricted    - D/C STATUS:  ---------------To be determined---------------      Occupational Therapy Evaluation Charge Determination   History Examination Decision-Making   LOW Complexity : Brief history review  MEDIUM Complexity : 3-5 performance deficits relating to physical, cognitive , or psychosocial skils that result in activity limitations and / or participation restrictions LOW Complexity : No comorbidities that affect functional and no verbal or physical assistance needed to complete eval tasks       Based on the above components, the patient evaluation is determined to be of the following complexity level: LOW     Pain:  Pain Scale 1: Numeric (0 - 10)  Pain Intensity 1: 0              Activity Tolerance:   Fair  Please refer to the flowsheet for vital signs taken during this treatment.   After treatment:   [x]  Patient left in no apparent distress sitting up in chair  []  Patient left in no apparent distress in bed  [x]  Call bell left within reach  [x]  Nursing notified  []  Caregiver present  [x]  Bed alarm activated    COMMUNICATION/EDUCATION:   The patients plan of care was discussed with: Registered Nurse. Patient was educated regarding His deficit(s) of impaired vision and balance as this relates to His diagnosis of R occipital CVA. He demonstrated Fair understanding as evidenced by awareness of situation. Patient and/or family was verbally educated on the BE FAST acronym for signs/symptoms of CVA and TIA. Informed patient to refer to the Stroke Binder for further BE FAST information. All questions answered with patient indicating good understanding. [x]    Home safety education was provided and the patient/caregiver indicated understanding. [x]    Patient/family have participated as able in goal setting and plan of care. [x]    Patient/family agree to work toward stated goals and plan of care. []    Patient understands intent and goals of therapy, but is neutral about his/her participation. []    Patient is unable to participate in goal setting and plan of care. This patients plan of care is appropriate for delegation to DAVY.     Thank you for this referral.  Vika Sands OT  Time Calculation: 16 mins

## 2018-06-22 NOTE — PROCEDURES
Ramez Briscoe M.D.  [phone]           2018                EGD Operative Report  Jacqueline Gaffney  :  1940  Nicole Minor Medical Record Number:  [id number]      Indication: Epigastric pain with nausea and vomiting    : Niurka Mondragon MD    Referring Provider:  Raya Parada MD      Anesthesia/Sedation:  MAC anesthesia Propofol    Airway assessment: No airway problems anticipated    Pre-Procedural Exam:      Airway: clear, no airway problems anticipated  Heart: RRR, without gallops or rubs  Lungs: clear bilaterally without wheezes, crackles, or rhonchi  Abdomen: soft, nontender, nondistended, bowel sounds present  Mental Status: awake, alert and oriented to person, place and time       Procedure Details     After infomed consent was obtained for the procedure, with all risks and benefits of procedure explained the patient was taken to the endoscopy suite and placed in the left lateral decubitus position. Following sequential administration of sedation as per above, the endoscope was inserted into the mouth and advanced under direct vision to second portion of the duodenum. A careful inspection was made as the gastroscope was withdrawn, including a retroflexed view of the proximal stomach; findings and interventions are described below. Findings:   Esophagus:Severe LA grade D esophagitis was noted with a necrotic appearing mucosa at the GE junction. The esophagitis starts at 34cms and extends to 38cms from the incisors. Biopsies were obtained for histology   Stomach: normal except for food residue  Duodenum/jejunum: The duodenal bulb showed friable mucosa and a large cratered ulcer. This appears to be causing a partial obstruction. The scope was able to be advanced beyond the bulb    Therapies:  biopsy of esophagus    Specimens: Esophageal biopsies           Complications:   None; patient tolerated the procedure well. EBL:  None. Impression:   Severe erosive esophagitis noted. The GE junction mucosa appeared necrotic. Biopsies were obtained               A large cratered duodenal bulb ulcer noted causing gastric outlet obstruction.                            Food residue was noted in the gastric body    Recommendations:    -Await pathology results  -Start carafate 1gm BID  -Start BID PPI therapy  -Keep him on clears for now  -If continues to have symptoms will need a NJT for enteral nutrition  -Monitor symptoms closely  -Will continue to follow with you    Van Favre, MD

## 2018-06-22 NOTE — PROGRESS NOTES
Consult noted for rehab, I have sent referral to [facility] in [employer].  Thanks NARCISA White

## 2018-06-22 NOTE — PROGRESS NOTES
Problem: Mobility Impaired (Adult and Pediatric)  Goal: *Acute Goals and Plan of Care (Insert Text)  Physical Therapy Goals  Initiated 6/22/2018  1. Patient will move from supine to sit and sit to supine  in bed with independence within 7 day(s). 2.  Patient will transfer from bed to chair and chair to bed with modified independence using the least restrictive device within 7 day(s). 3.  Patient will perform sit to stand with modified independence within 7 day(s). 4.  Patient will ambulate with modified independence for 450 feet with the least restrictive device within 7 day(s). 5.  Patient will ascend/descend 3 stairs with handrail(s) with modified independence within 7 day(s). 6.  Patient will increase Sandoval Balance Assessment score by at least 6 points within 7 days. physical Therapy EVALUATION  Patient: Kristina Ty (50 y.o. male)  Date: 6/22/2018  Primary Diagnosis: CVA (cerebral vascular accident) [facility])  [facility]cere[facility] vascular [facility] ([facility]        Precautions:   Fall, DNR    ASSESSMENT :  Based on the objective data described below, the patient presents with impaired balance, high fall risk, fatigue, decreased endurance, and limited functional mobility on hospital day 1 of admission due to nausea/vomiting, vertigo, and gait impairment x 3 days with work-up positive for acute on chronic R occipital ischemic changes. Pt states independent baseline mobility and high level of function described below. He offers excellent participation with PT and tolerates treatment without complaints. He ambulates with minimal assistance without device this date and demonstrates appropriate insight into deficits. Patient will benefit from skilled intervention to address the above impairments.   Patients rehabilitation potential is considered to be Good  Factors which may influence rehabilitation potential include:   []         None noted  []         Mental ability/status  [x]         Medical condition  [] Home/family situation and support systems  []         Safety awareness  []         Pain tolerance/management  []         Other:      PLAN :  Recommendations and Planned Interventions:  [x]           Bed Mobility Training             []    Neuromuscular Re-Education  [x]           Transfer Training                   []    Orthotic/Prosthetic Training  [x]           Gait Training                         []    Modalities  [x]           Therapeutic Exercises           []    Edema Management/Control  [x]           Therapeutic Activities            [x]    Patient and Family Training/Education  []           Other (comment):    Frequency/Duration: Patient will be followed by physical therapy  6 times a week to address goals. Discharge Recommendations: To Be Determined. Pt requires 24-hour supervision/assist for ambulation at this time. If discharged at current functional level recommend rehab. Depending on length of stay and progress with PT he may be most appropriate for HHPT. Further Equipment Recommendations for Discharge: none at this time     SUBJECTIVE:   Patient stated  I was wiggly at home, re: ambulation during 3 days preceding admission. OBJECTIVE DATA SUMMARY:   HISTORY:    Past Medical History:   Diagnosis Date    BPH (benign prostatic hyperplasia)     Diabetes ([facility]     HTN (hypertension)     Hyperlipidemia     Thyroid disease     Hypo thyroid    Type II or unspecified type diabetes mellitus without mention of complication, uncontrolled      Past Surgical History:   Procedure Laterality Date    HX ORTHOPAEDIC  L shoulder repair    HX ORTHOPAEDIC      Left shoulder   . Pt received supine, agreeable to PT and cleared by RN. Prior Level of Function/Home Situation: independent baseline mobility. Reports cutting grass using riding and push mowers regularly. 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 Reports 1 fall in past year associated with icy parking lot surface.  Sustained L wrist/arm injury which required surgical repair. Personal factors and/or comorbidities impacting plan of care: DM    Home Situation  Home Environment: Private residence  # Steps to Enter: 3  Rails to Enter: Yes  Hand Rails : Bilateral  One/Two Story Residence: One story  Living Alone: No  Support Systems: Spouse/Significant Other/Partner  Patient Expects to be Discharged to[de-identified] Private residence  Current DME Used/Available at Home: None, Shower chair  Tub or Shower Type: Tub/Shower combination    EXAMINATION/PRESENTATION/DECISION MAKING:   Critical Behavior:  Neurologic State: Alert, Appropriate for age  Orientation Level: Oriented X4  Cognition: Appropriate decision making, Appropriate for age attention/concentration, Appropriate safety awareness, Follows commands  Safety/Judgement: Awareness of environment, Fall prevention, Insight into deficits  Hearing: Auditory  Auditory Impairment: None  Skin:  LE exposed skin intact  Edema: none noted LEs. Range Of Motion:  AROM: Generally decreased, functional                       Strength:    Strength: Within functional limits      5/5 throughout BLEs. Tone & Sensation:   Tone: Normal              Sensation: Intact               Coordination:  Coordination: Within functional limits  Vision:   Tracking: Requires cues, head turns, or add eye shifts to track  Saccades: Additional eye shifts occurred during testing  Convergence: Normal (within 6 inches from nose)  Visual Fields: Difficulty detecting stimulus in left lower quadrant; Difficulty detecting stimulus  in left lateral quadrant; Difficulty detecting stimulus in right lower quadrant  Diplopia: No  Acuity: Able to read clock/calendar on wall without difficulty; Able to read employee name badge without difficulty  Corrective Lenses: Glasses  Functional Mobility:  Bed Mobility:     Supine to Sit: Supervision     Scooting: Supervision  Transfers:  Sit to Stand: Contact guard assistance; Additional time; does not require UE use  Stand to Sit: Contact guard assistance; Additional time                       Balance:   Sitting: Intact  Standing: Impaired  Standing - Static:  (fair to good)  Standing - Dynamic : Fair  Ambulation/Gait Training:  Distance (ft): 80 Feet (ft)  Assistive Device: Gait belt  Ambulation - Level of Assistance: Minimal assistance        Gait Abnormalities: Decreased step clearance; Path deviations        Base of Support: Widened     Speed/Florinda: Pace decreased (<100 feet/min)                     Pt with inconsistent step placement, mild path deviations, guarded posture, no overt loss of balance. During brief trial using rolling walker for safety assessment pt ambulates with SBA to CGA. Functional Measure:  Sandoval Balance Test:    Sitting to Standin  Standing Unsupported: 3  Sitting with Back Unsupported: 4  Standing to Sittin  Transfers: 1  Standing Unsupported with Eyes Closed: 3  Standing Unsupported with Feet Together: 1  Reach Forward with Outstretched Arm: 4   Object: 3  Turn to Look Over Shoulders: 3  Turn 360 Degrees: 0  Alternate Foot on Step/Stool: 0  Standing Unsupported One Foot in Front: 1  Stand on One Le  Total: 27         56=Maximum possible score;   0-20=High fall risk  21-40=Moderate fall risk   41-56=Low fall risk     Sandoval Balance Test and G-code impairment scale:  Percentage of Impairment CH    0%   CI    1-19% CJ    20-39% CK    40-59% CL    60-79% CM    80-99% CN     100%   Sandoval   Score 0-56 56 45-55 34-44 23-33 12-22 1-11 0           G codes: In compliance with CMSs Claims Based Outcome Reporting, the following G-code set was chosen for this patient based on their primary functional limitation being treated: The outcome measure chosen to determine the severity of the functional limitation was the Mustafa with a score of 27/56 which was correlated with the impairment scale.     ? Mobility - Walking and Moving Around:     - CURRENT STATUS: CK - 40%-59% impaired, limited or restricted    - GOAL STATUS: CJ - 20%-39% impaired, limited or restricted    - D/C STATUS:  ---------------To be determined---------------      Physical Therapy Evaluation Charge Determination   History Examination Presentation Decision-Making   MEDIUM  Complexity : 1-2 comorbidities / personal factors will impact the outcome/ POC  HIGH Complexity : 4+ Standardized tests and measures addressing body structure, function, activity limitation and / or participation in recreation  LOW Complexity : Stable, uncomplicated  Other outcome measures marshall  MEDIUM      Based on the above components, the patient evaluation is determined to be of the following complexity level: LOW     Pain:  Pain Scale 1: Numeric (0 - 10)  Pain Intensity 1: 0              Activity Tolerance:   Negative orthostatics. He denies presyncopal symptoms, vertigo symptoms, and nausea throughout encounter. Please refer to the flowsheet for vital signs taken during this treatment. After treatment:   [x]         Patient left in no apparent distress sitting up in chair  []         Patient left in no apparent distress in bed  [x]         Call bell left within reach  [x]         Nursing notified  [x]         Caregiver present (PCT)  [x]         Chair alarm activated    Pt educated regarding safety precautions including proper footwear and need to contact staff for assistance with all mobility. Pt verbalizes understanding. Patient and/or family was verbally educated on the BE FAST acronym for signs/symptoms of CVA and TIA. Also reviewed importance of contacting 911 for fastest treatment available if symptoms arise. Pt verbalizes understanding and all questions answered to reported satisfaction. COMMUNICATION/EDUCATION:   The patients plan of care was discussed with: Registered Nurse, SLP. [x]         Fall prevention education was provided and the patient/caregiver indicated understanding.   [x] Patient/family have participated as able in goal setting and plan of care. [x]         Patient/family agree to work toward stated goals and plan of care. []         Patient understands intent and goals of therapy, but is neutral about his/her participation. []         Patient is unable to participate in goal setting and plan of care.     Thank you for this referral.  Marly Ashraf, PT, DPT   Time Calculation: 40 mins

## 2018-06-22 NOTE — PROGRESS NOTES
[facility]  1940  [id number]    Situation:  Verbal report received from:   Tierra Li RN  Procedure: Procedure(s):  ESOPHAGOGASTRODUODENOSCOPY (EGD)  ESOPHAGOGASTRODUODENAL (EGD) BIOPSY    Background:    Preoperative diagnosis: nausea  vomiting  Postoperative diagnosis: * No post-op diagnosis entered *    :  Dr. Raf Regan  Assistant(s): Endoscopy RN-1: Susanna Hernandez RN  Endoscopy RN-2: Beti Dalton RN    Specimens:   ID Type Source Tests Collected by Time Destination   1 : esophageal biopsy [id number] Sandeep Reza MD 6/22/2018 1259 Pathology     H. Pylori  no    Assessment:  Intra-procedure medications     Anesthesia gave intra-procedure sedation and medications, see anesthesia flow sheet yes    Intravenous fluids: NS@ KVO     Vital signs stable   yes    Abdominal assessment: round and soft   yes    Recommendation:  Discharge patient per MD order  inpatient.   Return to floor  yes  Family or Friend   daughter  Permission to share finding with family or friend yes

## 2018-06-22 NOTE — PROGRESS NOTES
[facility]  [address] [facility], [address] [place] [address]  [phone]      Medical Progress Note      NAME: Jim Lin   :  1940  MRM:  [id number]    Date/Time: 2018  10:25 AM       Assessment and Plan:     Acute on chronic CVA (cerebral vascular accident) ([facility] (2018). CT head with acute occipital stroke. He denies any visual changes but notes severe gait imbalance. Continue ASA. LDL is 35 but TG remain elevated, okay with no statin as it is below goal for CVA guidelines, remain on tricor for TG elevation. PT/OT/SLP consults. Awaiting neurology evaluation, defer to them whether we pursue MRI head/neck. BL carotid doppler normal. Awaiting echocardiogram. Suspect he will need inpatient rehab, CM consult sent.     Epigastric abdominal pain (2018)/ Nausea and vomiting (2018). Possible due to gastritis. Continue PPI. GI consult pending. U/S of the abdomen without acute finding. Symptomatic treatment with anti emetics.     Acute on chronic renal failure ([facility] (2018). This is likely secondary to persistent vomiting and poor PO intake. Improving. Continue IVFs and monitor.      Hypothyroidism due to acquired atrophy of thyroid (2015). Continue synthroid. TSH pending     Essential hypertension (2015). Continue home atenolol. Hold lisinopril due to worsening renal function      Type 2 diabetes mellitus with diabetic nephropathy ([facility] (2015). 9.6% A1C. BG not terribly elevated, likely due to residual PO med effect from JOSELYN. Resume long-acting at 50%. Continue SSI.     Hyperlipidemia (2018). LDL is in 30s, TG remains elevated but total cholesterol nml. Continue tricor     BPH (benign prostatic hyperplasia) (2018). On terazosin      Obesity (2015). Would benefit from weight loss     Leukocytosis. Afebrile. UA negative. No ABx for now. Subjective:     Chief Complaint:  Patient seen and examined. Chart reviewed.  Patient reports that he feels okay, no change in vision, remains dizzy. ROS:  (bold if positive, if negative)              Objective:     Last 24hrs VS reviewed since prior progress note.  Most recent are:    Visit Vitals    /65 (BP 1 Location: Left arm, BP Patient Position: Supine)    Pulse 61    Temp 97.9 °F (36.6 °C)    Resp 16    Ht 5' 11\" (1.803 m)    Wt 91.5 kg (201 lb 11.5 oz)    SpO2 97%    BMI 28.13 kg/m2     SpO2 Readings from Last 6 Encounters:   06/22/18 97%   01/19/18 100%   01/17/18 97%   06/27/16 95%   03/18/16 98%   11/18/15 98%          Intake/Output Summary (Last 24 hours) at 06/22/18 1025  Last data filed at 06/22/18 9472   Gross per 24 hour   Intake            692.5 ml   Output              300 ml   Net            392.5 ml        Physical Exam:    Gen:  Well-developed, well-nourished, in no acute distress  HEENT:  Pink conjunctivae, PERRL, hearing intact to voice, moist mucous membranes  Neck:  Supple, without masses, thyroid non-tender  Resp:  No accessory muscle use, clear breath sounds without wheezes rales or rhonchi  Card:  No murmurs, normal S1, S2 without thrills, bruits or peripheral edema  Abd:  Soft, non-tender, non-distended, normoactive bowel sounds are present, no palpable organomegaly and no detectable hernias  Lymph:  No cervical or inguinal adenopathy  Musc:  No cyanosis or clubbing  Skin:  No rashes or ulcers, skin turgor is good  Neuro:  Cranial nerves are grossly intact, no focal motor weakness, follows commands appropriately  Psych:  Good insight, oriented to person, place and time, alert    Telemetry reviewed:   normal sinus rhythm  __________________________________________________________________  Medications Reviewed: (see below)  Medications:     Current Facility-Administered Medications   Medication Dose Route Frequency    sodium chloride (NS) flush 5-10 mL  5-10 mL IntraVENous Q8H    sodium chloride (NS) flush 5-10 mL  5-10 mL IntraVENous PRN    acetaminophen (TYLENOL) tablet 650 mg  650 mg Oral Q4H PRN    Or    acetaminophen (TYLENOL) solution 650 mg  650 mg Per NG tube Q4H PRN    Or    acetaminophen (TYLENOL) suppository 650 mg  650 mg Rectal Q4H PRN    0.9% sodium chloride infusion  75 mL/hr IntraVENous CONTINUOUS    aspirin (ASPIRIN) tablet 325 mg  325 mg Oral DAILY    pantoprazole (PROTONIX) injection 40 mg  40 mg IntraVENous DAILY    ondansetron (ZOFRAN) injection 4 mg  4 mg IntraVENous Q4H PRN    heparin (porcine) injection 5,000 Units  5,000 Units SubCUTAneous Q8H    insulin lispro (HUMALOG) injection   SubCUTAneous Q6H    glucose chewable tablet 16 g  4 Tab Oral PRN    dextrose (D50W) injection syrg 12.5-25 g  12.5-25 g IntraVENous PRN    glucagon (GLUCAGEN) injection 1 mg  1 mg IntraMUSCular PRN    prochlorperazine (COMPAZINE) injection 10 mg  10 mg IntraVENous Q6H PRN    escitalopram oxalate (LEXAPRO) tablet 10 mg  10 mg Oral DAILY    levothyroxine (SYNTHROID) tablet 75 mcg  75 mcg Oral ACB    terazosin (HYTRIN) capsule 2 mg  2 mg Oral QHS    fenofibrate nanocrystallized (TRICOR) tablet 145 mg  145 mg Oral DAILY    atenolol (TENORMIN) tablet 50 mg  50 mg Oral DAILY        Lab Data Reviewed: (see below)  Lab Review:     Recent Labs      06/22/18   0133  06/21/18   1421   WBC  14.8*  16.0*   HGB  12.3  13.8   HCT  38.2  40.8   PLT  300  360     Recent Labs      06/22/18   0133  06/21/18   1421   NA  139  137   K  3.6  3.6   CL  105  97   CO2  28  28   GLU  196*  369*   BUN  41*  39*   CREA  1.83*  2.20*   CA  8.2*  8.9   MG   --   1.8   ALB   --   3.6   TBILI   --   0.6   SGOT   --   18   ALT   --   23     Lab Results   Component Value Date/Time    Glucose (POC) 184 (H) 06/22/2018 05:53 AM    Glucose (POC) 209 (H) 06/22/2018 12:57 AM    Glucose (POC) 269 (H) 06/21/2018 08:36 PM    Glucose (POC) 104 (H) 01/19/2018 06:36 PM    Glucose (POC) 69 01/19/2018 06:14 PM     No results for input(s): PH, PCO2, PO2, HCO3, FIO2 in the last 72 hours.   No results for input(s): INR in the last 72 hours. No lab exists for component: INREXT  All Micro Results     Procedure Component Value Units Date/Time    URINE CULTURE HOLD SAMPLE [[id number]] Collected:  06/21/18 1600    Order Status:  Completed Specimen:  Urine from Serum Updated:  06/21/18 1602     Urine culture hold         URINE ON HOLD IN MICROBIOLOGY DEPT FOR 3 DAYS. IF UNPRESERVED URINE IS SUBMITTED, IT CANNOT BE USED FOR ADDITIONAL TESTING AFTER 24 HRS, RECOLLECTION WILL BE REQUIRED. I have reviewed notes of prior 24hr.     Other pertinent lab: None    Total time spent with patient: 35 min                  Care Plan discussed with: Patient and Care Manager    Discussed:  Care Plan and D/C Planning    Prophylaxis:  Hep SQ    Disposition:  SAH/Rehab           ___________________________________________________    Attending Physician: Luis Alberto Damico DO

## 2018-06-22 NOTE — ANESTHESIA PREPROCEDURE EVALUATION
Anesthetic History     PONV          Review of Systems / Medical History  Patient summary reviewed and pertinent labs reviewed    Pulmonary  Within defined limits                 Neuro/Psych       CVA       Cardiovascular    Hypertension              Exercise tolerance: <4 METS     GI/Hepatic/Renal  Within defined limits              Endo/Other    Diabetes  Hypothyroidism       Other Findings              Physical Exam    Airway  Mallampati: I  TM Distance: 4 - 6 cm  Neck ROM: normal range of motion   Mouth opening: Normal     Cardiovascular    Rhythm: regular  Rate: normal         Dental    Dentition: Edentulous     Pulmonary  Breath sounds clear to auscultation               Abdominal  GI exam deferred       Other Findings            Anesthetic Plan    ASA: 3  Anesthesia type: MAC            Anesthetic plan and risks discussed with: Patient

## 2018-06-22 NOTE — PROGRESS NOTES
Problem: Dysphagia (Adult)  Goal: *Acute Goals and Plan of Care (Insert Text)  Swallowing goals initiated 6-22-18:  1)  Tolerate mech soft, thins without s/s aspiraton by 6-25-18  2) family to bring in teeth for eating by 6-23-18  Speech LAnguage Pathology bedside swallow evaluation  Patient: Diann Harrington (60 y.o. male)  Date: 6/22/2018  Primary Diagnosis: CVA (cerebral vascular accident) ([address]  CVA (cerebral vascular accident) ([address]  nausea vomiting  Procedure(s) (LRB):  ESOPHAGOGASTRODUODENOSCOPY (EGD) (N/A)  ESOPHAGOGASTRODUODENAL (EGD) BIOPSY (N/A) Day of Surgery   Precautions: aspiration  Fall, DNR    ASSESSMENT :  Based on the objective data described below, the patient presents with functional swallow. However, he does not have his dentures in hospital, so chewing of solids (once diet advanced ) may be reduced. Also he has severe erosive esophagitis with necrotic mucosa at EGD. Also food residue in stomach, after no PO in the last 24 hours (so ? Gastric emptying). 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 PMH: DM, HT< HLF< hypothyrod, L shoulder repair, +h/o tobacco.  .    Patient will benefit from skilled intervention to address the above impairments. Patients rehabilitation potential is considered to be Good  Factors which may influence rehabilitation potential include:   []            None noted  [x]            Mental ability/status  [x]            Medical condition  []            Home/family situation and support systems  []            Safety awareness  []            Pain tolerance/management  []            Other:      PLAN :  Recommendations and Planned Interventions:  ADVANCE NO FURTHER THAN MECHANICAL SOFT DUE TO EDENTULOUS STATUS (NOT HIS NORM) AND CVA. Frequency/Duration: Patient will be followed by speech-language pathology 1 time a week to address goals. Discharge Recommendations:  To Be Determined     SUBJECTIVE:   Patient stated Janece Juanito told me I have an ulcer. OBJECTIVE:     Past Medical History:   Diagnosis Date    BPH (benign prostatic hyperplasia)     Diabetes ([address]     HTN (hypertension)     Hyperlipidemia     Thyroid disease     Hypo thyroid    Type II or unspecified type diabetes mellitus without mention of complication, uncontrolled      Past Surgical History:   Procedure Laterality Date    HX ORTHOPAEDIC  L shoulder repair    HX ORTHOPAEDIC      Left shoulder     Prior Level of Function/Home Situation:   Home Situation  Home Environment: Private residence  # Steps to Enter: 3  Rails to Enter: Yes  Hand Rails : Bilateral  One/Two Story Residence: One story  Living Alone: No  Support Systems: Spouse/Significant Other/Partner  Patient Expects to be Discharged to[de-identified] Private residence  Current DME Used/Available at Home: None, Shower chair  Tub or Shower Type: Tub/Shower combination  Diet prior to admission: regular, thins  Current Diet:  Clear liquids. Cognitive and Communication Status:  Neurologic State: Alert  Orientation Level: Oriented X4  Cognition: Appropriate decision making  Perception: Appears intact  Perseveration: No perseveration noted  Safety/Judgement: Awareness of environment  Oral Assessment:  Oral Assessment  Labial: No impairment  Dentition: Edentulous (has dentures at home that he wears)  Oral Hygiene: WFL  Lingual: No impairment  Velum: No impairment  Mandible: No impairment  P.O. Trials:  Patient Position: upright in bed  Vocal quality prior to P.O.: No impairment  Consistency Presented:  Thin liquid;Mechanical soft  How Presented: Self-fed/presented;Spoon;Straw;Cup/gulp   ORAL PHASE:   Bolus Acceptance: No impairment  Bolus Formation/Control:  (he may have impaired chew due to edentulous status, which is not the norm for this patient     Propulsion: No impairment  Oral Residue: None   PHARYNGEAL PHASE:   Initiation of Swallow: No impairment  Laryngeal Elevation: Functional  Aspiration Signs/Symptoms: None  Pharyngeal Phase Characteristics: No impairment, issues, or problems       risk for choking and aspiration only due to R basal ganglion CVA and edentulous status. He may have difficulty chewing meats and bread               NOMS:   The NOMS functional outcome measure was used to quantify this patient's level of swallowing impairment. Based on the NOMS, the patient was determined to be at level 6 for swallow function     G Codes: In compliance with CMSs Claims Based Outcome Reporting, the following G-code set was chosen for this patient based the use of the NOMS functional outcome to quantify this patient's level of swallowing impairment. Using the NOMS, the patient was determined to be at level 6 for swallow function which correlates with the CI= 1-19% level of severity. Based on the objective assessment provided within this note, the current, goal, and discharge g-codes are as follows:    Swallow  Swallowing:   Swallow Current Status CI= 1-19%   Swallow Goal Status CI= 1-19%      NOMS Swallowing Levels:  Level 1 (CN): NPO  Level 2 (CM): NPO but takes consistency in therapy  Level 3 (CL): Takes less than 50% of nutrition p.o. and continues with nonoral feedings; and/or safe with mod cues; and/or max diet restriction  Level 4 (CK): Safe swallow but needs mod cues; and/or mod diet restriction; and/or still requires some nonoral feeding/supplements  Level 5 (CJ): Safe swallow with min diet restriction; and/or needs min cues  Level 6 (CI): Independent with p.o.; rare cues; usually self cues; may need to avoid some foods or needs extra time  Level 7 ([address] [place] [address]): Independent for all p.o.  JOAN. (2003). National Outcomes Measurement System (NOMS): Adult Speech-Language Pathology User's Guide.        Pain:  Pain Scale 1: Numeric (0 - 10)  Pain Intensity 1: 0     After treatment:   []            Patient left in no apparent distress sitting up in chair  [] Patient left in no apparent distress in bed  []            Call bell left within reach  []            Nursing notified  []            Caregiver present  []            Bed alarm activated    COMMUNICATION/EDUCATION:   The patients plan of care including recommendations, planned interventions, and recommended diet changes were discussed with: Registered Nurse. Patient was educated regarding His deficit(s) of dysphagia  as this relates to His diagnosis of CVA, erosive esophagitis. He demonstrated Good understanding as evidenced by discussion. .  []            Posted safety precautions in patient's room. [x]            Patient/family have participated as able in goal setting and plan of care. [x]            Patient/family agree to work toward stated goals and plan of care. []            Patient understands intent and goals of therapy, but is neutral about his/her participation. []            Patient is unable to participate in goal setting and plan of care.     Thank you for this referral.  Geoffery Apley, SLP  Time Calculation: 15 mins

## 2018-06-22 NOTE — CONSULTS
[address]  MR#: [id number]  : 1940  ACCOUNT #: [de-identified]   DATE OF SERVICE: 2018    REQUESTING PHYSICIAN:  Dr. Yuri Petersen:  Vertigo and stroke. HISTORY OF PRESENT ILLNESS:  The patient is a 75-year-old gentleman who presented to the emergency room yesterday with complaints of vertigo, nausea, vomiting and difficulty with balance. The patient reports that his symptoms started about 3 weeks ago and he was diagnosed with vertigo at that time. Subsequently, he has continued to have difficulty with nausea and vomiting everything he eats and has lost over 8 pounds in the last several weeks. Given that his symptoms were not improving, he came to the emergency room for further evaluation. In the emergency room, he did have a CT scan that showed acute versus chronic ischemic changes in the right occipital lobe and right basal ganglia and he was admitted for further workup. When I came to see the patient, he reports that he continues to have difficulty with his vision and balance overall. He denies any previous history of stroke, but he does have stroke risk factors of hypertension, hyperlipidemia and diabetes. PAST MEDICAL HISTORY:  BPH, diabetes, hypertension, hyperlipidemia and thyroid disease. SOCIAL HISTORY:  The patient is , does not smoke, does not drink. FAMILY HISTORY:  Diabetes and cancer. ALLERGIES:  NO KNOWN DRUG ALLERGIES. CURRENT MEDICATIONS:  Lantus, Tylenol, aspirin 325, Protonix, Zofran, Humalog, heparin, Compazine, Lexapro, Synthroid, terazosin, Tricor and atenolol. REVIEW OF SYSTEMS:  CONSTITUTIONAL:  Admits to recent weight loss. ENT:  Denies hearing loss or ringing in ears. EYES:  Admits to vision changes, with a loss in vision of the left visual field. CARDIOVASCULAR:  Denies chest pain.   RESPIRATORY:  Denies shortness of breath, cough or wheezing. GASTROINTESTINAL:  Admits to nausea and vomiting. MUSCULOSKELETAL:  Denies joint pain. INTEGUMENT:  Denies rash. NEUROLOGIC:  Per HPI. PSYCHIATRIC:  Denies anxiety or depression. CLINICAL DATA REVIEW:  IMAGING:  CT of the head with acute on chronic changes in the right occipital lobe and right basal ganglia. I personally reviewed these images in the PACS system and this is my impression. Carotids show less than 50% stenosis bilaterally. TTE pending. Telemetry showed normal sinus rhythm. LABORATORY DATA:  Show a UA with 1+ bacteria. A CBC with an elevated white count of 16.0. Metabolic panel with an elevated glucose of 369 and elevated creatinine of 2.20. Magnesium 1.8. Lipid profile with an LDL of 35.2. Hemoglobin A1c of 9.6. EKG is normal sinus rhythm. PHYSICAL EXAMINATION:  VITAL SIGNS:  Blood pressure 101/58, pulse 60, temperature 98.3, respirations 16, oxygen saturation 95%. GENERAL:  The patient is alert and cooperative, in no acute distress. HEAD:  Normocephalic, without obvious abnormality. EYES:  Conjunctivae clear. Pupils equally round and reactive to light. Extraocular movements are intact with no nystagmus seen. Visual fields with a left homonymous hemianopsia. No papilledema. LUNGS:  Nonlabored breathing. HEART:  Regular rate and rhythm. ABDOMEN:  Soft, nondistended. EXTREMITIES:  Normal, atraumatic. No cyanosis or edema. Pulses 2+ and symmetric. SKIN:  Color, texture and turgor normal.  NEUROLOGIC:    GENERAL:  Attention normal.  Language, naming, repetition and fluency are normal.  MEMORY:  Intact to recent and remote memory. CRANIAL NERVES II-XII:  Visual field as above. Pupils equally round and reactive to light. Extraocular movements intact. Face is symmetric. Tongue and palate are midline. MOTOR:  Normal bulk and tone. No tremor. Strength appears to be 5/5 in all 4 extremities.   SENSORY:  Intact to light touch and pinprick x4.  COORDINATION:  Finger-to-nose with some dysmetria on the right, otherwise intact. GAIT:  Deferred. Reflexes 2+ throughout. IMPRESSION:  This is a 75-year-old gentleman admitted with 3 weeks of nausea, vomiting and imbalance. He does have a right occipital infarct on CT, but this looks old to me. We will pursue an MRI of the brain for further evaluation. He does have stroke risk factors of hypertension, hyperlipidemia and diabetes. RECOMMENDATIONS:    1. MRI of the brain, MRA of the head are pending, and carotids show less than 50% stenosis. 2.  TTE. 3.  Continue telemetry. 4.  Good blood pressure control. 5.  Stroke labs, hemoglobin A1c, TSH and lipid panel as above. 6.  Agree with increased dose of aspirin to 325 mg daily. The patient was prior taking 81.  7.  Continue good blood sugar control. 8.  No need for statin as LDL was at goal.  9.  ST, OT, PT eval ordered and will assess the patient for rehab, which I suspect he will be a candidate for inpatient. 10.  Discussed personal risk factors for stroke, including hypertension, hyperlipidemia and diabetes. 11.  Discussed signs and symptoms of stroke and when to alert 911. 12.  Venous thromboembolism prophylaxis with heparin. Thank you very much for this consultation. We will follow up with above studies and give further recommendations as indicated. Dr. Jenniffer Apgar will be taking over this patient's care tomorrow.       MD KAYA Rosario / PRIYA  D: 2018 12:37     T: 2018 13:13  JOB #: [id number]

## 2018-06-22 NOTE — PROGRESS NOTES
Nutrition Assessment:    RECOMMENDATIONS/INTERVENTION(S):   1. Continue clear liquid diet- MD/SLP to manage diet advancement. Would recommend consistent CHO. 2. Ensure Clear while on clear liquid diet for protein intake. 3. Monitor diet tolerance, GI status and intake. ASSESSMENT:   6/22: Pt screened for potential dysphagia risk. Admitted for CVA, intractable N/V, acute on chronic renal failure. PMHx for HTN and DM. A1c 9.6, , 284, 96. Patient reports decreased appetite starting 3 weeks ago with vertigo/dizziness with nothing to eat the past  3 days PTA due to nausea/Vomiting, -225 lbs, now 201lbs. Pt reports losing the weight in the past 3 weeks (8% body wt). GI following, endoscopy this afternoon with findings of esophagitis and duodenal ulcers. Pt started on clear liquid diet, and had about 25% of the tray. Seemed to be tolerating those few bites. Pt open to trying ONS. States it not his favorite but is ok with drinking it to get better.      Meets Criteria for Acute Malnutrition   [x] Severe Malnutrition, as evidenced by:   [] Moderate muscle wasting, loss of subcutaneous fat   [x] Nutritional intake of <50% of recommended intake for >5 days   [x] Weight loss of >1-2% in 1 week, >5% in 1 month, >7.5% in 3 months, or >10% in 6 months   [] Moderate-severe edema         SUBJECTIVE/OBJECTIVE:   Diet Order: Clear liquids  % Eaten:  Patient Vitals for the past 72 hrs:   % Diet Eaten   06/22/18 0910 0 %     Pertinent Medications: [x] Reviewed-NS IVF, lantus, lispro   Past Medical History:   Diagnosis Date    BPH (benign prostatic hyperplasia)     [facility] ([facility]     HTN (hypertension)     Hyperlipidemia     Thyroid disease     Hypo thyroid    Type II or unspecified type diabetes mellitus without mention of complication, uncontrolled        Chemistries:  Lab Results   Component Value Date/Time    Sodium 139 06/22/2018 01:33 AM    Potassium 3.6 06/22/2018 01:33 AM    Chloride 105 06/22/2018 01:33 AM    CO2 28 06/22/2018 01:33 AM    Anion gap 6 06/22/2018 01:33 AM    Glucose 196 (H) 06/22/2018 01:33 AM    BUN 41 (H) 06/22/2018 01:33 AM    Creatinine 1.83 (H) 06/22/2018 01:33 AM    BUN/Creatinine ratio 22 (H) 06/22/2018 01:33 AM    GFR est AA 43 (L) 06/22/2018 01:33 AM    GFR est non-AA 36 (L) 06/22/2018 01:33 AM    Calcium 8.2 (L) 06/22/2018 01:33 AM    AST (SGOT) 18 06/21/2018 02:21 PM    Alk. phosphatase 68 06/21/2018 02:21 PM    Protein, total 7.4 06/21/2018 02:21 PM    Albumin 3.6 06/21/2018 02:21 PM    Globulin 3.8 06/21/2018 02:21 PM    A-G Ratio 0.9 (L) 06/21/2018 02:21 PM    ALT (SGPT) 23 06/21/2018 02:21 PM      Anthropometrics: Height: 5' 11\" (180.3 cm) Weight: 91.2 kg (201 lb 1 oz)    IBW (%IBW): 80.9 kg (178 lb 5.6 oz) (112.73 %) UBW (%UBW): 99.3 kg (219 lb) (91.81 %)    BMI: Body mass index is 28.04 kg/(m^2). This BMI is indicative of:   [] Underweight    [] Normal    [x] Overweight    []  Obesity    []  Extreme Obesity (BMI>40)  Estimated Nutrition Needs (Based on): 2143 Kcals/day (BMR 1649 x 1.3 AF) , 91 g (1g/kg actual bw) Protein  Carbohydrate: At Least 130 g/day  Fluids: 2150 mL/day (1 ml/kcal)    Last BM: 6/21   []Active     []Hyperactive  []Hypoactive       [] Absent   BS- not charted  Skin:    [x] Intact   [] Incision  [] Breakdown   [] DTI   [] Tears/Excoriation/Abrasion  []Edema [] Other:    Wt Readings from Last 30 Encounters:   06/22/18 91.2 kg (201 lb 1 oz)   01/19/18 100 kg (220 lb 7.4 oz)   01/17/18 103.4 kg (228 lb)   06/27/16 103.3 kg (227 lb 12.8 oz)   03/18/16 103.4 kg (228 lb)   11/18/15 103.1 kg (227 lb 3.2 oz)   08/03/15 106.1 kg (233 lb 12.8 oz)   02/16/15 102.5 kg (226 lb)   11/18/14 100.5 kg (221 lb 9.6 oz)   09/29/14 98.5 kg (217 lb 3.2 oz)   08/11/14 103.9 kg (229 lb)   11/23/13 104.8 kg (231 lb)      NUTRITION DIAGNOSES:   Problem:  Inadequate oral intake     Etiology: related to Conditions related to dx: CVA, esophagitis, duodenal ulcer     Signs/Symptoms: as evidenced by Pt reported decreased intake and 8% wt loss in 3 weeks      NUTRITION INTERVENTIONS: General/healthful diet, commercial nutrition supplements.                      GOAL:   Patient to consume 50% of meals and ONS with good tolerance in the next 1-3 days    Cultural, Caodaism, or Ethnic Dietary Needs: None     EDUCATION & DISCHARGE NEEDS:    [x] None Identified at this time   [] Identified and Education Provided/Documented   [] Identified and Pt declined/was not appropriate      [x] Interdisciplinary Care Plan Reviewed/Documented    [x] Discharge Needs: TBD   [] No Nutrition Related Discharge Needs    NUTRITION RISK:   Pt Is At Nutrition Risk  [x]     No Nutrition Risk Identified  []       PT SEEN FOR:    []  MD Consult: []Calorie Count      []Diabetic Diet Education        []Diet Education     []Electrolyte Management     []General Nutrition Management and Supplements     []Management of Tube Feeding     []TPN Recommendations    []  RN Referral:  []MST score >=2     []Enteral/Parenteral Nutrition PTA     []Pregnant: Gestational DM or Multigestation                 [] Pressure Ulcer    []  Low BMI      []  Length of Stay       [x] Dysphagia screen         [] Ventilator  []  Follow-up     Previous Recommendations:   [] Implemented          [] Not Implemented          [x] Not Applicable    Previous Goal:   [] Met              [] Progressing Towards Goal              [] Not Progressing Towards Goal   [x] Not Applicable            Liang Penaloza, [address] [place] [address]  Pager [phone]  Office [phone]

## 2018-06-23 LAB
ANION GAP SERPL CALC-SCNC: 10 MMOL/L (ref 5–15)
BASOPHILS # BLD: 0.1 K/UL (ref 0–0.1)
BASOPHILS NFR BLD: 1 % (ref 0–1)
BUN SERPL-MCNC: 34 MG/DL (ref 6–20)
BUN/CREAT SERPL: 22 (ref 12–20)
CALCIUM SERPL-MCNC: 8.2 MG/DL (ref 8.5–10.1)
CHLORIDE SERPL-SCNC: 107 MMOL/L (ref 97–108)
CO2 SERPL-SCNC: 24 MMOL/L (ref 21–32)
CREAT SERPL-MCNC: 1.55 MG/DL (ref 0.7–1.3)
DIFFERENTIAL METHOD BLD: ABNORMAL
EOSINOPHIL # BLD: 0.1 K/UL (ref 0–0.4)
EOSINOPHIL NFR BLD: 1 % (ref 0–7)
ERYTHROCYTE [DISTWIDTH] IN BLOOD BY AUTOMATED COUNT: 13.8 % (ref 11.5–14.5)
GLUCOSE BLD STRIP.AUTO-MCNC: 133 MG/DL (ref 65–100)
GLUCOSE BLD STRIP.AUTO-MCNC: 228 MG/DL (ref 65–100)
GLUCOSE BLD STRIP.AUTO-MCNC: 268 MG/DL (ref 65–100)
GLUCOSE BLD STRIP.AUTO-MCNC: 315 MG/DL (ref 65–100)
GLUCOSE BLD STRIP.AUTO-MCNC: 69 MG/DL (ref 65–100)
GLUCOSE BLD STRIP.AUTO-MCNC: 76 MG/DL (ref 65–100)
GLUCOSE BLD STRIP.AUTO-MCNC: 94 MG/DL (ref 65–100)
GLUCOSE BLD STRIP.AUTO-MCNC: 95 MG/DL (ref 65–100)
GLUCOSE SERPL-MCNC: 64 MG/DL (ref 65–100)
HCT VFR BLD AUTO: 36 % (ref 36.6–50.3)
HGB BLD-MCNC: 11.8 G/DL (ref 12.1–17)
IMM GRANULOCYTES # BLD: 0.1 K/UL (ref 0–0.04)
IMM GRANULOCYTES NFR BLD AUTO: 1 % (ref 0–0.5)
LYMPHOCYTES # BLD: 3.6 K/UL (ref 0.8–3.5)
LYMPHOCYTES NFR BLD: 30 % (ref 12–49)
MAGNESIUM SERPL-MCNC: 1.9 MG/DL (ref 1.6–2.4)
MCH RBC QN AUTO: 28.7 PG (ref 26–34)
MCHC RBC AUTO-ENTMCNC: 32.8 G/DL (ref 30–36.5)
MCV RBC AUTO: 87.6 FL (ref 80–99)
MONOCYTES # BLD: 0.9 K/UL (ref 0–1)
MONOCYTES NFR BLD: 7 % (ref 5–13)
NEUTS SEG # BLD: 7.2 K/UL (ref 1.8–8)
NEUTS SEG NFR BLD: 60 % (ref 32–75)
NRBC # BLD: 0 K/UL (ref 0–0.01)
NRBC BLD-RTO: 0 PER 100 WBC
PHOSPHATE SERPL-MCNC: 2.8 MG/DL (ref 2.6–4.7)
PLATELET # BLD AUTO: 258 K/UL (ref 150–400)
PMV BLD AUTO: 10.5 FL (ref 8.9–12.9)
POTASSIUM SERPL-SCNC: 3.4 MMOL/L (ref 3.5–5.1)
RBC # BLD AUTO: 4.11 M/UL (ref 4.1–5.7)
SERVICE CMNT-IMP: ABNORMAL
SERVICE CMNT-IMP: NORMAL
SODIUM SERPL-SCNC: 141 MMOL/L (ref 136–145)
WBC # BLD AUTO: 11.9 K/UL (ref 4.1–11.1)

## 2018-06-23 PROCEDURE — 74011250636 HC RX REV CODE- 250/636: Performed by: INTERNAL MEDICINE

## 2018-06-23 PROCEDURE — 74011250637 HC RX REV CODE- 250/637: Performed by: INTERNAL MEDICINE

## 2018-06-23 PROCEDURE — 84100 ASSAY OF PHOSPHORUS: CPT | Performed by: INTERNAL MEDICINE

## 2018-06-23 PROCEDURE — C9113 INJ PANTOPRAZOLE SODIUM, VIA: HCPCS | Performed by: INTERNAL MEDICINE

## 2018-06-23 PROCEDURE — 82962 GLUCOSE BLOOD TEST: CPT

## 2018-06-23 PROCEDURE — 74011636637 HC RX REV CODE- 636/637: Performed by: INTERNAL MEDICINE

## 2018-06-23 PROCEDURE — 65660000000 HC RM CCU STEPDOWN

## 2018-06-23 PROCEDURE — 36415 COLL VENOUS BLD VENIPUNCTURE: CPT | Performed by: INTERNAL MEDICINE

## 2018-06-23 PROCEDURE — 80048 BASIC METABOLIC PNL TOTAL CA: CPT | Performed by: INTERNAL MEDICINE

## 2018-06-23 PROCEDURE — 85025 COMPLETE CBC W/AUTO DIFF WBC: CPT | Performed by: INTERNAL MEDICINE

## 2018-06-23 PROCEDURE — 83735 ASSAY OF MAGNESIUM: CPT | Performed by: INTERNAL MEDICINE

## 2018-06-23 RX ORDER — INSULIN GLARGINE 100 [IU]/ML
20 INJECTION, SOLUTION SUBCUTANEOUS
Status: DISCONTINUED | OUTPATIENT
Start: 2018-06-23 | End: 2018-06-26 | Stop reason: HOSPADM

## 2018-06-23 RX ORDER — MISOPROSTOL 200 UG/1
200 TABLET ORAL 2 TIMES DAILY
Status: DISCONTINUED | OUTPATIENT
Start: 2018-06-23 | End: 2018-06-26 | Stop reason: HOSPADM

## 2018-06-23 RX ADMIN — ESCITALOPRAM OXALATE 10 MG: 10 TABLET ORAL at 21:31

## 2018-06-23 RX ADMIN — SUCRALFATE 1 G: 1 SUSPENSION ORAL at 09:31

## 2018-06-23 RX ADMIN — MISOPROSTOL 200 MCG: 200 TABLET ORAL at 12:22

## 2018-06-23 RX ADMIN — FENOFIBRATE 145 MG: 145 TABLET ORAL at 21:31

## 2018-06-23 RX ADMIN — Medication 10 ML: at 05:54

## 2018-06-23 RX ADMIN — ATENOLOL 50 MG: 50 TABLET ORAL at 21:31

## 2018-06-23 RX ADMIN — INSULIN LISPRO 5 UNITS: 100 INJECTION, SOLUTION INTRAVENOUS; SUBCUTANEOUS at 12:22

## 2018-06-23 RX ADMIN — PANTOPRAZOLE SODIUM 40 MG: 40 INJECTION, POWDER, FOR SOLUTION INTRAVENOUS at 21:31

## 2018-06-23 RX ADMIN — HEPARIN SODIUM 5000 UNITS: 5000 INJECTION, SOLUTION INTRAVENOUS; SUBCUTANEOUS at 15:06

## 2018-06-23 RX ADMIN — SUCRALFATE 1 G: 1 SUSPENSION ORAL at 18:23

## 2018-06-23 RX ADMIN — ASPIRIN 81 MG 81 MG: 81 TABLET ORAL at 09:31

## 2018-06-23 RX ADMIN — Medication 10 ML: at 21:38

## 2018-06-23 RX ADMIN — HEPARIN SODIUM 5000 UNITS: 5000 INJECTION, SOLUTION INTRAVENOUS; SUBCUTANEOUS at 21:37

## 2018-06-23 RX ADMIN — TERAZOSIN HYDROCHLORIDE ANHYDROUS 2 MG: 1 CAPSULE ORAL at 21:37

## 2018-06-23 RX ADMIN — PANTOPRAZOLE SODIUM 40 MG: 40 INJECTION, POWDER, FOR SOLUTION INTRAVENOUS at 09:32

## 2018-06-23 RX ADMIN — MISOPROSTOL 200 MCG: 200 TABLET ORAL at 18:23

## 2018-06-23 RX ADMIN — HEPARIN SODIUM 5000 UNITS: 5000 INJECTION, SOLUTION INTRAVENOUS; SUBCUTANEOUS at 05:54

## 2018-06-23 RX ADMIN — LEVOTHYROXINE SODIUM 75 MCG: 75 TABLET ORAL at 07:06

## 2018-06-23 RX ADMIN — INSULIN GLARGINE 20 UNITS: 100 INJECTION, SOLUTION SUBCUTANEOUS at 21:32

## 2018-06-23 RX ADMIN — Medication 10 ML: at 15:06

## 2018-06-23 NOTE — PROGRESS NOTES
[facility] [place] [place]   [place] [address]   [address]   [phone] CSEXBL    Fax         Stroke right occipital    Case discussed with Dr. Hunter Saxena and chart, films reviewed  He has subacute right occipital infarct which would explain his clinical findings  Right PCA occluded  Anterior circulation open  Carotids good  Echo with nml EF and no shunt  LDL 35  On ASA 81 mg (was on this prior to admission) and GI following for possible melena with endoscopy demonstrating larger ulcer and friable tissue and cytotec added     Exam  Visit Vitals    /74 (BP 1 Location: Left arm, BP Patient Position: Sitting)    Pulse (!) 58    Temp 97.7 °F (36.5 °C)    Resp 18    Ht 5' 11\" (1.803 m)    Wt 94 kg (207 lb 3.7 oz)    SpO2 99%    BMI 28.9 kg/m2     Very pleasant gentleman sitting in edge of bed eating his lunch  Awake, alert and oriented  Normal speech and language  Left H/H  No drift  No ataxia    Imp/plan  Subacute right occipital CVA  Given this occurred while on 81mg ASA would like to change to either 325mg or change to Plavix however there is the issue of the gastric ulcer  Perhaps Plavix would be a better option in that it would not be expected to contribute to the gastritis but may have increased bleeding risk  Will leave at 81mg for now  If no objection from GI, would change from ASA to Plavix 75mg daily  Cont therapies, stroke teaching etc per protocol  Discussed with him my thoughts re Plavix, mech of action and potential risks as well as wanting to get input from GI regarding ASA vs Plavix. He had good questions re: his sight and I drew some pictures on whit eboard to illustrate the CVA, visual pathways and explain his vision is not likely to return but the brain will eventually ignore the blind spot.     Koki Mendes MD

## 2018-06-23 NOTE — PROGRESS NOTES
Bedside and Verbal shift change report given to Kira Cash (oncoming nurse) by Sophie Polanco RN  (offgoing nurse). Report included the following information SBAR, Kardex and Recent Results. 0725: pt resting quietly in bed. Introduced self as primary nurse      Bedside and Verbal shift change report given to Kady (oncoming nurse) by [address] (offgoing nurse). Report included the following information SBAR, Kardex, Intake/Output and Accordion.

## 2018-06-23 NOTE — PROGRESS NOTES
Problem: Falls - Risk of  Goal: *Absence of Falls  Document Vandana Fall Risk and appropriate interventions in the flowsheet. Outcome: Progressing Towards Goal  Fall Risk Interventions:  Mobility Interventions: Patient to call before getting OOB         Medication Interventions: Patient to call before getting OOB    Elimination Interventions: Call light in reach    History of Falls Interventions: Door open when patient unattended        1900- Bedside and Verbal shift change report given to [address] RN  (oncoming nurse) by Destinee Alfaro  (offgoing nurse). Report included the following information SBAR, Kardex and Recent Results. .    2100- TO MRI DETP VIA WC. Destini Casey 2200- BACK TO FLOOR VIA . Destini Casey 0539- BS 59. Destini Casey OJ given     2960- BS- 76. Estel Ask given. Destini Casey 0610-BS - 94.    0700- Bedside and Verbal shift change report given to    RN  (oncoming nurse) by [address] RN  (offgoing nurse). Report included the following information SBAR, Kardex and Recent Results. .. Destini Casey

## 2018-06-23 NOTE — PROGRESS NOTES
Gastrointestinal Progress Note    6/23/2018    Admit Date: 6/21/2018    Subjective:  I feel great     New Complaints Today:  Yes    Pain: Patient complains of abdominal pain no. Bowel Movements: Dark and loose    Bleeding:  possible melena    Current Facility-Administered Medications   Medication Dose Route Frequency    insulin glargine (LANTUS) injection 20 Units  20 Units SubCUTAneous QHS    pantoprazole (PROTONIX) injection 40 mg  40 mg IntraVENous Q12H    sucralfate (CARAFATE) 100 mg/mL oral suspension 1 g  1 g Oral BID    aspirin chewable tablet 81 mg  81 mg Oral DAILY    sodium chloride (NS) flush 5-10 mL  5-10 mL IntraVENous Q8H    sodium chloride (NS) flush 5-10 mL  5-10 mL IntraVENous PRN    acetaminophen (TYLENOL) tablet 650 mg  650 mg Oral Q4H PRN    Or    acetaminophen (TYLENOL) solution 650 mg  650 mg Per NG tube Q4H PRN    Or    acetaminophen (TYLENOL) suppository 650 mg  650 mg Rectal Q4H PRN    ondansetron (ZOFRAN) injection 4 mg  4 mg IntraVENous Q4H PRN    heparin (porcine) injection 5,000 Units  5,000 Units SubCUTAneous Q8H    insulin lispro (HUMALOG) injection   SubCUTAneous Q6H    glucose chewable tablet 16 g  4 Tab Oral PRN    dextrose (D50W) injection syrg 12.5-25 g  12.5-25 g IntraVENous PRN    glucagon (GLUCAGEN) injection 1 mg  1 mg IntraMUSCular PRN    prochlorperazine (COMPAZINE) injection 10 mg  10 mg IntraVENous Q6H PRN    escitalopram oxalate (LEXAPRO) tablet 10 mg  10 mg Oral DAILY    levothyroxine (SYNTHROID) tablet 75 mcg  75 mcg Oral ACB    terazosin (HYTRIN) capsule 2 mg  2 mg Oral QHS    fenofibrate nanocrystallized (TRICOR) tablet 145 mg  145 mg Oral DAILY    atenolol (TENORMIN) tablet 50 mg  50 mg Oral DAILY        Objective:     Blood pressure 152/73, pulse (!) 56, temperature 97.6 °F (36.4 °C), resp. rate 16, height 5' 11\" (1.803 m), weight 94 kg (207 lb 3.7 oz), SpO2 97 %.     06/23 0701 - 06/23 1900  In: 240 [P.O.:240]  Out: -     06/21 1901 - 06/23 0700  In: 1642.5 [P.O.:1110;  I.V.:532.5]  Out: 1125 [Urine:1125]    EXAM:  GENERAL: alert, cooperative, no distress, HEART: regular rate and rhythm, LUNGS: chest clear, no wheezing, rales, normal symmetric air entry, ABDOMEN:  Bowel sounds are normal, liver is not enlarged, spleen is not enlarged and EXTREMITY: no edema      Data Review    Recent Results (from the past 24 hour(s))   GLUCOSE, POC    Collection Time: 06/22/18 12:07 PM   Result Value Ref Range    Glucose (POC) 210 (H) 65 - 100 mg/dL    Performed by Georgina Cortez (PCT)    GLUCOSE, POC    Collection Time: 06/22/18  5:37 PM   Result Value Ref Range    Glucose (POC) 259 (H) 65 - 100 mg/dL    Performed by Yuly Hunter*    GLUCOSE, POC    Collection Time: 06/22/18 10:01 PM   Result Value Ref Range    Glucose (POC) 152 (H) 65 - 100 mg/dL    Performed by Zulma Arango (PCT)    METABOLIC PANEL, BASIC    Collection Time: 06/23/18  5:01 AM   Result Value Ref Range    Sodium 141 136 - 145 mmol/L    Potassium 3.4 (L) 3.5 - 5.1 mmol/L    Chloride 107 97 - 108 mmol/L    CO2 24 21 - 32 mmol/L    Anion gap 10 5 - 15 mmol/L    Glucose 64 (L) 65 - 100 mg/dL    BUN 34 (H) 6 - 20 MG/DL    Creatinine 1.55 (H) 0.70 - 1.30 MG/DL    BUN/Creatinine ratio 22 (H) 12 - 20      GFR est AA 53 (L) >60 ml/min/1.73m2    GFR est non-AA 43 (L) >60 ml/min/1.73m2    Calcium 8.2 (L) 8.5 - 10.1 MG/DL   CBC WITH AUTOMATED DIFF    Collection Time: 06/23/18  5:01 AM   Result Value Ref Range    WBC 11.9 (H) 4.1 - 11.1 K/uL    RBC 4.11 4.10 - 5.70 M/uL    HGB 11.8 (L) 12.1 - 17.0 g/dL    HCT 36.0 (L) 36.6 - 50.3 %    MCV 87.6 80.0 - 99.0 FL    MCH 28.7 26.0 - 34.0 PG    MCHC 32.8 30.0 - 36.5 g/dL    RDW 13.8 11.5 - 14.5 %    PLATELET 628 528 - 802 K/uL    MPV 10.5 8.9 - 12.9 FL    NRBC 0.0 0  WBC    ABSOLUTE NRBC 0.00 0.00 - 0.01 K/uL    NEUTROPHILS 60 32 - 75 %    LYMPHOCYTES 30 12 - 49 %    MONOCYTES 7 5 - 13 %    EOSINOPHILS 1 0 - 7 %    BASOPHILS 1 0 - 1 %    IMMATURE GRANULOCYTES 1 (H) 0.0 - 0.5 %    ABS. NEUTROPHILS 7.2 1.8 - 8.0 K/UL    ABS. LYMPHOCYTES 3.6 (H) 0.8 - 3.5 K/UL    ABS. MONOCYTES 0.9 0.0 - 1.0 K/UL    ABS. EOSINOPHILS 0.1 0.0 - 0.4 K/UL    ABS. BASOPHILS 0.1 0.0 - 0.1 K/UL    ABS. IMM. GRANS. 0.1 (H) 0.00 - 0.04 K/UL    DF AUTOMATED     MAGNESIUM    Collection Time: 06/23/18  5:01 AM   Result Value Ref Range    Magnesium 1.9 1.6 - 2.4 mg/dL   PHOSPHORUS    Collection Time: 06/23/18  5:01 AM   Result Value Ref Range    Phosphorus 2.8 2.6 - 4.7 MG/DL   GLUCOSE, POC    Collection Time: 06/23/18  5:39 AM   Result Value Ref Range    Glucose (POC) 69 65 - 100 mg/dL    Performed by [employer], POC    Collection Time: 06/23/18  5:58 AM   Result Value Ref Range    Glucose (POC) 76 65 - 100 mg/dL    Performed by [employer], POC    Collection Time: 06/23/18  6:13 AM   Result Value Ref Range    Glucose (POC) 94 65 - 100 mg/dL    Performed by [employer], POC    Collection Time: 06/23/18  7:27 AM   Result Value Ref Range    Glucose (POC) 228 (H) 65 - 100 mg/dL    Performed by Christophe Rubinstein        Assessment:     Active Problems:    Obesity (2/16/2015)      Hypothyroidism due to acquired atrophy of thyroid (11/18/2015)      Essential hypertension (11/18/2015)      Type 2 diabetes mellitus with diabetic nephropathy ([facility] (11/18/2015)      CVA (cerebral vascular accident) ([facility] (6/21/2018)      Hyperlipidemia (6/21/2018)      BPH (benign prostatic hyperplasia) (6/21/2018)      Nausea and vomiting (6/21/2018)      Epigastric abdominal pain (6/21/2018)      Acute on chronic renal failure ([facility] (6/21/2018)      UTI (urinary tract infection) (6/21/2018)        Plan:     1.   Add low-dose misoprostol as he is still on 81 mg asa daily

## 2018-06-23 NOTE — PROGRESS NOTES
Matthieu Bishop tana [place] [address]  [address] [facility], [address] [place] [address]  [phone]      Medical Progress Note      NAME: Jonathan Church   :  1940  MRM:  [id number]    Date/Time: 2018  10:25 AM       Assessment and Plan:     Acute on chronic CVA (cerebral vascular accident) ([facility] (2018). CT head with acute occipital stroke. MRI/MRA head/neck show noted stroke and posterior circulation AS vascular disease. Continue ASA but at 81 mg. LDL is 35 but TG remain elevated, okay with no statin as it is below goal for CVA guidelines, remain on tricor for TG elevation. PT/OT/SLP. Appreciate neurology evaluation. TTE shows good LVEF and no shunt. CM consult for [facility].    Epigastric abdominal pain (2018)/ Nausea and vomiting (2018). EGD reviewed with Dr. Jeni Barr. GROSSLY abnormal with duodenal bulb ulceration, severe/necrotic esophagitis. Continue BID PPI. Carafate. Clears and advancing diet per GI. Appreciate GI consult and biopsy/pathology pending.       Acute on chronic renal failure ([facility] (2018). This is likely secondary to persistent vomiting and poor PO intake. Improving. Continue IVFs and monitor.      Hypothyroidism due to acquired atrophy of thyroid (2015). Continue synthroid. TSH wnl     Essential hypertension (2015). Continue home atenolol. Hold lisinopril due to worsening renal function, may reintroduce tomorrow if SCr improves further.     Type 2 diabetes mellitus with diabetic nephropathy ([facility] (2015). 9.6% A1C. BG low overnight, likely due to residual PO med effect and insulin with JOSELYN. Reduce lantus to 20 units. Continue SSI.     Hyperlipidemia (2018). LDL is in 30s, TG remains elevated but total cholesterol nml. Continue tricor, no indication for statin     BPH (benign prostatic hyperplasia) (2018). On terazosin      Obesity (2015). Would benefit from weight loss     Leukocytosis. Afebrile. UA negative. No ABx for now. Subjective:     Chief Complaint:  Patient seen and examined. Chart reviewed. Patient reports that he feels okay, no change in vision, remains dizzy. Has some globus sensation with swallowing. ROS:  (bold if positive, if negative)              Objective:     Last 24hrs VS reviewed since prior progress note.  Most recent are:    Visit Vitals    /73 (BP 1 Location: Left arm, BP Patient Position: At rest;Supine)    Pulse (!) 56    Temp 97.6 °F (36.4 °C)    Resp 16    Ht 5' 11\" (1.803 m)    Wt 94 kg (207 lb 3.7 oz)    SpO2 97%    BMI 28.9 kg/m2     SpO2 Readings from Last 6 Encounters:   06/23/18 97%   01/19/18 100%   01/17/18 97%   06/27/16 95%   03/18/16 98%   11/18/15 98%    O2 Flow Rate (L/min): 2 l/min       Intake/Output Summary (Last 24 hours) at 06/23/18 1209  Last data filed at 06/23/18 0644   Gross per 24 hour   Intake              950 ml   Output              825 ml   Net              125 ml        Physical Exam:    Gen:  Well-developed, well-nourished, in no acute distress  HEENT:  Pink conjunctivae, PERRL, hearing intact to voice, moist mucous membranes  Neck:  Supple, without masses, thyroid non-tender  Resp:  No accessory muscle use, clear breath sounds without wheezes rales or rhonchi  Card:  No murmurs, normal S1, S2 without thrills, bruits or peripheral edema  Abd:  Soft, non-tender, non-distended, normoactive bowel sounds are present, no palpable organomegaly and no detectable hernias  Lymph:  No cervical or inguinal adenopathy  Musc:  No cyanosis or clubbing  Skin:  No rashes or ulcers, skin turgor is good  Neuro:  Cranial nerves are grossly intact, no focal motor weakness, follows commands appropriately  Psych:  Good insight, oriented to person, place and time, alert    Telemetry reviewed:   normal sinus rhythm  __________________________________________________________________  Medications Reviewed: (see below)  Medications:     Current Facility-Administered Medications Medication Dose Route Frequency    insulin glargine (LANTUS) injection 30 Units  30 Units SubCUTAneous QHS    pantoprazole (PROTONIX) injection 40 mg  40 mg IntraVENous Q12H    sucralfate (CARAFATE) 100 mg/mL oral suspension 1 g  1 g Oral BID    aspirin chewable tablet 81 mg  81 mg Oral DAILY    sodium chloride (NS) flush 5-10 mL  5-10 mL IntraVENous Q8H    sodium chloride (NS) flush 5-10 mL  5-10 mL IntraVENous PRN    acetaminophen (TYLENOL) tablet 650 mg  650 mg Oral Q4H PRN    Or    acetaminophen (TYLENOL) solution 650 mg  650 mg Per NG tube Q4H PRN    Or    acetaminophen (TYLENOL) suppository 650 mg  650 mg Rectal Q4H PRN    ondansetron (ZOFRAN) injection 4 mg  4 mg IntraVENous Q4H PRN    heparin (porcine) injection 5,000 Units  5,000 Units SubCUTAneous Q8H    insulin lispro (HUMALOG) injection   SubCUTAneous Q6H    glucose chewable tablet 16 g  4 Tab Oral PRN    dextrose (D50W) injection syrg 12.5-25 g  12.5-25 g IntraVENous PRN    glucagon (GLUCAGEN) injection 1 mg  1 mg IntraMUSCular PRN    prochlorperazine (COMPAZINE) injection 10 mg  10 mg IntraVENous Q6H PRN    escitalopram oxalate (LEXAPRO) tablet 10 mg  10 mg Oral DAILY    levothyroxine (SYNTHROID) tablet 75 mcg  75 mcg Oral ACB    terazosin (HYTRIN) capsule 2 mg  2 mg Oral QHS    fenofibrate nanocrystallized (TRICOR) tablet 145 mg  145 mg Oral DAILY    atenolol (TENORMIN) tablet 50 mg  50 mg Oral DAILY        Lab Data Reviewed: (see below)  Lab Review:     Recent Labs      06/23/18   0501  06/22/18   0133  06/21/18   1421   WBC  11.9*  14.8*  16.0*   HGB  11.8*  12.3  13.8   HCT  36.0*  38.2  40.8   PLT  258  300  360     Recent Labs      06/23/18   0501  06/22/18   0133  06/21/18   1421   NA  141  139  137   K  3.4*  3.6  3.6   CL  107  105  97   CO2  24  28  28   GLU  64*  196*  369*   BUN  34*  41*  39*   CREA  1.55*  1.83*  2.20*   CA  8.2*  8.2*  8.9   MG  1.9   --   1.8   PHOS  2.8   --    --    ALB   --    --   3.6   TBILI --    --   0.6   SGOT   --    --   18   ALT   --    --   23     Lab Results   Component Value Date/Time    Glucose (POC) 228 (H) 06/23/2018 07:27 AM    Glucose (POC) 94 06/23/2018 06:13 AM    Glucose (POC) 76 06/23/2018 05:58 AM    Glucose (POC) 69 06/23/2018 05:39 AM    Glucose (POC) 152 (H) 06/22/2018 10:01 PM     No results for input(s): PH, PCO2, PO2, HCO3, FIO2 in the last 72 hours. No results for input(s): INR in the last 72 hours. No lab exists for component: Graciela Mend  All Micro Results     Procedure Component Value Units Date/Time    URINE CULTURE HOLD SAMPLE [[id number]] Collected:  06/21/18 1600    Order Status:  Completed Specimen:  Urine from Serum Updated:  06/21/18 1602     Urine culture hold         URINE ON HOLD IN MICROBIOLOGY DEPT FOR 3 DAYS. IF UNPRESERVED URINE IS SUBMITTED, IT CANNOT BE USED FOR ADDITIONAL TESTING AFTER 24 HRS, RECOLLECTION WILL BE REQUIRED. I have reviewed notes of prior 24hr.     Other pertinent lab: None    Total time spent with patient: 35 min                  Care Plan discussed with: Patient, Nursing Staff, Consultant/Specialist and >50% of time spent in counseling and coordination of care    Discussed:  Care Plan and D/C Planning    Prophylaxis:  Hep SQ    Disposition:  SAH/Rehab           ___________________________________________________    Attending Physician: Melina Foley DO

## 2018-06-24 LAB
ANION GAP SERPL CALC-SCNC: 8 MMOL/L (ref 5–15)
BUN SERPL-MCNC: 23 MG/DL (ref 6–20)
BUN/CREAT SERPL: 14 (ref 12–20)
CALCIUM SERPL-MCNC: 8.3 MG/DL (ref 8.5–10.1)
CHLORIDE SERPL-SCNC: 105 MMOL/L (ref 97–108)
CO2 SERPL-SCNC: 27 MMOL/L (ref 21–32)
CREAT SERPL-MCNC: 1.59 MG/DL (ref 0.7–1.3)
ERYTHROCYTE [DISTWIDTH] IN BLOOD BY AUTOMATED COUNT: 13.6 % (ref 11.5–14.5)
GLUCOSE BLD STRIP.AUTO-MCNC: 127 MG/DL (ref 65–100)
GLUCOSE BLD STRIP.AUTO-MCNC: 228 MG/DL (ref 65–100)
GLUCOSE BLD STRIP.AUTO-MCNC: 292 MG/DL (ref 65–100)
GLUCOSE BLD STRIP.AUTO-MCNC: 80 MG/DL (ref 65–100)
GLUCOSE SERPL-MCNC: 106 MG/DL (ref 65–100)
HCT VFR BLD AUTO: 37.5 % (ref 36.6–50.3)
HGB BLD-MCNC: 12.5 G/DL (ref 12.1–17)
MCH RBC QN AUTO: 28.7 PG (ref 26–34)
MCHC RBC AUTO-ENTMCNC: 33.3 G/DL (ref 30–36.5)
MCV RBC AUTO: 86 FL (ref 80–99)
NRBC # BLD: 0 K/UL (ref 0–0.01)
NRBC BLD-RTO: 0 PER 100 WBC
PLATELET # BLD AUTO: 272 K/UL (ref 150–400)
PMV BLD AUTO: 10.2 FL (ref 8.9–12.9)
POTASSIUM SERPL-SCNC: 3.7 MMOL/L (ref 3.5–5.1)
RBC # BLD AUTO: 4.36 M/UL (ref 4.1–5.7)
SERVICE CMNT-IMP: ABNORMAL
SERVICE CMNT-IMP: NORMAL
SODIUM SERPL-SCNC: 140 MMOL/L (ref 136–145)
WBC # BLD AUTO: 7.8 K/UL (ref 4.1–11.1)

## 2018-06-24 PROCEDURE — 97530 THERAPEUTIC ACTIVITIES: CPT

## 2018-06-24 PROCEDURE — 74011250637 HC RX REV CODE- 250/637: Performed by: INTERNAL MEDICINE

## 2018-06-24 PROCEDURE — 97116 GAIT TRAINING THERAPY: CPT

## 2018-06-24 PROCEDURE — 80048 BASIC METABOLIC PNL TOTAL CA: CPT | Performed by: INTERNAL MEDICINE

## 2018-06-24 PROCEDURE — 74011250636 HC RX REV CODE- 250/636: Performed by: INTERNAL MEDICINE

## 2018-06-24 PROCEDURE — C9113 INJ PANTOPRAZOLE SODIUM, VIA: HCPCS | Performed by: INTERNAL MEDICINE

## 2018-06-24 PROCEDURE — 65660000000 HC RM CCU STEPDOWN

## 2018-06-24 PROCEDURE — 85027 COMPLETE CBC AUTOMATED: CPT | Performed by: INTERNAL MEDICINE

## 2018-06-24 PROCEDURE — 82962 GLUCOSE BLOOD TEST: CPT

## 2018-06-24 PROCEDURE — 36415 COLL VENOUS BLD VENIPUNCTURE: CPT | Performed by: INTERNAL MEDICINE

## 2018-06-24 PROCEDURE — 74011636637 HC RX REV CODE- 636/637: Performed by: INTERNAL MEDICINE

## 2018-06-24 RX ORDER — INSULIN LISPRO 100 [IU]/ML
INJECTION, SOLUTION INTRAVENOUS; SUBCUTANEOUS
Status: DISCONTINUED | OUTPATIENT
Start: 2018-06-24 | End: 2018-06-26 | Stop reason: HOSPADM

## 2018-06-24 RX ORDER — LISINOPRIL 20 MG/1
40 TABLET ORAL DAILY
Status: DISCONTINUED | OUTPATIENT
Start: 2018-06-24 | End: 2018-06-26 | Stop reason: HOSPADM

## 2018-06-24 RX ORDER — PANTOPRAZOLE SODIUM 40 MG/1
40 TABLET, DELAYED RELEASE ORAL
Status: DISCONTINUED | OUTPATIENT
Start: 2018-06-24 | End: 2018-06-26 | Stop reason: HOSPADM

## 2018-06-24 RX ORDER — PEPPERMINT OIL
SPIRIT ORAL ONCE
Status: COMPLETED | OUTPATIENT
Start: 2018-06-24 | End: 2018-06-24

## 2018-06-24 RX ADMIN — LISINOPRIL 40 MG: 20 TABLET ORAL at 11:35

## 2018-06-24 RX ADMIN — HEPARIN SODIUM 5000 UNITS: 5000 INJECTION, SOLUTION INTRAVENOUS; SUBCUTANEOUS at 05:31

## 2018-06-24 RX ADMIN — Medication 10 ML: at 21:42

## 2018-06-24 RX ADMIN — INSULIN LISPRO 2 UNITS: 100 INJECTION, SOLUTION INTRAVENOUS; SUBCUTANEOUS at 21:37

## 2018-06-24 RX ADMIN — INSULIN LISPRO 5 UNITS: 100 INJECTION, SOLUTION INTRAVENOUS; SUBCUTANEOUS at 11:35

## 2018-06-24 RX ADMIN — SUCRALFATE 1 G: 1 SUSPENSION ORAL at 17:59

## 2018-06-24 RX ADMIN — TERAZOSIN HYDROCHLORIDE ANHYDROUS 2 MG: 1 CAPSULE ORAL at 21:38

## 2018-06-24 RX ADMIN — ESCITALOPRAM OXALATE 10 MG: 10 TABLET ORAL at 20:40

## 2018-06-24 RX ADMIN — Medication 10 ML: at 14:12

## 2018-06-24 RX ADMIN — ATENOLOL 50 MG: 50 TABLET ORAL at 20:41

## 2018-06-24 RX ADMIN — Medication: at 23:13

## 2018-06-24 RX ADMIN — FENOFIBRATE 145 MG: 145 TABLET ORAL at 20:40

## 2018-06-24 RX ADMIN — ASPIRIN 81 MG 81 MG: 81 TABLET ORAL at 09:07

## 2018-06-24 RX ADMIN — INSULIN GLARGINE 20 UNITS: 100 INJECTION, SOLUTION SUBCUTANEOUS at 21:37

## 2018-06-24 RX ADMIN — MISOPROSTOL 200 MCG: 200 TABLET ORAL at 09:07

## 2018-06-24 RX ADMIN — SUCRALFATE 1 G: 1 SUSPENSION ORAL at 09:07

## 2018-06-24 RX ADMIN — HEPARIN SODIUM 5000 UNITS: 5000 INJECTION, SOLUTION INTRAVENOUS; SUBCUTANEOUS at 14:11

## 2018-06-24 RX ADMIN — MISOPROSTOL 200 MCG: 200 TABLET ORAL at 17:58

## 2018-06-24 RX ADMIN — LEVOTHYROXINE SODIUM 75 MCG: 75 TABLET ORAL at 05:41

## 2018-06-24 RX ADMIN — HEPARIN SODIUM 5000 UNITS: 5000 INJECTION, SOLUTION INTRAVENOUS; SUBCUTANEOUS at 21:38

## 2018-06-24 RX ADMIN — PANTOPRAZOLE SODIUM 40 MG: 40 INJECTION, POWDER, FOR SOLUTION INTRAVENOUS at 09:07

## 2018-06-24 RX ADMIN — Medication 10 ML: at 04:29

## 2018-06-24 RX ADMIN — PANTOPRAZOLE SODIUM 40 MG: 40 TABLET, DELAYED RELEASE ORAL at 17:59

## 2018-06-24 NOTE — PROGRESS NOTES
0715  Bedside and Verbal shift change report given to [address] [place] [address] (oncoming nurse) by Lakshmi Price (offgoing nurse). Report included the following information SBAR, Kardex, Intake/Output, MAR, Accordion and Recent Results. Received patient in bed without any complaints of pain or shortness of breath. Initial assessment done. 1000  Patient walking in the hallway with the PT. Visit Vitals    BP (!) 153/91    Pulse 64    Temp 97.9 °F (36.6 °C)    Resp 18    Ht 5' 11\" (1.803 m)    Wt 95 kg (209 lb 7 oz)    SpO2 99%    BMI 29.21 kg/m2     0315  Bedside and Verbal shift change report given to Denice (oncoming nurse) by Blayne Mak RN (offgoing nurse). Report included the following information SBAR, Kardex, Intake/Output, MAR, Accordion and Recent Results.

## 2018-06-24 NOTE — PROGRESS NOTES
Matthieu Dario [facility] [place] [address]  [address] [facility], [address] [place] [address]  [phone]      Medical Progress Note      NAME: Edna Macario   :  1940  MRM:  [id number]    Date/Time: 2018  10:25 AM       Assessment and Plan:     Acute on chronic CVA (cerebral vascular accident) ([facility] (2018). CT head with acute occipital stroke. MRI/MRA head/neck show noted stroke and posterior circulation AS vascular disease. Continue ASA but at 81 mg. LDL is 35 but TG remain elevated, okay with no statin as it is below goal for CVA guidelines, remain on tricor for TG elevation. PT/OT/SLP. Appreciate neurology evaluation. TTE shows good LVEF and no shunt. CM consult for [facility] v.      Epigastric abdominal pain (2018)/ Nausea and vomiting (2018). EGD reviewed with Dr. Sandy Javed. GROSSLY abnormal with duodenal bulb ulceration, severe/necrotic esophagitis. Continue BID PPI. Carafate. Clears and advancing diet per GI. Appreciate GI consult and biopsy/pathology pending.       Acute on chronic renal failure, stage III likely due to diabetes mellitus ([facility] (2018). This is likely secondary to persistent vomiting and poor PO intake. Improved with IVFs.     Hypothyroidism due to acquired atrophy of thyroid (2015). Continue synthroid. TSH wnl     Essential hypertension (2015). Continue home atenolol. Resume lisniopril as he is at his baseline creatinine     Type 2 diabetes mellitus with diabetic nephropathy ([facility] (2015). 9.6% A1C. Reduced lantus to 20 units due to hypoglycemia, monitor and titrate as needed. Continue SSI.     Hyperlipidemia (2018). LDL is in 30s, TG remains elevated but total cholesterol nml. Continue tricor, no indication for statin     BPH (benign prostatic hyperplasia) (2018). On terazosin      Obesity (2015). Would benefit from weight loss     Leukocytosis. Afebrile. UA negative. No ABx for now.         Subjective:     Chief Complaint:  Patient seen and examined. Chart reviewed. Patient reports that he feels okay, no change in vision, remains dizzy. Tolerating clears    ROS:  (bold if positive, if negative)              Objective:     Last 24hrs VS reviewed since prior progress note.  Most recent are:    Visit Vitals    /79    Pulse 62    Temp 97.7 °F (36.5 °C)    Resp 17    Ht 5' 11\" (1.803 m)    Wt 95 kg (209 lb 7 oz)    SpO2 97%    BMI 29.21 kg/m2     SpO2 Readings from Last 6 Encounters:   06/24/18 97%   01/19/18 100%   01/17/18 97%   06/27/16 95%   03/18/16 98%   11/18/15 98%    O2 Flow Rate (L/min): 2 l/min       Intake/Output Summary (Last 24 hours) at 06/24/18 7703  Last data filed at 06/24/18 0810   Gross per 24 hour   Intake             1740 ml   Output             1900 ml   Net             -160 ml        Physical Exam:    Gen:  Well-developed, well-nourished, in no acute distress  HEENT:  Pink conjunctivae, PERRL, hearing intact to voice, moist mucous membranes  Neck:  Supple, without masses, thyroid non-tender  Resp:  No accessory muscle use, clear breath sounds without wheezes rales or rhonchi  Card:  No murmurs, normal S1, S2 without thrills, bruits or peripheral edema  Abd:  Soft, non-tender, non-distended, normoactive bowel sounds are present, no palpable organomegaly and no detectable hernias  Lymph:  No cervical or inguinal adenopathy  Musc:  No cyanosis or clubbing  Skin:  No rashes or ulcers, skin turgor is good  Neuro:  Cranial nerves are grossly intact, no focal motor weakness, follows commands appropriately  Psych:  Good insight, oriented to person, place and time, alert    Telemetry reviewed:   normal sinus rhythm  __________________________________________________________________  Medications Reviewed: (see below)  Medications:     Current Facility-Administered Medications   Medication Dose Route Frequency    insulin glargine (LANTUS) injection 20 Units  20 Units SubCUTAneous QHS    miSOPROStol (CYTOTEC) tablet 200 mcg  200 mcg Oral BID    pantoprazole (PROTONIX) injection 40 mg  40 mg IntraVENous Q12H    sucralfate (CARAFATE) 100 mg/mL oral suspension 1 g  1 g Oral BID    aspirin chewable tablet 81 mg  81 mg Oral DAILY    sodium chloride (NS) flush 5-10 mL  5-10 mL IntraVENous Q8H    sodium chloride (NS) flush 5-10 mL  5-10 mL IntraVENous PRN    acetaminophen (TYLENOL) tablet 650 mg  650 mg Oral Q4H PRN    Or    acetaminophen (TYLENOL) solution 650 mg  650 mg Per NG tube Q4H PRN    Or    acetaminophen (TYLENOL) suppository 650 mg  650 mg Rectal Q4H PRN    ondansetron (ZOFRAN) injection 4 mg  4 mg IntraVENous Q4H PRN    heparin (porcine) injection 5,000 Units  5,000 Units SubCUTAneous Q8H    insulin lispro (HUMALOG) injection   SubCUTAneous Q6H    glucose chewable tablet 16 g  4 Tab Oral PRN    dextrose (D50W) injection syrg 12.5-25 g  12.5-25 g IntraVENous PRN    glucagon (GLUCAGEN) injection 1 mg  1 mg IntraMUSCular PRN    prochlorperazine (COMPAZINE) injection 10 mg  10 mg IntraVENous Q6H PRN    escitalopram oxalate (LEXAPRO) tablet 10 mg  10 mg Oral DAILY    levothyroxine (SYNTHROID) tablet 75 mcg  75 mcg Oral ACB    terazosin (HYTRIN) capsule 2 mg  2 mg Oral QHS    fenofibrate nanocrystallized (TRICOR) tablet 145 mg  145 mg Oral DAILY    atenolol (TENORMIN) tablet 50 mg  50 mg Oral DAILY        Lab Data Reviewed: (see below)  Lab Review:     Recent Labs      06/24/18   0808  06/23/18   0501  06/22/18   0133   WBC  7.8  11.9*  14.8*   HGB  12.5  11.8*  12.3   HCT  37.5  36.0*  38.2   PLT  272  258  300     Recent Labs      06/24/18   0808  06/23/18   0501  06/22/18   0133  06/21/18   1421   NA  140  141  139  137   K  3.7  3.4*  3.6  3.6   CL  105  107  105  97   CO2  27  24  28  28   GLU  106*  64*  196*  369*   BUN  23*  34*  41*  39*   CREA  1.59*  1.55*  1.83*  2.20*   CA  8.3*  8.2*  8.2*  8.9   MG   --   1.9   --   1.8   PHOS   --   2.8   --    --    ALB   --    --    --   3.6   TBILI   --    -- --   0.6   SGOT   --    --    --   18   ALT   --    --    --   23     Lab Results   Component Value Date/Time    Glucose (POC) 80 06/24/2018 04:36 AM    Glucose (POC) 95 06/23/2018 11:04 PM    Glucose (POC) 133 (H) 06/23/2018 06:12 PM    Glucose (POC) 268 (H) 06/23/2018 12:00 PM    Glucose (POC) 315 (H) 06/23/2018 11:59 AM     No results for input(s): PH, PCO2, PO2, HCO3, FIO2 in the last 72 hours. No results for input(s): INR in the last 72 hours. No lab exists for component: Tomas Bring  All Micro Results     Procedure Component Value Units Date/Time    URINE CULTURE HOLD SAMPLE [[id number]] Collected:  06/21/18 1600    Order Status:  Completed Specimen:  Urine from Serum Updated:  06/21/18 1602     Urine culture hold         URINE ON HOLD IN MICROBIOLOGY DEPT FOR 3 DAYS. IF UNPRESERVED URINE IS SUBMITTED, IT CANNOT BE USED FOR ADDITIONAL TESTING AFTER 24 HRS, RECOLLECTION WILL BE REQUIRED. I have reviewed notes of prior 24hr.     Other pertinent lab: None    Total time spent with patient: 25 min                  Care Plan discussed with: Patient, Nursing Staff, Consultant/Specialist and >50% of time spent in counseling and coordination of care    Discussed:  Care Plan and D/C Planning    Prophylaxis:  Hep SQ    Disposition:  SAH/Rehab           ___________________________________________________    Attending Physician: [address] [place] [address], DO

## 2018-06-24 NOTE — PROGRESS NOTES
Gastrointestinal Progress Note    6/24/2018    Admit Date: 6/21/2018    Subjective:feels okay     Currently he is without pain, nausea, vomiting; there has not been any visible blood loss. Current Facility-Administered Medications   Medication Dose Route Frequency    lisinopril (PRINIVIL, ZESTRIL) tablet 40 mg  40 mg Oral DAILY    insulin lispro (HUMALOG) injection   SubCUTAneous AC&HS    insulin glargine (LANTUS) injection 20 Units  20 Units SubCUTAneous QHS    miSOPROStol (CYTOTEC) tablet 200 mcg  200 mcg Oral BID    pantoprazole (PROTONIX) injection 40 mg  40 mg IntraVENous Q12H    sucralfate (CARAFATE) 100 mg/mL oral suspension 1 g  1 g Oral BID    aspirin chewable tablet 81 mg  81 mg Oral DAILY    sodium chloride (NS) flush 5-10 mL  5-10 mL IntraVENous Q8H    sodium chloride (NS) flush 5-10 mL  5-10 mL IntraVENous PRN    acetaminophen (TYLENOL) tablet 650 mg  650 mg Oral Q4H PRN    Or    acetaminophen (TYLENOL) solution 650 mg  650 mg Per NG tube Q4H PRN    Or    acetaminophen (TYLENOL) suppository 650 mg  650 mg Rectal Q4H PRN    ondansetron (ZOFRAN) injection 4 mg  4 mg IntraVENous Q4H PRN    heparin (porcine) injection 5,000 Units  5,000 Units SubCUTAneous Q8H    glucose chewable tablet 16 g  4 Tab Oral PRN    dextrose (D50W) injection syrg 12.5-25 g  12.5-25 g IntraVENous PRN    glucagon (GLUCAGEN) injection 1 mg  1 mg IntraMUSCular PRN    prochlorperazine (COMPAZINE) injection 10 mg  10 mg IntraVENous Q6H PRN    escitalopram oxalate (LEXAPRO) tablet 10 mg  10 mg Oral DAILY    levothyroxine (SYNTHROID) tablet 75 mcg  75 mcg Oral ACB    terazosin (HYTRIN) capsule 2 mg  2 mg Oral QHS    fenofibrate nanocrystallized (TRICOR) tablet 145 mg  145 mg Oral DAILY    atenolol (TENORMIN) tablet 50 mg  50 mg Oral DAILY        Objective:     Blood pressure (!) 153/91, pulse 64, temperature 97.9 °F (36.6 °C), resp.  rate 18, height 5' 11\" (1.803 m), weight 95 kg (209 lb 7 oz), SpO2 99 %.    06/24 0701 - 06/24 1900  In: -   Out: 600 [Urine:600]    06/22 1901 - 06/24 0700  In: 2930 [P.O.:2930]  Out: 2125 [Urine:2125]    EXAM:  GENERAL: alert, cooperative, no distress, HEART: regular rate and rhythm, LUNGS: chest clear, no wheezing, rales, normal symmetric air entry, ABDOMEN:  Bowel sounds are normal, liver is not enlarged, spleen is not enlarged   Data Review    Recent Results (from the past 24 hour(s))   GLUCOSE, POC    Collection Time: 06/23/18  6:12 PM   Result Value Ref Range    Glucose (POC) 133 (H) 65 - 100 mg/dL    Performed by Viola Mcmanus, POC    Collection Time: 06/23/18 11:04 PM   Result Value Ref Range    Glucose (POC) 95 65 - 100 mg/dL    Performed by Uvaldo Doherty (PCT)    GLUCOSE, POC    Collection Time: 06/24/18  4:36 AM   Result Value Ref Range    Glucose (POC) 80 65 - 100 mg/dL    Performed by Darya Walters    CBC W/O DIFF    Collection Time: 06/24/18  8:08 AM   Result Value Ref Range    WBC 7.8 4.1 - 11.1 K/uL    RBC 4.36 4.10 - 5.70 M/uL    HGB 12.5 12.1 - 17.0 g/dL    HCT 37.5 36.6 - 50.3 %    MCV 86.0 80.0 - 99.0 FL    MCH 28.7 26.0 - 34.0 PG    MCHC 33.3 30.0 - 36.5 g/dL    RDW 13.6 11.5 - 14.5 %    PLATELET 311 404 - 265 K/uL    MPV 10.2 8.9 - 12.9 FL    NRBC 0.0 0  WBC    ABSOLUTE NRBC 0.00 0.00 - 8.60 K/uL   METABOLIC PANEL, BASIC    Collection Time: 06/24/18  8:08 AM   Result Value Ref Range    Sodium 140 136 - 145 mmol/L    Potassium 3.7 3.5 - 5.1 mmol/L    Chloride 105 97 - 108 mmol/L    CO2 27 21 - 32 mmol/L    Anion gap 8 5 - 15 mmol/L    Glucose 106 (H) 65 - 100 mg/dL    BUN 23 (H) 6 - 20 MG/DL    Creatinine 1.59 (H) 0.70 - 1.30 MG/DL    BUN/Creatinine ratio 14 12 - 20      GFR est AA 51 (L) >60 ml/min/1.73m2    GFR est non-AA 42 (L) >60 ml/min/1.73m2    Calcium 8.3 (L) 8.5 - 10.1 MG/DL   GLUCOSE, POC    Collection Time: 06/24/18 11:23 AM   Result Value Ref Range    Glucose (POC) 292 (H) 65 - 100 mg/dL    Performed by Chely Pryor        Assessment: Active Problems:    Obesity (2/16/2015)      Hypothyroidism due to acquired atrophy of thyroid (11/18/2015)      Essential hypertension (11/18/2015)      Type 2 diabetes mellitus with diabetic nephropathy ([facility] (11/18/2015)      CVA (cerebral vascular accident) ([facility] (6/21/2018)      Hyperlipidemia (6/21/2018)      BPH (benign prostatic hyperplasia) (6/21/2018)      Nausea and vomiting (6/21/2018)      Epigastric abdominal pain (6/21/2018)      Acute on chronic renal failure ([facility] (6/21/2018)      UTI (urinary tract infection) (6/21/2018)        Plan:     1. Will change pantoprazole from intravenous to oral  2.   Will advance to a full liquid diet

## 2018-06-24 NOTE — PROGRESS NOTES
Problem: Falls - Risk of  Goal: *Absence of Falls  Document Vandana Fall Risk and appropriate interventions in the flowsheet. Outcome: Progressing Towards Goal  Fall Risk Interventions:  Mobility Interventions: Bed/chair exit alarm, Communicate number of staff needed for ambulation/transfer, OT consult for ADLs, Patient to call before getting OOB, PT Consult for mobility concerns, PT Consult for assist device competence         Medication Interventions: Bed/chair exit alarm, Teach patient to arise slowly, Patient to call before getting OOB    Elimination Interventions: Call light in reach    History of Falls Interventions: Bed/chair exit alarm, Investigate reason for fall (pt slipped on ice this past winter). ..    1900- Bedside and Verbal shift change report given to [address] [place] [address]  (oncoming nurse) by Joseph Feng RN  (offgoing nurse). Report included the following information SBAR, Kardex and Recent Results. [facility] 0700- Bedside and Verbal shift change report given toDebi WEBSTER        RN  (oncoming nurse) by Florinda Moffett RN  (offgoing nurse). Report included the following information SBAR, Kardex and Recent Results. .. .

## 2018-06-24 NOTE — PROGRESS NOTES
Problem: Falls - Risk of  Goal: *Absence of Falls  Document Vandana Fall Risk and appropriate interventions in the flowsheet. Outcome: Progressing Towards Goal  Fall Risk Interventions:  Mobility Interventions: Bed/chair exit alarm, Communicate number of staff needed for ambulation/transfer         Medication Interventions: Patient to call before getting OOB    Elimination Interventions: Bed/chair exit alarm, Call light in reach, Toilet paper/wipes in reach, Toileting schedule/hourly rounds    History of Falls Interventions:  Investigate reason for fall

## 2018-06-24 NOTE — PROGRESS NOTES
1445:  Bedside and Verbal shift change report given to [address] [place] [address] Avenue, RN (oncoming nurse) by Martha Saleh RN (offgoing nurse). Report included the following information SBAR, Kardex, ED Summary, Recent Results and Cardiac Rhythm NSR.     2230:   Patient having frequent BM's and urine output unable to measure during these times. 0720: Bedside and Verbal shift change report given to John Lacy RN (oncoming nurse) by [address] [place] [address] Avenue, RN (offgoing nurse). Report included the following information SBAR, Kardex, ED Summary, MAR, Recent Results and Cardiac Rhythm NSR.

## 2018-06-24 NOTE — PROGRESS NOTES
Problem: Mobility Impaired (Adult and Pediatric)  Goal: *Acute Goals and Plan of Care (Insert Text)  Physical Therapy Goals  Initiated 6/22/2018  1. Patient will move from supine to sit and sit to supine  in bed with independence within 7 day(s). 2.  Patient will transfer from bed to chair and chair to bed with modified independence using the least restrictive device within 7 day(s). 3.  Patient will perform sit to stand with modified independence within 7 day(s). 4.  Patient will ambulate with modified independence for 450 feet with the least restrictive device within 7 day(s). 5.  Patient will ascend/descend 3 stairs with handrail(s) with modified independence within 7 day(s). 6.  Patient will increase Sandoval Balance Assessment score by at least 6 points within 7 days. physical Therapy TREATMENT  Patient: Vy Nam (89 y.o. male)  Date: 6/24/2018  Diagnosis: CVA (cerebral vascular accident) [facility])  CVA (cerebral vascular accident) ([facility]  nausea vomiting <principal problem not specified>  Procedure(s) (LRB):  ESOPHAGOGASTRODUODENOSCOPY (EGD) (N/A)  ESOPHAGOGASTRODUODENAL (EGD) BIOPSY (N/A) 2 Days Post-Op  Precautions: Fall, DNR  Chart, physical therapy assessment, plan of care and goals were reviewed. ASSESSMENT:  Ptn received up ad jessie in bathroom, upon arrival, educated patient to call for assistance before getting up. demonstrates increased postural sway with standing balance. Denies dizziness with positional change or activity. Initially very slow, guarded gait  Requiring Min A for steadyingwith occasional verbal cues to scan environment, secondary to visual deficits on L. Balance/gait improved to CGA. No over LOB. Pt returned to chair, chair alarm in place.      Progression toward goals:  [x]    Improving appropriately and progressing toward goals  []    Improving slowly and progressing toward goals  []    Not making progress toward goals and plan of care will be adjusted     PLAN:  Patient continues to benefit from skilled intervention to address the above impairments. Continue treatment per established plan of care. Discharge Recommendations:  Rehab vs Home Health with 24hr assist.  Further Equipment Recommendations for Discharge:  none     SUBJECTIVE:   Patient stated I am dong ok.     OBJECTIVE DATA SUMMARY:   Critical Behavior:  Neurologic State: Alert  Orientation Level: Oriented to time  Cognition: Appropriate for age attention/concentration  Safety/Judgement: Awareness of environment  Functional Mobility Training:  Bed Mobility:                    Transfers:  Sit to Stand: Contact guard assistance  Stand to Sit: Contact guard assistance                             Balance:  Sitting: Intact  Standing: Impaired  Standing - Static: Fair  Standing - Dynamic : Fair  Ambulation/Gait Training:  Distance (ft): 100 Feet (ft)  Assistive Device: Gait belt  Ambulation - Level of Assistance: Minimal assistance        Gait Abnormalities: Path deviations;Decreased step clearance        Base of Support: Widened     Speed/Florinda: Fluctuations                       Stairs:              Neuro Re-Education:    Therapeutic Exercises:     Pain:  Pain Scale 1: Numeric (0 - 10)  Pain Intensity 1: 0              Activity Tolerance:   Good  Please refer to the flowsheet for vital signs taken during this treatment.   After treatment:   [x]    Patient left in no apparent distress sitting up in chair  []    Patient left in no apparent distress in bed  [x]    Call bell left within reach  [x]    Nursing notified  []    Caregiver present  [x]    Chair alarm activated    COMMUNICATION/COLLABORATION:   The patients plan of care was discussed with: Registered Nurse    Nela Ivey   Time Calculation: 23 mins

## 2018-06-25 LAB
ANION GAP SERPL CALC-SCNC: 8 MMOL/L (ref 5–15)
BASOPHILS # BLD: 0 K/UL (ref 0–0.1)
BASOPHILS NFR BLD: 0 % (ref 0–1)
BUN SERPL-MCNC: 21 MG/DL (ref 6–20)
BUN/CREAT SERPL: 13 (ref 12–20)
CALCIUM SERPL-MCNC: 8.3 MG/DL (ref 8.5–10.1)
CHLORIDE SERPL-SCNC: 104 MMOL/L (ref 97–108)
CO2 SERPL-SCNC: 28 MMOL/L (ref 21–32)
CREAT SERPL-MCNC: 1.67 MG/DL (ref 0.7–1.3)
DIFFERENTIAL METHOD BLD: ABNORMAL
EOSINOPHIL # BLD: 0.2 K/UL (ref 0–0.4)
EOSINOPHIL NFR BLD: 2 % (ref 0–7)
ERYTHROCYTE [DISTWIDTH] IN BLOOD BY AUTOMATED COUNT: 13.5 % (ref 11.5–14.5)
GLUCOSE BLD STRIP.AUTO-MCNC: 154 MG/DL (ref 65–100)
GLUCOSE BLD STRIP.AUTO-MCNC: 190 MG/DL (ref 65–100)
GLUCOSE BLD STRIP.AUTO-MCNC: 239 MG/DL (ref 65–100)
GLUCOSE BLD STRIP.AUTO-MCNC: 92 MG/DL (ref 65–100)
GLUCOSE SERPL-MCNC: 134 MG/DL (ref 65–100)
HCT VFR BLD AUTO: 33.8 % (ref 36.6–50.3)
HGB BLD-MCNC: 11.2 G/DL (ref 12.1–17)
IMM GRANULOCYTES # BLD: 0.1 K/UL (ref 0–0.04)
IMM GRANULOCYTES NFR BLD AUTO: 1 % (ref 0–0.5)
LYMPHOCYTES # BLD: 1.9 K/UL (ref 0.8–3.5)
LYMPHOCYTES NFR BLD: 20 % (ref 12–49)
MAGNESIUM SERPL-MCNC: 1.7 MG/DL (ref 1.6–2.4)
MCH RBC QN AUTO: 28.3 PG (ref 26–34)
MCHC RBC AUTO-ENTMCNC: 33.1 G/DL (ref 30–36.5)
MCV RBC AUTO: 85.4 FL (ref 80–99)
MONOCYTES # BLD: 0.6 K/UL (ref 0–1)
MONOCYTES NFR BLD: 7 % (ref 5–13)
NEUTS SEG # BLD: 6.7 K/UL (ref 1.8–8)
NEUTS SEG NFR BLD: 71 % (ref 32–75)
NRBC # BLD: 0 K/UL (ref 0–0.01)
NRBC BLD-RTO: 0 PER 100 WBC
PHOSPHATE SERPL-MCNC: 2.7 MG/DL (ref 2.6–4.7)
PLATELET # BLD AUTO: 262 K/UL (ref 150–400)
PMV BLD AUTO: 10.4 FL (ref 8.9–12.9)
POTASSIUM SERPL-SCNC: 3.3 MMOL/L (ref 3.5–5.1)
RBC # BLD AUTO: 3.96 M/UL (ref 4.1–5.7)
SERVICE CMNT-IMP: ABNORMAL
SERVICE CMNT-IMP: NORMAL
SODIUM SERPL-SCNC: 140 MMOL/L (ref 136–145)
WBC # BLD AUTO: 9.5 K/UL (ref 4.1–11.1)

## 2018-06-25 PROCEDURE — 65270000029 HC RM PRIVATE

## 2018-06-25 PROCEDURE — 97116 GAIT TRAINING THERAPY: CPT

## 2018-06-25 PROCEDURE — 36415 COLL VENOUS BLD VENIPUNCTURE: CPT | Performed by: INTERNAL MEDICINE

## 2018-06-25 PROCEDURE — 74011250637 HC RX REV CODE- 250/637: Performed by: INTERNAL MEDICINE

## 2018-06-25 PROCEDURE — 74011250636 HC RX REV CODE- 250/636: Performed by: INTERNAL MEDICINE

## 2018-06-25 PROCEDURE — 84100 ASSAY OF PHOSPHORUS: CPT | Performed by: INTERNAL MEDICINE

## 2018-06-25 PROCEDURE — 74011636637 HC RX REV CODE- 636/637: Performed by: INTERNAL MEDICINE

## 2018-06-25 PROCEDURE — 97535 SELF CARE MNGMENT TRAINING: CPT

## 2018-06-25 PROCEDURE — 97112 NEUROMUSCULAR REEDUCATION: CPT

## 2018-06-25 PROCEDURE — 85025 COMPLETE CBC W/AUTO DIFF WBC: CPT | Performed by: INTERNAL MEDICINE

## 2018-06-25 PROCEDURE — 80048 BASIC METABOLIC PNL TOTAL CA: CPT | Performed by: INTERNAL MEDICINE

## 2018-06-25 PROCEDURE — 82962 GLUCOSE BLOOD TEST: CPT

## 2018-06-25 PROCEDURE — 65660000000 HC RM CCU STEPDOWN

## 2018-06-25 PROCEDURE — 83735 ASSAY OF MAGNESIUM: CPT | Performed by: INTERNAL MEDICINE

## 2018-06-25 RX ORDER — PANTOPRAZOLE SODIUM 40 MG/1
40 TABLET, DELAYED RELEASE ORAL
Qty: 60 TAB | Refills: 1 | Status: SHIPPED | OUTPATIENT
Start: 2018-06-25 | End: 2019-07-25

## 2018-06-25 RX ORDER — SUCRALFATE 1 G/10ML
1 SUSPENSION ORAL 2 TIMES DAILY
Qty: 280 ML | Refills: 0 | Status: SHIPPED | OUTPATIENT
Start: 2018-06-25 | End: 2018-07-09

## 2018-06-25 RX ORDER — SODIUM,POTASSIUM PHOSPHATES 280-250MG
2 POWDER IN PACKET (EA) ORAL 2 TIMES DAILY
Status: COMPLETED | OUTPATIENT
Start: 2018-06-25 | End: 2018-06-25

## 2018-06-25 RX ADMIN — SUCRALFATE 1 G: 1 SUSPENSION ORAL at 17:26

## 2018-06-25 RX ADMIN — FENOFIBRATE 145 MG: 145 TABLET ORAL at 22:24

## 2018-06-25 RX ADMIN — LISINOPRIL 40 MG: 20 TABLET ORAL at 09:00

## 2018-06-25 RX ADMIN — HEPARIN SODIUM 5000 UNITS: 5000 INJECTION, SOLUTION INTRAVENOUS; SUBCUTANEOUS at 05:08

## 2018-06-25 RX ADMIN — PANTOPRAZOLE SODIUM 40 MG: 40 TABLET, DELAYED RELEASE ORAL at 06:01

## 2018-06-25 RX ADMIN — Medication 10 ML: at 13:34

## 2018-06-25 RX ADMIN — SUCRALFATE 1 G: 1 SUSPENSION ORAL at 09:01

## 2018-06-25 RX ADMIN — MISOPROSTOL 200 MCG: 200 TABLET ORAL at 09:01

## 2018-06-25 RX ADMIN — ESCITALOPRAM OXALATE 10 MG: 10 TABLET ORAL at 22:24

## 2018-06-25 RX ADMIN — Medication 10 ML: at 22:24

## 2018-06-25 RX ADMIN — MISOPROSTOL 200 MCG: 200 TABLET ORAL at 17:26

## 2018-06-25 RX ADMIN — LEVOTHYROXINE SODIUM 75 MCG: 75 TABLET ORAL at 06:01

## 2018-06-25 RX ADMIN — HEPARIN SODIUM 5000 UNITS: 5000 INJECTION, SOLUTION INTRAVENOUS; SUBCUTANEOUS at 22:24

## 2018-06-25 RX ADMIN — ATENOLOL 50 MG: 50 TABLET ORAL at 22:24

## 2018-06-25 RX ADMIN — POTASSIUM & SODIUM PHOSPHATES POWDER PACK 280-160-250 MG 2 PACKET: 280-160-250 PACK at 09:00

## 2018-06-25 RX ADMIN — INSULIN LISPRO 2 UNITS: 100 INJECTION, SOLUTION INTRAVENOUS; SUBCUTANEOUS at 11:22

## 2018-06-25 RX ADMIN — TERAZOSIN HYDROCHLORIDE ANHYDROUS 2 MG: 1 CAPSULE ORAL at 22:23

## 2018-06-25 RX ADMIN — POTASSIUM & SODIUM PHOSPHATES POWDER PACK 280-160-250 MG 2 PACKET: 280-160-250 PACK at 17:26

## 2018-06-25 RX ADMIN — INSULIN GLARGINE 20 UNITS: 100 INJECTION, SOLUTION SUBCUTANEOUS at 22:24

## 2018-06-25 RX ADMIN — ASPIRIN 81 MG 81 MG: 81 TABLET ORAL at 09:01

## 2018-06-25 RX ADMIN — HEPARIN SODIUM 5000 UNITS: 5000 INJECTION, SOLUTION INTRAVENOUS; SUBCUTANEOUS at 13:33

## 2018-06-25 RX ADMIN — PANTOPRAZOLE SODIUM 40 MG: 40 TABLET, DELAYED RELEASE ORAL at 17:26

## 2018-06-25 RX ADMIN — INSULIN LISPRO 2 UNITS: 100 INJECTION, SOLUTION INTRAVENOUS; SUBCUTANEOUS at 22:24

## 2018-06-25 RX ADMIN — Medication 10 ML: at 05:04

## 2018-06-25 NOTE — DISCHARGE INSTRUCTIONS
Patient Discharge Instructions    Raul Oliveros / [id number] : 1940    Admitted 2018 Discharged: 2018     Primary Diagnoses  Problem List as of 2018  Date Reviewed: 2018          Codes Class Noted - Resolved    CVA (cerebral vascular accident) [facility]) ICD-10-CM: I63.9  ICD-9-CM: 434.91  2018 - Present        Hyperlipidemia ICD-10-CM: E78.5  ICD-9-CM: 272.4  2018 - Present        BPH (benign prostatic hyperplasia) ICD-10-CM: N40.0  ICD-9-CM: 600.00  2018 - Present        Nausea and vomiting ICD-10-CM: R11.2  ICD-9-CM: 787.01  2018 - Present        Epigastric abdominal pain ICD-10-CM: R10.13  ICD-9-CM: 789.06  2018 - Present        Acute on chronic renal failure ([facility] ICD-10-CM: N17.9, N18.9  ICD-9-CM: 584.9, 585.9  2018 - Present        UTI (urinary tract infection) ICD-10-CM: N39.0  ICD-9-CM: 599.0  2018 - Present        Encounter for long-term (current) use of insulin ([facility] ICD-10-CM: Z79.4  ICD-9-CM: V58.67  2016 - Present        Hypothyroidism due to acquired atrophy of thyroid ICD-10-CM: E03.4  ICD-9-CM: 244.8, 246.8  2015 - Present        Essential hypertension ICD-10-CM: I10  ICD-9-CM: 401.9  2015 - Present        Type 2 diabetes mellitus with diabetic nephropathy ([facility] ICD-10-CM: E11.21  ICD-9-CM: 250.40, 583.81  2015 - Present        Obesity ICD-10-CM: E66.9  ICD-9-CM: 278.00  2015 - Present        Type II or unspecified type diabetes mellitus without mention of complication, uncontrolled ICD-10-CM: E11.65  ICD-9-CM: 250.02  2014 - Present        Acquired hypothyroidism ICD-10-CM: E03.9  ICD-9-CM: 244.9  2014 - Present        RESOLVED: HTN (hypertension) ICD-10-CM: I10  ICD-9-CM: 401.9  2014 - 2015              Take Home Medications       · It is important that you take the medication exactly as they are prescribed.    · Keep your medication in the bottles provided by the pharmacist and keep a list of the medication names, dosages, and times to be taken in your wallet. · Do not take other medications without consulting your doctor. What to do at Home    Recommended diet: Resume previous diet    Recommended activity: Activity as tolerated    If you experience worsening symptoms, please follow up with nearest ER. Follow-up with your PCP in 2 weeks        Information obtained by :  I understand that if any problems occur once I am at home I am to contact my physician. I understand and acknowledge receipt of the instructions indicated above.                                                                                                                                            Physician's or R.N.'s Signature                                                                  Date/Time                                                                                                                                              Patient or Representative Signature                                                          Date/Time

## 2018-06-25 NOTE — ADT AUTH CERT NOTES
6/24 ADDITIONAL CLINICAL by Nahum Akers RN        Review Status Review Entered       In Primary 6/25/2018       Details         INTERNAL MED (6/24): Assessment and Plan:      Acute on chronic CVA (cerebral vascular accident) ([address] (6/21/2018). CT head with acute occipital stroke. MRI/MRA head/neck show noted stroke and posterior circulation AS vascular disease. Continue ASA but at 81 mg. LDL is 35 but TG remain elevated, okay with no statin as it is below goal for CVA guidelines, remain on tricor for TG elevation. PT/OT/SLP. Appreciate neurology evaluation. TTE shows good LVEF and no shunt. CM consult for [facility] v. [facility] v.      Epigastric abdominal pain (6/21/2018)/ Nausea and vomiting (6/21/2018). EGD reviewed with Dr. Melissa Oliveira. GROSSLY abnormal with duodenal bulb ulceration, severe/necrotic esophagitis. Continue BID PPI. Carafate. Clears and advancing diet per GI. Appreciate GI consult and biopsy/pathology pending.      Acute on chronic renal failure, stage III likely due to diabetes mellitus (HCC) (6/21/2018). This is likely secondary to persistent vomiting and poor PO intake. Improved with IVFs.      GASTRO 6/24:  Assessment:      Active Problems:    Obesity (2/16/2015)       Hypothyroidism due to acquired atrophy of thyroid (11/18/2015)       Essential hypertension (11/18/2015)       Type 2 diabetes mellitus with diabetic nephropathy ([address] (11/18/2015)       CVA (cerebral vascular accident) ([address] (6/21/2018)       Hyperlipidemia (6/21/2018)       BPH (benign prostatic hyperplasia) (6/21/2018)       Nausea and vomiting (6/21/2018)       Epigastric abdominal pain (6/21/2018)       Acute on chronic renal failure ([address] (6/21/2018)       UTI (urinary tract infection) (6/21/2018)           Plan:      1.  Will change pantoprazole from intravenous to oral  2.  Will advance to a full liquid diet        NEUROLOGY (6/23)     Stroke right occipital     Case discussed with Dr. Emma Ledesma and chart, films reviewed  He has subacute right occipital infarct which would explain his clinical findings  Right PCA occluded  Anterior circulation open  Carotids good  Echo with nml EF and no shunt  LDL 35  On ASA 81 mg (was on this prior to admission) and GI following for possible melena with endoscopy demonstrating larger ulcer and friable tissue and cytotec added      Imp/plan  Subacute right occipital CVA  Given this occurred while on 81mg ASA would like to change to either 325mg or change to Plavix however there is the issue of the gastric ulcer  Perhaps Plavix would be a better option in that it would not be expected to contribute to the gastritis but may have increased bleeding risk  Will leave at 81mg for now  If no objection from GI, would change from ASA to Plavix 75mg daily  Cont therapies, stroke teaching etc per protocol  Discussed with him my thoughts re Plavix, mech of action and potential risks as well as wanting to get input from GI regarding ASA vs Plavix. He had good questions re: his sight and I drew some pictures on whit eboard to illustrate the CVA, visual pathways and explain his vision is not likely to return but the brain will eventually ignore the blind spot.     6/24      LABS: WBC 7.8, , BUN 23, CR 1.59, GFRNAA 42     MEDS: ASA 81MG PO DAILY, TENORMIN 2PO DAILY, LEXAPRO PO DAILY, TRICOR PO DAILY, HEPARIN 5000 UNITS SC Q8H, SYNTHROID 75MCG PO DAILY, LISINOPRIL 40MG PO DAILY, CYTOTEC PO BID, PROTONIX PO BID, CARAFATE PO BID, HYTRIN PO QHS, PROTNIX 40MG IV      VITALS: T99, P73, RR 18, /55, 98% RA                MRI RESULTS by Aric Teran RN        Review Status Review Entered       In Primary 6/25/2018       Details          MRI BRAIN      Study Result      Clinical indication: Gait issues, CVA.     Technical factors: Diffusion imaging, sagittal T1-weighted, axial T1-weighted  T2-weighted FLAIR gradient echo coronal T2-weighted.  No prior.     Diffusion imaging show acute tosubacute ischemic changes right occipital. There  are some changes of chronic right occipital infarct. No significant mass effect,  no associated hemorrhage. There is no extra-axial fluid collection hemorrhage or  shift, major flow voids in vessels at the base of the brain are present. No  masses or. Mild nonspecific white matter changes. Normal sized ventricle.     IMPRESSION   impression: Acute to subacute right occipital infarct superimposed on chronic  finding in same area. . Mild atrophy and nonspecific white matter disease. No  significant mass effect or associated hemorrhage.               MRI CERCIVAL SPINE:      Study Result      Clinical indication: CVA. Gait abnormalities.     Technical factors: Sagittal T1-weighted T2-weighted and STIR, axial T1-weighted  T2-weighted C1 T1. No prior.     There is no intrinsic cord lesion, there is no evidence for bone marrow  replacement. The alignment is satisfactory no paraspinal masses or fluid  collections.        C2-C3 shows no focal findings. There is central disc bulging. C3-C4 shows central disc bulging. No significant canal or foraminal compromise. C4-C5 show some mild canal stenosis. There is central disc protrusion with some  pressure on the cord centrally. There is prominent foraminal stenosis mainly on  the left. C5-C6 show also some mild spinal stenosis. Disc protrusion and facet  hypertrophy. Foraminal narrowing is more prominent on the right. C6-C7 C7-T1 shows no focal findings.     IMPRESSION   impression: Canal stenosis C4-5 C5-6 as above.         MRA BRAIN      Study Result      Indication: Gait abnormalities.     MRA of the [place] obtained without contrast with review of the raw  data and MIP reconstructions are.     There is some stenosis at the origin of the left posterior cerebral artery with  distal flow. There is an occlusion at the origin the right posterior cerebral  artery. Anterior and middle circulations show no proximal stenosis or.  There is  no vascular malformation or aneurysm.     IMPRESSION   impression:  bilateral posterior cerebral arteries significant disease.

## 2018-06-25 NOTE — PROGRESS NOTES
[address] NP  [phone]           GI PROGRESS NOTE        NAME: Kaylie Peña   :  1940   MRN:  [id number]       Subjective: Tolerated GI lite diet for breakfast.  States that he has no abdominal discomfort. No nausea or vomiting. Objective:         VITALS:   Last 24hrs VS reviewed since prior progress note. Most recent are:  Visit Vitals    /64 (BP 1 Location: Left arm, BP Patient Position: Sitting)    Pulse 61    Temp 97.8 °F (36.6 °C)    Resp 18    Ht 5' 11\" (1.803 m)    Wt 93.5 kg (206 lb 2.1 oz)    SpO2 96%    BMI 28.75 kg/m2       Intake/Output Summary (Last 24 hours) at 18 1046  Last data filed at 18 0746   Gross per 24 hour   Intake             1430 ml   Output             1050 ml   Net              380 ml       PHYSICAL EXAM:  General: Alert, in no acute distress    HEENT: Anicteric sclerae. Lungs:            CTA Bilaterally. Heart:  Regular  rhythm,    Abdomen: Soft, obese, Non distended, Non tender.  (+)Bowel sounds, no HSM  Extremities: No c/c/e  Neurologic:  CN 2-12 gi, Alert and oriented X 3. No acute neurological distress   Psych:   Good insight. Not anxious nor agitated. Lab Data Reviewed:   Recent Labs      18   0135  18   0808   WBC  9.5  7.8   HGB  11.2*  12.5   HCT  33.8*  37.5   PLT  262  272     Recent Labs      18   0135  18   0808  18   0501   NA  140  140  141   K  3.3*  3.7  3.4*   CL  104  105  107   CO2  28  27  24   BUN  21*  23*  34*   CREA  1.67*  1.59*  1.55*   GLU  134*  106*  64*   PHOS  2.7   --   2.8   CA  8.3*  8.3*  8.2*     No results for input(s): SGOT, GPT, AP, TBIL, TP, ALB, GLOB, GGT, AML, LPSE in the last 72 hours.     No lab exists for component: AMYP, HLPSE    ________________________________________________________________________  Patient Active Problem List   Diagnosis Code    Type II or unspecified type diabetes mellitus without mention of complication, uncontrolled E11.65    Acquired hypothyroidism E03.9    Obesity E66.9    Hypothyroidism due to acquired atrophy of thyroid E03.4    Essential hypertension I10    Type 2 diabetes mellitus with diabetic nephropathy ([facility] E11.21    Encounter for long-term (current) use of insulin ([facility]) Z79.4    CVA (cerebral vascular accident) ([facility] I63.9    Hyperlipidemia E78.5    BPH (benign prostatic hyperplasia) N40.0    Nausea and vomiting R11.2    Epigastric abdominal pain R10.13    Acute on chronic renal failure (HCC) N17.9, N18.9    UTI (urinary tract infection) N39.0         Assessment and Plan:  Erosive Esophagitis:  Noted on EGD done on 6/22. - Diet as tolerated. - Continue PO PPI BID.  - Continue Carafate 1g BID.  - No NSAIDs. - ok for 81mg aspirin daily. Ok to discharge from GI standpoint. I have instructed the patient to call for a follow up visit in our office within one to two weeks of his discharge. Please call with any questions or concerns. Thank you.        Signed By: Bernardo Tao NP     6/25/2018  10:46 AM

## 2018-06-25 NOTE — PROGRESS NOTES
Problem: Self Care Deficits Care Plan (Adult)  Goal: *Acute Goals and Plan of Care (Insert Text)  Occupational Therapy Goals  Initiated 6/22/2018  1. Patient will perform grooming and bathing standing at sink with modified independence within 7 day(s). 2.  Patient will perform lower body dressing including gathering clothing with modified independence within 7 day(s). 3.  Patient will perform simple home management with modified independence within 7 day(s). 4.  Patient will perform toilet transfers with modified independence within 7 day(s). 5.  Patient will perform all aspects of toileting with modified independence within 7 day(s). 6.  Patient will participate in upper extremity therapeutic exercise/activities with independence for 10 minutes within 7 day(s). 7.  Patient will utilize energy conservation techniques during functional activities with verbal and visual cues within 7 day(s). Occupational Therapy TREATMENT  Patient: Jhoan Lawton (45 y.o. male)  Date: 6/25/2018  Diagnosis: CVA (cerebral vascular accident) [facility])  [facility]  nausea vomiting <principal problem not specified>  Procedure(s) (LRB):  ESOPHAGOGASTRODUODENOSCOPY (EGD) (N/A)  ESOPHAGOGASTRODUODENAL (EGD) BIOPSY (N/A) 3 Days Post-Op  Precautions: Fall, DNR  Chart, occupational therapy assessment, plan of care, and goals were reviewed. ASSESSMENT:  Pt seated in chair having recently moved to room 528. Pt ambulated to bathroom and transferred to commode with use of grab bar on the left with supervision. He stood for 14 minutes to wash upper and lower body with mod  I. No loss of balance. Pt cued to scan his environment Left and Right as he verbalized he has a \"blind spot\" on his L. Pt stated he has difficulty standing with one LE behind the other. Pt pleasant and cooperative. Recommend home health.   Progression toward goals:  [x]       Improving appropriately and progressing toward goals  [] Improving slowly and progressing toward goals  []       Not making progress toward goals and plan of care will be adjusted     PLAN:  Patient continues to benefit from skilled intervention to address the above impairments. Continue treatment per established plan of care. Discharge Recommendations:  Home health  Further Equipment Recommendations for Discharge: None for OT     SUBJECTIVE:   Patient stated I am not even tired or nothing\"    OBJECTIVE DATA SUMMARY:   Cognitive/Behavioral Status:  Neurologic State: Alert; Appropriate for age  Orientation Level: Oriented X4  Cognition: Appropriate decision making             Functional Mobility and Transfers for ADLs:  Bed Mobility:     Not tested as pt already up in the chair. Transfers:   Supervision sit to stand          Balance: Intact sitting balance. ADL Intervention:            Upper Body Bathing  Bathing Assistance: Modified independent standing at the sink    Lower Body Bathing  Bathing Assistance: Modified independent standing at the sink  Pain:  Pain Scale 1: Numeric (0 - 10)  Pain Intensity 1: 0              Activity Tolerance:   No signs/symptoms of distress or discomfort during OT  Please refer to the flowsheet for vital signs taken during this treatment.   After treatment:   [x] Patient left in no apparent distress sitting up in chair  [] Patient left in no apparent distress in bed  [x] Call bell left within reach  [] Nursing notified  [] Caregiver present  [] Bed alarm activated    COMMUNICATION/COLLABORATION:   The patients plan of care was discussed with: Occupational Therapist    DONIS Wilson  Time Calculation: 15 mins

## 2018-06-25 NOTE — PROGRESS NOTES
Problem: Mobility Impaired (Adult and Pediatric)  Goal: *Acute Goals and Plan of Care (Insert Text)  Physical Therapy Goals  Initiated 6/22/2018  1. Patient will move from supine to sit and sit to supine  in bed with independence within 7 day(s). 2.  Patient will transfer from bed to chair and chair to bed with modified independence using the least restrictive device within 7 day(s). 3.  Patient will perform sit to stand with modified independence within 7 day(s). 4.  Patient will ambulate with modified independence for 450 feet with the least restrictive device within 7 day(s). 5.  Patient will ascend/descend 3 stairs with handrail(s) with modified independence within 7 day(s). 6.  Patient will increase Torres Balance Assessment score by at least 6 points within 7 days. physical Therapy TREATMENT  Patient: Michelle Pollard (89 y.o. male)  Date: 6/25/2018  Diagnosis: CVA (cerebral vascular accident) [facility])  CVA (cerebral vascular accident) ([facility]  nausea vomiting <principal problem not specified>  Procedure(s) (LRB):  ESOPHAGOGASTRODUODENOSCOPY (EGD) (N/A)  ESOPHAGOGASTRODUODENAL (EGD) BIOPSY (N/A) 3 Days Post-Op  Precautions: Fall, DNR  Chart, physical therapy assessment, plan of care and goals were reviewed. ASSESSMENT:  Patient continues with Left lateral visual field deficits. Therapy session focused on visual scanning with short distance (50 feet) repeat scanning for objects while ambulating. Patient only able to correctly identify 50% of objects on the left and 90% of objects on the Right with visual scanning. He required occasional reminders. Patient has improved static standing balance but still requires tactile assistance for dynamic balance activities. His TORRES balance improved from 27/56 to 32/56.   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  Patient was educated that he would benefit from therapy at discharge, but if returning home will require 24 hour hands on physical assistance due to visual field deficits and balance deficits- he verbalized understanding. Progression toward goals:  [x]    Improving appropriately and progressing toward goals  []    Improving slowly and progressing toward goals  []    Not making progress toward goals and plan of care will be adjusted     PLAN:  Patient continues to benefit from skilled intervention to address the above impairments. Continue treatment per established plan of care. Discharge Recommendations:  Home Health with transition to outpatient for vision therapy  Further Equipment Recommendations for Discharge:  TBD     SUBJECTIVE:   Patient stated .    OBJECTIVE DATA SUMMARY:   Critical Behavior:  Neurologic State: Alert, Appropriate for age  Orientation Level: Oriented X4  Cognition: Appropriate decision making  Safety/Judgement: Awareness of environment  Functional Mobility Training:  Bed Mobility:                    Transfers:  Sit to Stand: Supervision  Stand to Sit: Supervision        Bed to Chair: Contact guard assistance                    Balance:  Sitting: Intact  Standing: Impaired  Standing - Static: Good  Standing - Dynamic : Fair  Ambulation/Gait Training:  Distance (ft): 100 Feet (ft)  Assistive Device: Gait belt  Ambulation - Level of Assistance: Contact guard assistance                                               Stairs:              Neuro Re-Education:  Visual field scanning while ambulating: repeat 50 feet with no assistive device. He demonstrated no loss of balance. However objects (colored numbers) placed in the upper and lower quadrants: 50% compliance on the Left, 90% compliance on the Right- he might have missed the 1 object on the Right due to the color (yellow) matched the color on the wall however the number was written in black.   Overall he requires hands on assistance due to continued field deficits on the Left Sandoval Balance Test:    Sitting to Standing: 3  Standing Unsupported: 3  Sitting with Back Unsupported: 4  Standing to Sitting: 3  Transfers: 2  Standing Unsupported with Eyes Closed: 2  Standing Unsupported with Feet Together: 1  Reach Forward with Outstretched Arm: 4   Object: 3  Turn to Look Over Shoulders: 3  Turn 360 Degrees: 1  Alternate Foot on Step/Stool: 0  Standing Unsupported One Foot in Front: 1  Stand on One Le  Total: 31         56=Maximum possible score;   0-20=High fall risk  21-40=Moderate fall risk   41-56=Low fall risk     Sandoval Balance Test and G-code impairment scale:  Percentage of Impairment CH    0%   CI    1-19% CJ    20-39% CK    40-59% CL    60-79% CM    80-99% CN     100%   Sandoval   Score 0-56 56 45-55 34-44 23-33 12-22 1-11 0       Therapeutic Exercises:     Pain:  Pain Scale 1: Numeric (0 - 10)  Pain Intensity 1: 0              Activity Tolerance:   Good- no complications and vitals stable  Please refer to the flowsheet for vital signs taken during this treatment.   After treatment:   [x]    Patient left in no apparent distress sitting up in chair  []    Patient left in no apparent distress in bed  [x]    Call bell left within reach  [x]    Nursing notified  []    Caregiver present  [x]    Chair alarm activated    COMMUNICATION/COLLABORATION:   The patients plan of care was discussed with: Registered Nurse    Ezio Hilario PT, DPT   Time Calculation: 26 mins

## 2018-06-25 NOTE — PROGRESS NOTES
Bedside shift change report given to Michelle Waddell RN (oncoming nurse) by Chirag Martins RN (offgoing nurse). Report included the following information SBAR, Kardex and MAR.

## 2018-06-25 NOTE — DIABETES MGMT
Progress Note     Chart reviewed for elevated blood glucose ( > 180 mg/dL x 2 in the past 24 hours). Recommendations/ Comments: If appropriate, please consider adding diabetic restrictions to current diet order. Morning glucose: 92 mg/dL (per am POCT Glucose). Required 9 units of correction in the last 24 hours. Inpatient medications for glucose management:  1. Correction Scale: Lispro (Humalog) Normal Sensitivity scale to cover for glucose > 139 mg/dL before meals and for glucose >199 at bedtime      2. Lantus 20 units at bedtime     POC Glucose last 24hrs:   Lab Results   Component Value Date/Time     (H) 06/25/2018 01:35 AM    GLUCPOC 154 (H) 06/25/2018 11:01 AM    GLUCPOC 92 06/25/2018 07:28 AM    GLUCPOC 228 (H) 06/24/2018 09:17 PM        Estimated Creatinine Clearance: 41.9 mL/min (based on Cr of 1.67). Diet order:   Active Orders   Diet    DIET GI LITE (POST SURGICAL)        PO intake: Patient Vitals for the past 72 hrs:   % Diet Eaten   06/25/18 1204 100 %   06/25/18 0904 100 %   06/24/18 1612 100 %   06/23/18 0930 15 %       History of Diabetes:   Tyra Pollard is a 76 y.o. male with a past medical history significant for DM per  Gilford Saunders, MD's H&P dated 6/22/2018. Prior to admission medications for glucose management: per past medical records  -Humalog 8 units before meals   -Levemir 62 units daily  -Amaryl 4mg every morning    A1C:   Lab Results   Component Value Date/Time    Hemoglobin A1c 9.6 (H) 06/22/2018 01:33 AM    Hemoglobin A1c 9.4 (H) 06/20/2016 09:25 AM    Hemoglobin A1c, External 10.7 05/30/2018    Hemoglobin A1c, External 8.6 02/26/2016       Reference range*:  Increased risk for diabetes: 5.7 - 6.4%  Diabetes: >6.4%  Glycemic control for adults with diabetes: <7.0 %    *RALPH GILES (2014). Diagnosis and classification of diabetes mellitus. Diabetes care, 37, S81. Thank you. Adrianne Montes.  Alejandra De Oliveira MPH, RN, BSN, [address]   [phone]    -A target glucose range of 140180 mg/dL (7.810.0 mmol/L) is recommended for the majority of critically ill patients and noncritically ill patients. -More stringent goals, such as 110140 mg/dL (6.17.8 mmol/L), may be appropriate for selected patients, if this can be achieved without significant hypoglycemia. *    *American Diabetes Association. 14. Diabetes care in the hospital:Standards of Medical Care in [id number]. Diabetes Care 2018;41(Suppl.  1):K985S114

## 2018-06-25 NOTE — PROGRESS NOTES
Nutrition Assessment:    RECOMMENDATIONS/INTERVENTION(S):   1. Continue GI lite diet. 2. If intake declines, Ensure HP BID    3. Monitor diet tolerance, GI status and intake and diet education needs. ASSESSMENT:   6/25: Follow-up on patient. Diet advanced to GI lite. Pt reports good tolerance, no nausea or GI distress. Appetite is back to baseline now, reportedly. Nursing reporting intake of 100% of meals the past 2 days. K 3.3, BG -228. Potential to D/C soon. Wt down to 206 lbs. Pt stated he needs to \"watch what he eats now\" and calls himself \"a microwave . \" Author and patient discussed some microwave meal options to prepare. 6/22: Pt screened for potential dysphagia risk. Admitted for CVA, intractable N/V, acute on chronic renal failure. PMHx for HTN and DM. A1c 9.6, , 284, 96. Patient reports decreased appetite starting 3 weeks ago with vertigo/dizziness with nothing to eat the past  3 days PTA due to nausea/Vomiting, -225 lbs, now 201lbs. Pt reports losing the weight in the past 3 weeks (8% body wt). GI following, endoscopy this afternoon with findings of esophagitis and duodenal ulcers. Pt started on clear liquid diet, and had about 25% of the tray. Seemed to be tolerating those few bites. Pt open to trying ONS. States it not his favorite but is ok with drinking it to get better.      Meets Criteria for Acute Malnutrition   [x] Severe Malnutrition, as evidenced by:   [] Moderate muscle wasting, loss of subcutaneous fat   [x] Nutritional intake of <50% of recommended intake for >5 days   [x] Weight loss of >1-2% in 1 week, >5% in 1 month, >7.5% in 3 months, or >10% in 6 months   [] Moderate-severe edema         SUBJECTIVE/OBJECTIVE:   Diet Order: GI Lite  % Eaten:    Patient Vitals for the past 72 hrs:   % Diet Eaten   06/25/18 1204 100 %   06/25/18 0904 100 %   06/24/18 1612 100 %   06/23/18 0930 15 %     Pertinent Medications: [x] Reviewed- lantus, lispro, protonix, neutra-phos   Past Medical History:   Diagnosis Date    BPH (benign prostatic hyperplasia)     Diabetes ([address]     HTN (hypertension)     Hyperlipidemia     Thyroid disease     Hypo thyroid    Type II or unspecified type diabetes mellitus without mention of complication, uncontrolled        Chemistries:  Lab Results   Component Value Date/Time    Sodium 140 06/25/2018 01:35 AM    Potassium 3.3 (L) 06/25/2018 01:35 AM    Chloride 104 06/25/2018 01:35 AM    CO2 28 06/25/2018 01:35 AM    Anion gap 8 06/25/2018 01:35 AM    Glucose 134 (H) 06/25/2018 01:35 AM    BUN 21 (H) 06/25/2018 01:35 AM    Creatinine 1.67 (H) 06/25/2018 01:35 AM    BUN/Creatinine ratio 13 06/25/2018 01:35 AM    GFR est AA 48 (L) 06/25/2018 01:35 AM    GFR est non-AA 40 (L) 06/25/2018 01:35 AM    Calcium 8.3 (L) 06/25/2018 01:35 AM    AST (SGOT) 18 06/21/2018 02:21 PM    Alk. phosphatase 68 06/21/2018 02:21 PM    Protein, total 7.4 06/21/2018 02:21 PM    Albumin 3.6 06/21/2018 02:21 PM    Globulin 3.8 06/21/2018 02:21 PM    A-G Ratio 0.9 (L) 06/21/2018 02:21 PM    ALT (SGPT) 23 06/21/2018 02:21 PM      Anthropometrics: Height: 5' 11\" (180.3 cm) Weight: 93.5 kg (206 lb 2.1 oz)    IBW (%IBW): 80.9 kg (178 lb 5.6 oz) (112.73 %) UBW (%UBW): 99.3 kg (219 lb) (91.81 %)    BMI: Body mass index is 28.75 kg/(m^2). This BMI is indicative of:   [] Underweight    [] Normal    [x] Overweight    []  Obesity    []  Extreme Obesity (BMI>40)  Estimated Nutrition Needs (Based on): 2143 Kcals/day (BMR 1649 x 1.3 AF) , 91 g (1g/kg actual bw) Protein  Carbohydrate: At Least 130 g/day  Fluids: 2150 mL/day (1 ml/kcal)    Last BM: 6/24   [x]Active     []Hyperactive  []Hypoactive       [] Absent   BS  Skin:    [x] Intact   [] Incision  [] Breakdown   [] DTI   [] Tears/Excoriation/Abrasion  []Edema [] Other:    Wt Readings from Last 30 Encounters:   06/25/18 93.5 kg (206 lb 2.1 oz)   01/19/18 100 kg (220 lb 7.4 oz)   01/17/18 103.4 kg (228 lb)   06/27/16 103.3 kg (227 lb 12.8 oz)   03/18/16 103.4 kg (228 lb)   11/18/15 103.1 kg (227 lb 3.2 oz)   08/03/15 106.1 kg (233 lb 12.8 oz)   02/16/15 102.5 kg (226 lb)   11/18/14 100.5 kg (221 lb 9.6 oz)   09/29/14 98.5 kg (217 lb 3.2 oz)   08/11/14 103.9 kg (229 lb)   11/23/13 104.8 kg (231 lb)      NUTRITION DIAGNOSES:   Problem:  Inadequate oral intake     Etiology: related to Conditions related to dx: CVA, esophagitis, duodenal ulcer     Signs/Symptoms: as evidenced by Pt reported decreased intake and 8% wt loss in 3 weeks       6/25: Acute nutrition Dx improving with diet tolerance    NUTRITION INTERVENTIONS: General/healthful diet, commercial nutrition supplements.                      GOAL:   Patient to consume % of meals and snacks with good tolerance in the next 3-5 days    Cultural, Islam, or Ethnic Dietary Needs: None     EDUCATION & DISCHARGE NEEDS:    [x] None Identified at this time   [] Identified and Education Provided/Documented   [] Identified and Pt declined/was not appropriate      [x] Interdisciplinary Care Plan Reviewed/Documented    [x] Discharge Needs: TBD   [] No Nutrition Related Discharge Needs    NUTRITION RISK:   Pt Is At Nutrition Risk  [x]     No Nutrition Risk Identified  []       PT SEEN FOR:    []  MD Consult: []Calorie Count      []Diabetic Diet Education        []Diet Education     []Electrolyte Management     []General Nutrition Management and Supplements     []Management of Tube Feeding     []TPN Recommendations    []  RN Referral:  []MST score >=2     []Enteral/Parenteral Nutrition PTA     []Pregnant: Gestational DM or Multigestation                 [] Pressure Ulcer    []  Low BMI      []  Length of Stay       [] Dysphagia         [] Ventilator  [x]  Follow-up     Previous Recommendations:   [x] Implemented          [] Not Implemented          [] Not Applicable    Previous Goal:   [x] Met              [] Progressing Towards Goal              [] Not Progressing Towards Goal   [x] Not [address] [facility], [address] [place] [address]  Pager [phone]  Office [phone]

## 2018-06-25 NOTE — PROGRESS NOTES
1347  TRANSFER - OUT REPORT:    Verbal report given to Lisa Turner (name) on Jonathan Church  being transferred to 5th floor (unit) for routine progression of care       Report consisted of patients Situation, Background, Assessment and   Recommendations(SBAR). Information from the following report(s) SBAR, Kardex, Intake/Output, MAR, Recent Results and Cardiac Rhythm NSR was reviewed with the receiving nurse. Lines:   Peripheral IV 06/23/18 Left Forearm (Active)   Site Assessment Clean, dry, & intact 6/25/2018  9:04 AM   Phlebitis Assessment 0 6/25/2018  9:04 AM   Infiltration Assessment 0 6/25/2018  9:04 AM   Dressing Status Clean, dry, & intact 6/25/2018  9:04 AM   Dressing Type Transparent;Tape 6/25/2018  9:04 AM   Hub Color/Line Status Blue;Capped 6/25/2018  9:04 AM   Action Taken Open ports on tubing capped 6/24/2018 11:13 PM   Alcohol Cap Used Yes 6/25/2018  9:04 AM        Opportunity for questions and clarification was provided.       Patient transported with:   [place]

## 2018-06-26 VITALS
HEART RATE: 72 BPM | RESPIRATION RATE: 18 BRPM | WEIGHT: 206.13 LBS | DIASTOLIC BLOOD PRESSURE: 72 MMHG | OXYGEN SATURATION: 94 % | BODY MASS INDEX: 28.86 KG/M2 | SYSTOLIC BLOOD PRESSURE: 145 MMHG | TEMPERATURE: 98 F | HEIGHT: 71 IN

## 2018-06-26 LAB
GLUCOSE BLD STRIP.AUTO-MCNC: 134 MG/DL (ref 65–100)
GLUCOSE BLD STRIP.AUTO-MCNC: 239 MG/DL (ref 65–100)
SERVICE CMNT-IMP: ABNORMAL
SERVICE CMNT-IMP: ABNORMAL

## 2018-06-26 PROCEDURE — 74011250637 HC RX REV CODE- 250/637: Performed by: INTERNAL MEDICINE

## 2018-06-26 PROCEDURE — 74011250636 HC RX REV CODE- 250/636: Performed by: INTERNAL MEDICINE

## 2018-06-26 PROCEDURE — 82962 GLUCOSE BLOOD TEST: CPT

## 2018-06-26 PROCEDURE — 74011636637 HC RX REV CODE- 636/637: Performed by: INTERNAL MEDICINE

## 2018-06-26 PROCEDURE — 77030038269 HC DRN EXT URIN PURWCK BARD -A

## 2018-06-26 RX ADMIN — HEPARIN SODIUM 5000 UNITS: 5000 INJECTION, SOLUTION INTRAVENOUS; SUBCUTANEOUS at 13:51

## 2018-06-26 RX ADMIN — INSULIN LISPRO 3 UNITS: 100 INJECTION, SOLUTION INTRAVENOUS; SUBCUTANEOUS at 12:22

## 2018-06-26 RX ADMIN — ASPIRIN 81 MG 81 MG: 81 TABLET ORAL at 08:54

## 2018-06-26 RX ADMIN — PANTOPRAZOLE SODIUM 40 MG: 40 TABLET, DELAYED RELEASE ORAL at 06:22

## 2018-06-26 RX ADMIN — Medication 10 ML: at 06:23

## 2018-06-26 RX ADMIN — LISINOPRIL 40 MG: 20 TABLET ORAL at 08:54

## 2018-06-26 RX ADMIN — LEVOTHYROXINE SODIUM 75 MCG: 75 TABLET ORAL at 06:23

## 2018-06-26 RX ADMIN — MISOPROSTOL 200 MCG: 200 TABLET ORAL at 08:54

## 2018-06-26 RX ADMIN — HEPARIN SODIUM 5000 UNITS: 5000 INJECTION, SOLUTION INTRAVENOUS; SUBCUTANEOUS at 06:22

## 2018-06-26 RX ADMIN — SUCRALFATE 1 G: 1 SUSPENSION ORAL at 08:55

## 2018-06-26 NOTE — PROGRESS NOTES
Verbal shift change report given to Jeffery Arvizu RN (oncoming nurse) by Daxa Pelayo RN (offgoing nurse). Report included the following information SBAR, Kardex, Intake/Output, MAR, Recent Results and Med Rec Status.

## 2018-06-26 NOTE — CDMP QUERY
=> Acute severe protein-calorie malnutrition AEB unintended wt loss of 8% in 3 weeks, decreased po intake of <50% for over 5 days requiring a dietician consult   => Other explanation of clinical findings   => Clinically Undetermined (no explanation for clinical findings)    The medical record reflects the following clinical findings, treatment, and risk factors. Risk Factors:  75 yo M admitted with an acute CVA and erosive esophagitis   Clinical Indicators:  6/22 dietician consult states \" losing the weight in the past 3 weeks (8% body wt)\"   \"Meets Criteria for Acute Malnutrition Severe Malnutrition, as evidenced by:  Nutritional intake of <50% of recommended intake for >5 days  Weight loss of >1-2% in 1 week, >5% in 1 month, >7.5% in 3 months, or >10% in 6 months  Treatment: GI Lite diet      Please clarify and document your clinical opinion in the progress notes and discharge summary including the definitive and/or presumptive diagnosis, (suspected or probable), related to the above clinical findings. Please include clinical findings supporting your diagnosis.     Thank you for your time   [facility] RN/BSN, [address]  Desk:   [phone]   Other:  [phone]

## 2018-06-26 NOTE — DISCHARGE SUMMARY
Physician Discharge Summary     Patient ID:  Cy Dalton  [id number]  76 y.o.  1940    Admit date: 6/21/2018    Discharge date and time: 6/26/2018    Admission Diagnoses: CVA (cerebral vascular accident) [facility])  CVA (cerebral vascular accident) ([address]  nausea vomiting    Discharge Diagnoses:    Principal Diagnosis   <principal problem not specified>                                             Other Diagnoses  Active Problems:    Obesity (2/16/2015)      Hypothyroidism due to acquired atrophy of thyroid (11/18/2015)      Essential hypertension (11/18/2015)      Type 2 diabetes mellitus with diabetic nephropathy ([address] (11/18/2015)      CVA (cerebral vascular accident) ([address] (6/21/2018)      Hyperlipidemia (6/21/2018)      BPH (benign prostatic hyperplasia) (6/21/2018)      Nausea and vomiting (6/21/2018)      Epigastric abdominal pain (6/21/2018)      Acute on chronic renal failure ([address] (6/21/2018)      UTI (urinary tract infection) (6/21/2018)         Hospital Course:     Acute on chronic CVA (cerebral vascular accident) ([address] (6/21/2018). CT head with acute occipital stroke. MRI/MRA head/neck show noted stroke and posterior circulation AS vascular disease. Continue ASA but at 81 mg (discussed with GI, remain on 81 mg until follow-up with them and possible second look EGD). LDL is 35 but TG remain elevated, okay with no statin as it is below goal for CVA guidelines, remain on tricor for TG elevation. PT/OT/SLP. Appreciate neurology evaluation. TTE shows good LVEF and no shunt. CM consult for [facility] v. [facility] v.       Epigastric abdominal pain (6/21/2018)/ Nausea and vomiting (6/21/2018). EGD reviewed with Dr. Denisse Nicole. GROSSLY abnormal with duodenal bulb ulceration, severe/necrotic esophagitis. Continue BID PPI. Carafate. Clears and advancing diet per GI.  Appreciate GI consult and biopsy/pathology shows only acute on chronic inflammation and tissue necrosis      Acute on chronic renal failure, stage III likely due to diabetes mellitus ([facility] (6/21/2018). This is likely secondary to persistent vomiting and poor PO intake. Improved with IVFs.     Hypothyroidism due to acquired atrophy of thyroid (11/18/2015). Continue synthroid. TSH wnl      Essential hypertension (11/18/2015). Continue home atenolol. Resume lisniopril as he is at his baseline creatinine      Type 2 diabetes mellitus with diabetic nephropathy ([facility] (11/18/2015). 9.6% A1C. Reduced lantus to 20 units due to hypoglycemia, monitor and titrate as needed. Continue SSI.      Hyperlipidemia (6/21/2018). LDL is in 30s, TG remains elevated but total cholesterol nml. Continue tricor, no indication for statin      BPH (benign prostatic hyperplasia) (6/21/2018). On terazosin       Obesity (2/16/2015). Would benefit from weight loss      Leukocytosis. Afebrile. UA negative. No ABx for now. Acute severe protein-calorie malnutrition AEB unintended wt loss of 8% in 3 weeks, decreased po intake of <50% for over 5 days requiring a dietician consult       PCP: Flip Charles MD    Consults: GI and Neurology    Significant Diagnostic Studies: See Hospital Course    Discharged home in improved condition. Discharge Exam:    Visit Vitals    /72 (BP 1 Location: Right arm, BP Patient Position: At rest)    Pulse 72    Temp 98 °F (36.7 °C)    Resp 18    Ht 5' 11\" (1.803 m)    Wt 93.5 kg (206 lb 2.1 oz)    SpO2 94%    BMI 28.75 kg/m2     Physical Exam:    Gen: Well-developed, well-nourished, in no acute distress  HEENT:  Pink conjunctivae, hearing intact to voice, moist mucous membranes  Neck: Supple  Resp: No accessory muscle use, clear breath sounds without wheezes rales or rhonchi  Card: No murmurs, normal S1, S2 without thrills or peripheral edema  Abd:  Soft, non-tender, non-distended, normoactive bowel sounds are present  Musc: No cyanosis  Skin: No rashes  Neuro:   Face symmetric, tongue midline, speech fluent,  strength is 5/5 bilaterally, hip flexion is 5/5 bilaterally, follows commands appropriately  Psych:  Good insight, oriented to person, place and time, alert    Disposition: Rehab    Patient Instructions:   Current Discharge Medication List      START taking these medications    Details   pantoprazole (PROTONIX) 40 mg tablet Take 1 Tab by mouth Before breakfast and dinner. Qty: 60 Tab, Refills: 1      sucralfate (CARAFATE) 100 mg/mL suspension Take 10 mL by mouth two (2) times a day for 14 days. Qty: 280 mL, Refills: 0         CONTINUE these medications which have CHANGED    Details   insulin detemir U-100 (LEVEMIR U-100 INSULIN) 100 unit/mL injection 30 Units by SubCUTAneous route daily. Qty: 1 Vial, Refills: 0         CONTINUE these medications which have NOT CHANGED    Details   escitalopram oxalate (LEXAPRO) 10 mg tablet Take 10 mg by mouth daily. cyanocobalamin (VITAMIN B-12) 100 mcg tablet Take 100 mcg by mouth daily. insulin aspart U-100 (NOVOLOG FLEXPEN U-100 INSULIN) 100 unit/mL inpn 8 Units by SubCUTAneous route Before breakfast, lunch, and dinner. levothyroxine (SYNTHROID) 75 mcg tablet Take 75 mcg by mouth Daily (before breakfast). glimepiride (AMARYL) 4 mg tablet TAKE 1 TABLET EVERY MORNING  Qty: 90 Tab, Refills: 3    Associated Diagnoses: Type 2 diabetes mellitus with hyperglycemia, with long-term current use of insulin (HCC)      atenolol (TENORMIN) 50 mg tablet Take 50 mg by mouth daily. Refills: 1      fenofibrate micronized (LOFIBRA) 134 mg capsule Take 134 mg by mouth daily. Refills: 3      lisinopril (PRINIVIL, ZESTRIL) 40 mg tablet Take 40 mg by mouth daily. omega-3 fatty acids-vitamin e (FISH OIL) 1,000 mg cap Take 3 Caps by mouth daily. aspirin delayed-release 81 mg tablet Take 81 mg by mouth daily. terazosin (HYTRIN) 2 mg capsule Take 2 mg by mouth nightly.            Activity: Activity as tolerated  Diet: Resume previous diet  Wound Care: None needed    Follow-up with GI in 1-2 weeks    Signed:  Josephine Floyd DO  6/26/2018  1:10 PM    Greater than 30 mins was spent in coordination, conuseling, and execution of this patient's discharge

## 2018-06-26 NOTE — PROGRESS NOTES
Bedside shift change report given to Claude Ammons, RN (oncoming nurse) by Nilam Treadwell RN (offgoing nurse). Report included the following information SBAR, Kardex and MAR.

## 2018-06-26 NOTE — PROGRESS NOTES
Spiritual Care Partner Volunteer visited patient in 65 on 6.26.18.   Documented by:    Kalyn Ramos M.Div, M.S, Jai Munson7 available at [phone]

## 2018-06-26 NOTE — PROGRESS NOTES
4:00 PM  I have reviewed discharge instructions with the patient and daughter. The patient and daughter verbalized understanding. Prescriptions given to patient. Patient taken via family auto to home.

## 2018-08-13 ENCOUNTER — OP HISTORICAL/CONVERTED ENCOUNTER (OUTPATIENT)
Dept: OTHER | Age: 78
End: 2018-08-13

## 2018-08-22 ENCOUNTER — ANESTHESIA EVENT (OUTPATIENT)
Dept: ENDOSCOPY | Age: 78
End: 2018-08-22
Payer: MEDICARE

## 2018-08-22 ENCOUNTER — ANESTHESIA (OUTPATIENT)
Dept: ENDOSCOPY | Age: 78
End: 2018-08-22
Payer: MEDICARE

## 2018-08-22 ENCOUNTER — HOSPITAL ENCOUNTER (OUTPATIENT)
Age: 78
Setting detail: OUTPATIENT SURGERY
Discharge: HOME | End: 2018-08-22
Attending: INTERNAL MEDICINE | Admitting: INTERNAL MEDICINE
Payer: MEDICARE

## 2018-08-22 VITALS
RESPIRATION RATE: 13 BRPM | TEMPERATURE: 97.6 F | WEIGHT: 206 LBS | DIASTOLIC BLOOD PRESSURE: 63 MMHG | OXYGEN SATURATION: 98 % | HEART RATE: 60 BPM | SYSTOLIC BLOOD PRESSURE: 135 MMHG | BODY MASS INDEX: 28.84 KG/M2 | HEIGHT: 71 IN

## 2018-08-22 LAB
GLUCOSE BLD STRIP.AUTO-MCNC: 246 MG/DL (ref 65–100)
SERVICE CMNT-IMP: ABNORMAL

## 2018-08-22 PROCEDURE — 74011250636 HC RX REV CODE- 250/636

## 2018-08-22 PROCEDURE — 74011250636 HC RX REV CODE- 250/636: Performed by: INTERNAL MEDICINE

## 2018-08-22 PROCEDURE — 88305 TISSUE EXAM BY PATHOLOGIST: CPT | Performed by: INTERNAL MEDICINE

## 2018-08-22 PROCEDURE — 76040000019: Performed by: INTERNAL MEDICINE

## 2018-08-22 PROCEDURE — 77030009426 HC FCPS BIOP ENDOSC BSC -B: Performed by: INTERNAL MEDICINE

## 2018-08-22 PROCEDURE — 76060000031 HC ANESTHESIA FIRST 0.5 HR: Performed by: INTERNAL MEDICINE

## 2018-08-22 PROCEDURE — 88342 IMHCHEM/IMCYTCHM 1ST ANTB: CPT | Performed by: INTERNAL MEDICINE

## 2018-08-22 PROCEDURE — 82962 GLUCOSE BLOOD TEST: CPT

## 2018-08-22 RX ORDER — PROPOFOL 10 MG/ML
INJECTION, EMULSION INTRAVENOUS AS NEEDED
Status: DISCONTINUED | OUTPATIENT
Start: 2018-08-22 | End: 2018-08-22 | Stop reason: HOSPADM

## 2018-08-22 RX ORDER — SODIUM CHLORIDE 9 MG/ML
INJECTION, SOLUTION INTRAVENOUS
Status: DISCONTINUED | OUTPATIENT
Start: 2018-08-22 | End: 2018-08-22 | Stop reason: HOSPADM

## 2018-08-22 RX ORDER — ATROPINE SULFATE 0.1 MG/ML
0.4 INJECTION INTRAVENOUS
Status: DISCONTINUED | OUTPATIENT
Start: 2018-08-22 | End: 2018-08-22 | Stop reason: HOSPADM

## 2018-08-22 RX ORDER — EPINEPHRINE 0.1 MG/ML
1 INJECTION INTRACARDIAC; INTRAVENOUS
Status: DISCONTINUED | OUTPATIENT
Start: 2018-08-22 | End: 2018-08-22 | Stop reason: HOSPADM

## 2018-08-22 RX ORDER — MIDAZOLAM HYDROCHLORIDE 1 MG/ML
.25-5 INJECTION, SOLUTION INTRAMUSCULAR; INTRAVENOUS
Status: DISCONTINUED | OUTPATIENT
Start: 2018-08-22 | End: 2018-08-22 | Stop reason: HOSPADM

## 2018-08-22 RX ORDER — SODIUM CHLORIDE 9 MG/ML
50 INJECTION, SOLUTION INTRAVENOUS CONTINUOUS
Status: DISCONTINUED | OUTPATIENT
Start: 2018-08-22 | End: 2018-08-22 | Stop reason: HOSPADM

## 2018-08-22 RX ORDER — DEXTROMETHORPHAN/PSEUDOEPHED 2.5-7.5/.8
1.2 DROPS ORAL
Status: DISCONTINUED | OUTPATIENT
Start: 2018-08-22 | End: 2018-08-22 | Stop reason: HOSPADM

## 2018-08-22 RX ORDER — LIDOCAINE HYDROCHLORIDE 20 MG/ML
INJECTION, SOLUTION EPIDURAL; INFILTRATION; INTRACAUDAL; PERINEURAL AS NEEDED
Status: DISCONTINUED | OUTPATIENT
Start: 2018-08-22 | End: 2018-08-22 | Stop reason: HOSPADM

## 2018-08-22 RX ORDER — NALOXONE HYDROCHLORIDE 0.4 MG/ML
0.4 INJECTION, SOLUTION INTRAMUSCULAR; INTRAVENOUS; SUBCUTANEOUS
Status: DISCONTINUED | OUTPATIENT
Start: 2018-08-22 | End: 2018-08-22 | Stop reason: HOSPADM

## 2018-08-22 RX ORDER — FLUMAZENIL 0.1 MG/ML
0.2 INJECTION INTRAVENOUS
Status: DISCONTINUED | OUTPATIENT
Start: 2018-08-22 | End: 2018-08-22 | Stop reason: HOSPADM

## 2018-08-22 RX ADMIN — SODIUM CHLORIDE: 9 INJECTION, SOLUTION INTRAVENOUS at 15:15

## 2018-08-22 RX ADMIN — PROPOFOL 50 MG: 10 INJECTION, EMULSION INTRAVENOUS at 15:25

## 2018-08-22 RX ADMIN — SODIUM CHLORIDE 50 ML/HR: 900 INJECTION, SOLUTION INTRAVENOUS at 13:26

## 2018-08-22 RX ADMIN — PROPOFOL 50 MG: 10 INJECTION, EMULSION INTRAVENOUS at 15:22

## 2018-08-22 RX ADMIN — LIDOCAINE HYDROCHLORIDE 60 MG: 20 INJECTION, SOLUTION EPIDURAL; INFILTRATION; INTRACAUDAL; PERINEURAL at 15:22

## 2018-08-22 NOTE — H&P
Foreign Vazquez M.D.  [phone]            History and Physical       NAME:  Joycelyn Frias   :   1940   MRN:   [id number]       Referring Physician:  Yesi Betancourt MD      Consult Date: 2018 12:02 AM    Chief Complaint:  Duodenal ulcer and esophagitis    History of Present Illness:   Mr. Kristy Knott is a 77 yo man with history of 3 \"mini strokes showed up on MRI\" and DM who presents for hospital discharge follow up after recently being admitted to [facility] for vomiting, dehydration in the setting of a duodenal ulcer with GOO seen on EGD by Dr. Belem García on . Also had severe esophagitis on EGD. Since discharge he is eating without difficulty. No further vomiting, dysphagia or odynophagia. Taking pantoprazole 40 mg twice daily and carafate 2 times daily. Takes ASA 81 mg daily. No other NSAIDs. No melena or hematochezia. PMH:  Past Medical History:   Diagnosis Date    BPH (benign prostatic hyperplasia)     Diabetes ([address]     HTN (hypertension)     Hyperlipidemia     Thyroid disease     Hypo thyroid    Type II or unspecified type diabetes mellitus without mention of complication, uncontrolled        PSH:  Past Surgical History:   Procedure Laterality Date    HX ORTHOPAEDIC  L shoulder repair    HX ORTHOPAEDIC      Left shoulder       Allergies:  No Known Allergies    Home Medications:  Cannot display prior to admission medications because the patient has not been admitted in this contact. Hospital Medications:  No current facility-administered medications for this encounter. Current Outpatient Prescriptions   Medication Sig    insulin detemir U-100 (LEVEMIR U-100 INSULIN) 100 unit/mL injection 30 Units by SubCUTAneous route daily.  pantoprazole (PROTONIX) 40 mg tablet Take 1 Tab by mouth Before breakfast and dinner.  escitalopram oxalate (LEXAPRO) 10 mg tablet Take 10 mg by mouth daily.     cyanocobalamin (VITAMIN B-12) 100 mcg tablet Take 100 mcg by mouth daily.  insulin aspart U-100 (NOVOLOG FLEXPEN U-100 INSULIN) 100 unit/mL inpn 8 Units by SubCUTAneous route Before breakfast, lunch, and dinner.  levothyroxine (SYNTHROID) 75 mcg tablet Take 75 mcg by mouth Daily (before breakfast).  glimepiride (AMARYL) 4 mg tablet TAKE 1 TABLET EVERY MORNING    atenolol (TENORMIN) 50 mg tablet Take 50 mg by mouth daily.  fenofibrate micronized (LOFIBRA) 134 mg capsule Take 134 mg by mouth daily.  lisinopril (PRINIVIL, ZESTRIL) 40 mg tablet Take 40 mg by mouth daily.  omega-3 fatty acids-vitamin e (FISH OIL) 1,000 mg cap Take 3 Caps by mouth daily.  aspirin delayed-release 81 mg tablet Take 81 mg by mouth daily.  terazosin (HYTRIN) 2 mg capsule Take 2 mg by mouth nightly. Social History:  Social History   Substance Use Topics    Smoking status: Former Smoker    Smokeless tobacco: Never Used      Comment: Smoked 50 years ago    Alcohol use No       Family History:  Family History   Problem Relation Age of Onset    Diabetes Mother     Cancer Mother     Diabetes Father     Diabetes Sister              Review of Systems:      Constitutional: negative fever, negative chills, negative weight loss  Eyes:   negative visual changes  ENT:   negative sore throat, tongue or lip swelling  Respiratory:  negative cough, negative dyspnea  Cards:  negative for chest pain, palpitations, lower extremity edema  GI:   See HPI  :  negative for frequency, dysuria  Integument:  negative for rash and pruritus  Heme:  negative for easy bruising and gum/nose bleeding  Musculoskel: negative for myalgias,  back pain and muscle weakness  Neuro: negative for headaches, dizziness, vertigo  Psych:  negative for feelings of anxiety, depression       Objective:   No data found.             EXAM:     NEURO-a&o   HEENT-wnl   LUNGS-clear    COR-regular rate and rhythym     ABD-soft , no tenderness, no rebound, good bs     EXT-no edema     Data Review     No results for input(s): WBC, HGB, HCT, PLT, HGBEXT, HCTEXT, PLTEXT in the last 72 hours. No results for input(s): NA, K, CL, CO2, BUN, CREA, GLU, PHOS, CA in the last 72 hours. No results for input(s): SGOT, GPT, AP, TBIL, TP, ALB, GLOB, GGT, AML, LPSE in the last 72 hours. No lab exists for component: AMYP, HLPSE  No results for input(s): INR, PTP, APTT in the last 72 hours. No lab exists for component: INREXT    Patient Active Problem List   Diagnosis Code    Type II or unspecified type diabetes mellitus without mention of complication, uncontrolled E11.65    Acquired hypothyroidism E03.9    Obesity E66.9    Hypothyroidism due to acquired atrophy of thyroid E03.4    Essential hypertension I10    Type 2 diabetes mellitus with diabetic nephropathy (HCC) E11.21    Encounter for long-term (current) use of insulin ([facility]) Z79.4    CVA (cerebral vascular accident) ([facility] [address] [address] I63.9    Hyperlipidemia E78.5    BPH (benign prostatic hyperplasia) N40.0    Nausea and vomiting R11.2    Epigastric abdominal pain R10.13    Acute on chronic renal failure (HCC) N17.9, N18.9    UTI (urinary tract infection) N39.0      Assessment:   · Duodenal ulcer  · esophagitis   Plan:   · EGD today.      Signed By: Jhoan Montero MD     8/22/2018  12:02 AM

## 2018-08-22 NOTE — PROGRESS NOTES
[facility]  1940  [id number]    Situation:  Verbal report received from: PARUL Alejandre rn  Procedure: Procedure(s):  ESOPHAGOGASTRODUODENOSCOPY (EGD)  ESOPHAGOGASTRODUODENAL (EGD) BIOPSY    Background:    Preoperative diagnosis: DUODENAL ULCER, ESOPHAGITIS   Postoperative diagnosis: Normal EGD    :  Dr. Shae Baum  Assistant(s): Endoscopy Technician-1: Johanna Manriquez  Endoscopy RN-1: Chito Jensen RN    Specimens:   ID Type Source Tests Collected by Time Destination   1 : Gastric Biopsies Preservative   Lexi Mcmillan MD 8/22/2018 1527 Pathology     H. Pylori  no    Assessment:  Intra-procedure medications   Anesthesia gave intra-procedure sedation and medications, see anesthesia flow sheet yes    Intravenous fluids: NS@ KVO     Vital signs stable     Abdominal assessment: round and soft     Recommendation:  Discharge patient per MD order. Family   Permission to share finding with family or friend yes  Endoscopy discharge instructions have been reviewed and given to patient. The patient verbalized understanding and acceptance of instructions.

## 2018-08-22 NOTE — DISCHARGE INSTRUCTIONS
Yudith Odonnell M.D.  [phone]           2018  Eran Dickey  :  1940  [facility] Medical Record Number:  [id number]        ENDOSCOPY FINDINGS:   Your endoscopy showed normal tissue. EGD DISCHARGE INSTRUCTIONS    DISCOMFORT:  Sore throat- throat lozenges or warm salt water gargle  redness at IV site- apply warm compress to area; if redness or soreness persist- contact your physician  Gaseous discomfort- walking, belching will help relieve any discomfort  You may not operate a vehicle for 12 hours  You may not engage in an occupation involving machinery or appliances for rest of today  You may not drink alcoholic beverages for at least 12 hours  Avoid making any critical decisions for at least 24 hour    DIET:   You may resume your regular diet. ACTIVITY  Spend the remainder of the day resting -  avoid any strenuous activity. Avoid driving or operating machinery. CALL M.D. ANY SIGN OF   Increasing pain, nausea, vomiting  Abdominal distension (swelling)  New increased bleeding (oral or rectal)  Fever (chills)  Pain in chest area  Bloody discharge from nose or mouth  Shortness of breath    Follow-up Instructions:   Call Dr. Jojo Granado for any questions or problems. Telephone # [phone]  If biopsies were obtained, the results will be available  in  5 to 7 days. We will call you to notify you of these results. Continue same medications. Follow up in the office.     Stop the carafate  Cut down the protonix to 40 mg once daily now

## 2018-08-22 NOTE — ANESTHESIA PREPROCEDURE EVALUATION
Anesthetic History   No history of anesthetic complications            Review of Systems / Medical History  Patient summary reviewed and pertinent labs reviewed    Pulmonary  Within defined limits                 Neuro/Psych       CVA (had 3 small CVA/TIA in June, has vision deficit 6/18)  TIA     Cardiovascular    Hypertension: well controlled              Exercise tolerance: <4 METS     GI/Hepatic/Renal             Pertinent negatives: PUD: ulcer dx 6/18.    Endo/Other    Diabetes: poorly controlled, using insulin  Hypothyroidism  Obesity     Other Findings            Physical Exam    Airway  Mallampati: II  TM Distance: 4 - 6 cm  Neck ROM: normal range of motion   Mouth opening: Normal     Cardiovascular    Rhythm: regular  Rate: normal         Dental    Dentition: Edentulous     Pulmonary  Breath sounds clear to auscultation               Abdominal  GI exam deferred       Other Findings            Anesthetic Plan    ASA: 3  Anesthesia type: MAC          Induction: Intravenous  Anesthetic plan and risks discussed with: Patient

## 2018-08-22 NOTE — PROCEDURES
Ramez Estrella M.D.  [phone]           2018                EGD Operative Report  Marv Garza  :  1940  [employer] Medical Record Number:  [id number]      Indication: history of duodenal ulcer and LA grade D esophagitis     : Rashi Bermudez MD    Referring Provider:  Bairon Lincoln MD      Anesthesia/Sedation:  MAC anesthesia Propofol    Airway assessment: No airway problems anticipated    Pre-Procedural Exam:      Airway: clear, no airway problems anticipated  Heart: RRR, without gallops or rubs  Lungs: clear bilaterally without wheezes, crackles, or rhonchi  Abdomen: soft, nontender, nondistended, bowel sounds present  Mental Status: awake, alert and oriented to person, place and time       Procedure Details     After infomed consent was obtained for the procedure, with all risks and benefits of procedure explained the patient was taken to the endoscopy suite and placed in the left lateral decubitus position. Following sequential administration of sedation as per above, the endoscope was inserted into the mouth and advanced under direct vision to second portion of the duodenum. A careful inspection was made as the gastroscope was withdrawn, including a retroflexed view of the proximal stomach; findings and interventions are described below. Findings:   Esophagus:normal - previously noted esophagitis has healed appropriately with no sequale like [facility]ford's noted on exam  Stomach: normal   Duodenum/jejunum: normal - the previously noted duodenal ulcer has healed also. The exam was advanced to the second portion of the duodenum    Therapies:  biopsy of stomach    Specimens: as above           Complications:   None; patient tolerated the procedure well. EBL:  None. Impression:    Normal EGD.  Biopsies obtained for histology and rule out H.pylori infection    Recommendations:    -Stop carafate  -Decrease protonix to 40 mg once daily  -Follow up with Dr. Cristal Wilhelm as previously scheduled    Maureen Cruz MD

## 2018-08-22 NOTE — ANESTHESIA POSTPROCEDURE EVALUATION
Post-Anesthesia Evaluation and Assessment    Patient: Nancy Rocha MRN: [id number]  SSN: [id number]    YOB: 1940  Age: 76 y.o. Sex: male       Cardiovascular Function/Vital Signs  Visit Vitals    /67    Pulse 61    Temp 36.4 °C (97.6 °F)    Resp 22    Ht 5' 11\" (1.803 m)    Wt 93.4 kg (206 lb)    SpO2 97%    BMI 28.73 kg/m2       Patient is status post MAC anesthesia for Procedure(s):  ESOPHAGOGASTRODUODENOSCOPY (EGD)  ESOPHAGOGASTRODUODENAL (EGD) BIOPSY. Nausea/Vomiting: None    Postoperative hydration reviewed and adequate. Pain:  Pain Scale 1: Numeric (0 - 10) (08/22/18 1547)  Pain Intensity 1: 0 (08/22/18 1547)   Managed    Neurological Status: At baseline    Mental Status and Level of Consciousness: Alert    Pulmonary Status:   O2 Device: Room air (08/22/18 1547)   Adequate oxygenation and airway patent    Complications related to anesthesia: None    Post-anesthesia assessment completed.  No concerns    Signed By: Alejandra Foreman MD     August 22, 2018

## 2018-08-22 NOTE — PERIOP NOTES
Report from Fatimah, [facility], see anesthesia record. ABD remains soft and non-tender post procedure. Pt has no complaints at this time and tolerated the procedure well. Endoscope was pre-cleaned at bedside immediately following procedure by Kirk Giordano.

## 2018-08-22 NOTE — IP AVS SNAPSHOT
[address] [facility] 
 
 
 [address] [facility] [address] [facility] 
[phone] Patient: Nida Coleman MRN: [id number] NDN:[id number] About your hospitalization You were admitted on:  August 22, 2018 You last received care in the:  [facility] ENDOSCOPY You were discharged on:  August 22, 2018 Why you were hospitalized Your primary diagnosis was:  Not on File Follow-up Information None Your Scheduled Appointments Thursday August 23, 2018  2:00 PM EDT ROUTINE CARE with Los Diaz MD  
Care Diabetes & Endocrinology [facility] [address] [place] [address] [address] [facility] [address]  
[phone] Discharge Orders None A check wale indicates which time of day the medication should be taken. My Medications ASK your doctor about these medications Instructions Each Dose to Equal  
 Morning Noon Evening Bedtime  
 aspirin delayed-release 81 mg tablet Your last dose was: Your next dose is: Take 81 mg by mouth daily. 81 mg  
    
   
   
   
  
 atenolol 50 mg tablet Commonly known as:  TENORMIN Your last dose was: Your next dose is: Take 50 mg by mouth daily. 50 mg  
    
   
   
   
  
 escitalopram oxalate 10 mg tablet Commonly known as:  Wyn Sonya Your last dose was: Your next dose is: Take 10 mg by mouth daily. 10 mg  
    
   
   
   
  
 fenofibrate micronized 134 mg capsule Commonly known as:  LOFIBRA Your last dose was: Your next dose is: Take 134 mg by mouth daily. 134 mg FISH OIL 1,000 mg Cap Generic drug:  omega-3 fatty acids-vitamin e Your last dose was: Your next dose is: Take 3 Caps by mouth daily. 3 Cap  
    
   
   
   
  
 glimepiride 4 mg tablet Commonly known as:  AMARYL Your last dose was: Your next dose is: TAKE 1 TABLET EVERY MORNING  
     
   
   
   
  
 insulin detemir U-100 100 unit/mL injection Commonly known as:  LEVEMIR U-100 INSULIN Your last dose was: Your next dose is:    
   
   
 30 Units by SubCUTAneous route daily. 30 Units  
    
   
   
   
  
 levothyroxine 75 mcg tablet Commonly known as:  SYNTHROID Your last dose was: Your next dose is: Take 75 mcg by mouth Daily (before breakfast). 75 mcg  
    
   
   
   
  
 lisinopril 40 mg tablet Commonly known as:  Nonah Ivonne Your last dose was: Your next dose is: Take 40 mg by mouth daily. 40 mg NovoLOG Flexpen U-100 Insulin 100 unit/mL Inpn Generic drug:  insulin aspart U-100 Your last dose was: Your next dose is:    
   
   
 8 Units by SubCUTAneous route Before breakfast, lunch, and dinner. 8 Units  
    
   
   
   
  
 pantoprazole 40 mg tablet Commonly known as:  PROTONIX Your last dose was: Your next dose is: Take 1 Tab by mouth Before breakfast and dinner. 40 mg  
    
   
   
   
  
 terazosin 2 mg capsule Commonly known as:  HYTRIN Your last dose was: Your next dose is: Take 2 mg by mouth nightly. 2 mg VITAMIN B-12 100 mcg tablet Generic drug:  cyanocobalamin Your last dose was: Your next dose is: Take 100 mcg by mouth daily. 100 mcg Discharge Instructions Dagoberto Simeon M.D. 
[phone] 
        
2018 Breanne Gonzalez :  1940 [address] [place] [address] Record Number:  [id number] ENDOSCOPY FINDINGS: 
 Your endoscopy showed normal tissue. EGD DISCHARGE INSTRUCTIONS DISCOMFORT: 
Sore throat- throat lozenges or warm salt water gargle redness at IV site- apply warm compress to area; if redness or soreness persist- contact your physician Gaseous discomfort- walking, belching will help relieve any discomfort You may not operate a vehicle for 12 hours You may not engage in an occupation involving machinery or appliances for rest of today You may not drink alcoholic beverages for at least 12 hours Avoid making any critical decisions for at least 24 hour DIET: 
 You may resume your regular diet. ACTIVITY Spend the remainder of the day resting -  avoid any strenuous activity. Avoid driving or operating machinery. CALL M.D. ANY SIGN OF Increasing pain, nausea, vomiting Abdominal distension (swelling) New increased bleeding (oral or rectal) Fever (chills) Pain in chest area Bloody discharge from nose or mouth Shortness of breath Follow-up Instructions: 
 Call Dr. Mercy Hardy for any questions or problems. Telephone # [phone] If biopsies were obtained, the results will be available  in  5 to 7 days. We will call you to notify you of these results. Continue same medications. Follow up in the office. Stop the carafate Cut down the protonix to 40 mg once daily now Introducing John Werner As a [employer] patient, I wanted to make you aware of our electronic visit tool called John Werner. [employer] 24/7 allows you to connect within minutes with a medical provider 24 hours a day, seven days a week via a mobile device or tablet or logging into a secure website from your computer. You can access John Werner from anywhere in the [place].  
 
A virtual visit might be right for you when you have a simple condition and feel like you just dont want to get out of bed, or cant get away from work for an appointment, when your regular [employer] provider is not available (evenings, weekends or holidays), or when youre out of town and need minor care. Electronic visits cost only $49 and if the [employer] 24/7 provider determines a prescription is needed to treat your condition, one can be electronically transmitted to a nearby pharmacy*. Please take a moment to enroll today if you have not already done so. The enrollment process is free and takes just a few minutes. To enroll, please download the [employer] 24/7 yue to your tablet or phone, or visit [email or web address] to enroll on your computer. And, as an [address] [place] [address] patient with a [employer] account, the results of your visits will be scanned into your electronic medical record and your primary care provider will be able to view the scanned results. We urge you to continue to see your regular [employer] provider for your ongoing medical care. And while your primary care provider may not be the one available when you seek a [employer] virtual visit, the peace of mind you get from getting a real diagnosis real time can be priceless. For more information on [employer], view our Frequently Asked Questions (FAQs) at [email or web address]. Sincerely, 
 
Estrella Sullivan MD 
Chief Medical Officer [address] *:  certain medications cannot be prescribed via [employer] Providers Seen During Your Hospitalization Provider Specialty Primary office phone Emmett Paz MD Gastroenterology [phone] Your Primary Care Physician (PCP) Primary Care Physician Office Phone Office Fax Wydheeraj Andrew [phone] You are allergic to the following No active allergies Recent Documentation Height Weight BMI Smoking Status 1.803 m 93.4 kg 28.73 kg/m2 Former Smoker Emergency Contacts Name Discharge Info Relation Home Work Mobile Daiana Martni DISCHARGE CAREGIVER [3] Spouse [3] [phone] Halle Foley DISCHARGE CAREGIVER [3] Child [2] [phone] Patient Belongings The following personal items are in your possession at time of discharge: 
  Dental Appliances: Lowers, At home, Uppers  Visual Aid: Glasses, With patient Please provide this summary of care documentation to your next provider. Signatures-by signing, you are acknowledging that this After Visit Summary has been reviewed with you and you have received a copy. Patient Signature:  ____________________________________________________________ Date:  ____________________________________________________________  
  
To Sport Provider Signature:  ____________________________________________________________ Date:  ____________________________________________________________

## 2019-07-25 ENCOUNTER — OFFICE VISIT (OUTPATIENT)
Dept: ENDOCRINOLOGY | Age: 79
End: 2019-07-25

## 2019-07-25 VITALS
BODY MASS INDEX: 30.9 KG/M2 | HEIGHT: 71 IN | WEIGHT: 220.7 LBS | DIASTOLIC BLOOD PRESSURE: 72 MMHG | TEMPERATURE: 97.4 F | SYSTOLIC BLOOD PRESSURE: 131 MMHG | HEART RATE: 70 BPM | RESPIRATION RATE: 18 BRPM | OXYGEN SATURATION: 94 %

## 2019-07-25 DIAGNOSIS — Z79.4 TYPE 2 DIABETES MELLITUS WITH DIABETIC NEPHROPATHY, WITH LONG-TERM CURRENT USE OF INSULIN (HCC): Primary | ICD-10-CM

## 2019-07-25 DIAGNOSIS — E11.21 TYPE 2 DIABETES MELLITUS WITH DIABETIC NEPHROPATHY, WITH LONG-TERM CURRENT USE OF INSULIN (HCC): Primary | ICD-10-CM

## 2019-07-25 DIAGNOSIS — Z79.4 ENCOUNTER FOR LONG-TERM (CURRENT) USE OF INSULIN (HCC): ICD-10-CM

## 2019-07-25 DIAGNOSIS — I10 ESSENTIAL HYPERTENSION: ICD-10-CM

## 2019-07-25 RX ORDER — INSULIN DEGLUDEC INJECTION 100 U/ML
60 INJECTION, SOLUTION SUBCUTANEOUS
COMMUNITY
Start: 2019-06-14

## 2019-07-25 NOTE — LETTER
7/25/19 Patient: Malgorzata Carlos YOB: 1940 Date of Visit: 7/25/2019 Sarah Peres MD 
[address] [facility] [address] VIA Facsimile: [phone] Dear Sarah Peres MD, Thank you for referring Mr. Malgorzata Carlos to [id number] [place] [address] for evaluation. My notes for this consultation are attached. If you have questions, please do not hesitate to call me. I look forward to following your patient along with you. Sincerely, Jose Leon MD

## 2019-07-25 NOTE — PROGRESS NOTES
Ana Carrero MD              Patient Information  Date:7/25/2019  Name : Gallo Horan [de-identified] y.o.     YOB: 1940         Referred by: Myriam Meehan MD         Chief Complaint   Patient presents with    Diabetes       History of Present Illness: Gallo Horan is a 74 y.o. male here for reestablishment of care for type 2 Diabetes Mellitus. He was referred by Dr. Omayra Peres. He is on MDI, has nephropathy, retinopathy. Metformin has been discontinued. He is on Holli Henry along with Humalog/NovoLog  Recently the dose of Holli Henry was adjusted. His A1c is more than 13, blood glucose have been ranging fasting more than 200, before lunch and dinner close to 300  Has polyuria.,  Weakness, tingling and numbness in the feet  He is taking care of his wife who has dementia, he is the sole caregiver for her. He can only do very little walking. Last one 1-1/2-year he started eating more carbs. He is with his daughter    Hypoglycemia:  None. Hx primary hypothyroidism, on replacement Levothyroxine. No chest pain, shortness of breath        Wt Readings from Last 3 Encounters:   07/25/19 220 lb 11.2 oz (100.1 kg)   08/22/18 206 lb (93.4 kg)   06/25/18 206 lb 2.1 oz (93.5 kg)       BP Readings from Last 3 Encounters:   07/25/19 131/72   08/22/18 135/63   06/26/18 145/72           Past Medical History:   Diagnosis Date    BPH (benign prostatic hyperplasia)     Diabetes ([facility] [address]     HTN (hypertension)     Hyperlipidemia     Thyroid disease     Hypo thyroid    Type II or unspecified type diabetes mellitus without mention of complication, uncontrolled      Current Outpatient Medications   Medication Sig    TRESIBA FLEXTOUCH U-100 100 unit/mL (3 mL) inpn 60 Units nightly.  escitalopram oxalate (LEXAPRO) 10 mg tablet Take 10 mg by mouth daily.  cyanocobalamin (VITAMIN B-12) 100 mcg tablet Take 100 mcg by mouth daily.     insulin aspart U-100 (NOVOLOG FLEXPEN U-100 INSULIN) 100 unit/mL inpn 10 Units by SubCUTAneous route Before breakfast, lunch, and dinner.  levothyroxine (SYNTHROID) 75 mcg tablet Take 75 mcg by mouth Daily (before breakfast).  fenofibrate micronized (LOFIBRA) 134 mg capsule Take 134 mg by mouth daily.  lisinopril (PRINIVIL, ZESTRIL) 40 mg tablet Take 40 mg by mouth daily.  omega-3 fatty acids-vitamin e (FISH OIL) 1,000 mg cap Take 3 Caps by mouth daily.  aspirin delayed-release 81 mg tablet Take 81 mg by mouth daily.  insulin detemir U-100 (LEVEMIR U-100 INSULIN) 100 unit/mL injection 30 Units by SubCUTAneous route daily.  pantoprazole (PROTONIX) 40 mg tablet Take 1 Tab by mouth Before breakfast and dinner.  glimepiride (AMARYL) 4 mg tablet TAKE 1 TABLET EVERY MORNING    atenolol (TENORMIN) 50 mg tablet Take 50 mg by mouth daily.  terazosin (HYTRIN) 2 mg capsule Take 2 mg by mouth nightly. No current facility-administered medications for this visit. No Known Allergies      Review of Systems:  - Review of all symptoms negative except as mentioned in HPI  -     Physical Examination:   Blood pressure 131/72, pulse 70, temperature 97.4 °F (36.3 °C), temperature source Oral, resp. rate 18, height 5' 11\" (1.803 m), weight 220 lb 11.2 oz (100.1 kg), SpO2 94 %. Estimated body mass index is 30.78 kg/m² as calculated from the following:    Height as of this encounter: 5' 11\" (1.803 m). -   Weight as of this encounter: 220 lb 11.2 oz (100.1 kg).   - General: pleasant, no distress, good eye contact  - HEENT: no pallor, no periorbital edema, EOMI  - Neck: supple, no thyromegaly  - Cardiovascular: regular, normal rate, normal S1 and S2  - Respiratory: clear to auscultation bilaterally  - Gastrointestinal: soft, nontender, nondistended,  BS +  - Musculoskeletal: no proximal muscle weakness in upper or lower extremities  - Extremities: Edema  - Neurological: Alert and oriented  - Skin: No rash    Diabetic foot exam: July 2019    Left:     Vibratory sensation absent   Filament test decreased sensation with micro filament   Pulse DP: 1+    Deformities: None  Right:    Vibratory sensation absent   Filament test decreased sensation with micro filament   Pulse DP: 1+   Deformities: None      -         Lab Results   Component Value Date/Time    Hemoglobin A1c 9.6 (H) 06/22/2018 01:33 AM    Hemoglobin A1c 9.4 (H) 06/20/2016 09:25 AM    Hemoglobin A1c 8.5 (H) 06/17/2015 10:30 AM    Glucose 134 (H) 06/25/2018 01:35 AM    Glucose (POC) 246 (H) 08/22/2018 01:14 PM    Microalb/Creat ratio (ug/mg creat.) 6.4 06/20/2016 09:25 AM    LDL,Direct 85 06/20/2016 09:25 AM    LDL, calculated 35.2 06/22/2018 01:33 AM    Creatinine (POC) 1.7 (H) 01/19/2018 03:19 PM    Creatinine 1.67 (H) 06/25/2018 01:35 AM    Hemoglobin A1c, External 10.7 05/30/2018    Hemoglobin A1c, External 8.6 02/26/2016      Lab Results   Component Value Date/Time    Cholesterol, total 107 06/22/2018 01:33 AM    HDL Cholesterol 37 06/22/2018 01:33 AM    LDL,Direct 85 06/20/2016 09:25 AM    LDL, calculated 35.2 06/22/2018 01:33 AM    Triglyceride 174 (H) 06/22/2018 01:33 AM    CHOL/HDL Ratio 2.9 06/22/2018 01:33 AM     Lab Results   Component Value Date/Time    ALT (SGPT) 23 06/21/2018 02:21 PM    AST (SGOT) 18 06/21/2018 02:21 PM    Alk.  phosphatase 68 06/21/2018 02:21 PM    Bilirubin, total 0.6 06/21/2018 02:21 PM     Lab Results   Component Value Date/Time    GFR est AA 48 (L) 06/25/2018 01:35 AM    GFR est non-AA 40 (L) 06/25/2018 01:35 AM    Creatinine (POC) 1.7 (H) 01/19/2018 03:19 PM    Creatinine 1.67 (H) 06/25/2018 01:35 AM    BUN 21 (H) 06/25/2018 01:35 AM    BUN (POC) 27 (H) 01/19/2018 03:19 PM    Sodium (POC) 139 01/19/2018 03:19 PM    Sodium 140 06/25/2018 01:35 AM    Potassium 3.3 (L) 06/25/2018 01:35 AM    Potassium (POC) 3.7 01/19/2018 03:19 PM    Chloride (POC) 101 01/19/2018 03:19 PM    Chloride 104 06/25/2018 01:35 AM    CO2 28 06/25/2018 01:35 AM Lab Results   Component Value Date/Time    TSH 2.31 06/22/2018 11:44 AM        Assessment/Plan:     1. Type 2 Diabetes Mellitus , nephropathy retinopathy,  Lab Results   Component Value Date/Time    Hemoglobin A1c 9.6 (H) 06/22/2018 01:33 AM    Hemoglobin A1c (POC) 6.8 11/18/2015 11:45 AM    Hemoglobin A1c, External 10.7 05/30/2018     A1c is more than 13, severe hyperglycemia, decrease in the pancreatic reserve with aging  Tresiba 60 units  Humalog 15-20 units  Check blood glucose at least three times daily. Discussed carbohydrate portions. FLU annually ,Pneumovax ,aspirin daily,annual eye exam,microalbumin  High risk for hypoglycemia given comorbidities    2. HTN : Continue current therapy     3. Primary hypothyroidism: on replacement levothyroxine. 4.Obesity:Body mass index is 30.78 kg/m². 5.  CKD/diabetic nephropathy  Discontinue fenofibrate if GFR is less than 30    Thank you for allowing me to participate in the care of this patient.     Aida Elam MD    Patient verbalized understanding

## 2019-07-25 NOTE — PATIENT INSTRUCTIONS
Check glucose before each meal     Tresiba 60  units at  9 PM     Humalog 15 units before meals for 1 week and if sugars are still high increase to 20 units before meals , if sugars are less than 70 No Humalog        If not eating a meal and if sugars are high then use the scale below for Humalog insulin     Blood sugar  Fast acting insulin Breakfast/Lunch/Dinner        150-200  Add  3  Units       201-250  Add 6 Units       251-300  Add 9 Units       301-350  Add 12  Units        351-400  Add 15  Units

## 2019-07-26 RX ORDER — INSULIN ASPART 100 [IU]/ML
15-20 INJECTION, SOLUTION INTRAVENOUS; SUBCUTANEOUS
Qty: 30 ML | Refills: 11 | Status: ON HOLD | OUTPATIENT
Start: 2019-07-26 | End: 2019-09-10

## 2019-07-31 DIAGNOSIS — Z79.4 TYPE 2 DIABETES MELLITUS WITH DIABETIC NEPHROPATHY, WITH LONG-TERM CURRENT USE OF INSULIN (HCC): Primary | ICD-10-CM

## 2019-07-31 DIAGNOSIS — E11.21 TYPE 2 DIABETES MELLITUS WITH DIABETIC NEPHROPATHY, WITH LONG-TERM CURRENT USE OF INSULIN (HCC): Primary | ICD-10-CM

## 2019-07-31 RX ORDER — BLOOD-GLUCOSE METER
EACH MISCELLANEOUS
Qty: 1 EACH | Refills: 0 | Status: ON HOLD | OUTPATIENT
Start: 2019-07-31 | End: 2019-09-10

## 2019-07-31 RX ORDER — LANCETS
EACH MISCELLANEOUS
Qty: 300 EACH | Refills: 3 | Status: ON HOLD | OUTPATIENT
Start: 2019-07-31 | End: 2019-09-10

## 2019-09-10 ENCOUNTER — APPOINTMENT (OUTPATIENT)
Dept: NON INVASIVE DIAGNOSTICS | Age: 79
DRG: 066 | End: 2019-09-10
Attending: INTERNAL MEDICINE
Payer: MEDICARE

## 2019-09-10 ENCOUNTER — HOSPITAL ENCOUNTER (INPATIENT)
Age: 79
LOS: 1 days | Discharge: HOME | DRG: 066 | End: 2019-09-11
Attending: STUDENT IN AN ORGANIZED HEALTH CARE EDUCATION/TRAINING PROGRAM | Admitting: INTERNAL MEDICINE
Payer: MEDICARE

## 2019-09-10 ENCOUNTER — APPOINTMENT (OUTPATIENT)
Dept: CT IMAGING | Age: 79
DRG: 066 | End: 2019-09-10
Attending: STUDENT IN AN ORGANIZED HEALTH CARE EDUCATION/TRAINING PROGRAM
Payer: MEDICARE

## 2019-09-10 ENCOUNTER — APPOINTMENT (OUTPATIENT)
Dept: MRI IMAGING | Age: 79
DRG: 066 | End: 2019-09-10
Attending: INTERNAL MEDICINE
Payer: MEDICARE

## 2019-09-10 DIAGNOSIS — G45.9 TIA (TRANSIENT ISCHEMIC ATTACK): Primary | ICD-10-CM

## 2019-09-10 DIAGNOSIS — I63.89 CEREBROVASCULAR ACCIDENT (CVA) DUE TO OTHER MECHANISM (HCC): ICD-10-CM

## 2019-09-10 LAB
ALBUMIN SERPL-MCNC: 3.6 G/DL (ref 3.5–5)
ALBUMIN/GLOB SERPL: 1.1 {RATIO} (ref 1.1–2.2)
ALP SERPL-CCNC: 65 U/L (ref 45–117)
ALT SERPL-CCNC: 24 U/L (ref 12–78)
ANION GAP BLD CALC-SCNC: 15 MMOL/L (ref 10–20)
ANION GAP SERPL CALC-SCNC: 7 MMOL/L (ref 5–15)
AST SERPL-CCNC: 16 U/L (ref 15–37)
ATRIAL RATE: 75 BPM
BASOPHILS # BLD: 0.1 K/UL (ref 0–0.1)
BASOPHILS NFR BLD: 1 % (ref 0–1)
BILIRUB SERPL-MCNC: 0.3 MG/DL (ref 0.2–1)
BUN BLD-MCNC: 36 MG/DL (ref 9–20)
BUN SERPL-MCNC: 31 MG/DL (ref 6–20)
BUN/CREAT SERPL: 15 (ref 12–20)
CA-I BLD-MCNC: 1.15 MMOL/L (ref 1.12–1.32)
CALCIUM SERPL-MCNC: 8.5 MG/DL (ref 8.5–10.1)
CALCULATED P AXIS, ECG09: 58 DEGREES
CALCULATED R AXIS, ECG10: -4 DEGREES
CALCULATED T AXIS, ECG11: 36 DEGREES
CHLORIDE BLD-SCNC: 99 MMOL/L (ref 98–107)
CHLORIDE SERPL-SCNC: 103 MMOL/L (ref 97–108)
CHOLEST SERPL-MCNC: 139 MG/DL
CO2 BLD-SCNC: 24 MMOL/L (ref 21–32)
CO2 SERPL-SCNC: 25 MMOL/L (ref 21–32)
COMMENT, HOLDF: NORMAL
CREAT BLD-MCNC: 2 MG/DL (ref 0.6–1.3)
CREAT SERPL-MCNC: 2.06 MG/DL (ref 0.7–1.3)
DIAGNOSIS, 93000: NORMAL
DIFFERENTIAL METHOD BLD: ABNORMAL
ECHO AO ROOT DIAM: 4.07 CM
ECHO AV AREA PEAK VELOCITY: 2.1 CM2
ECHO AV PEAK GRADIENT: 6.5 MMHG
ECHO AV PEAK VELOCITY: 127.06 CM/S
ECHO EST RA PRESSURE: 3 MMHG
ECHO LA MAJOR AXIS: 3.35 CM
ECHO LA TO AORTIC ROOT RATIO: 0.82
ECHO LA VOL 2C: 61.6 ML (ref 18–58)
ECHO LA VOL 4C: 73 ML (ref 18–58)
ECHO LA VOL BP: 74.64 ML (ref 18–58)
ECHO LA VOL/BSA BIPLANE: 33.99 ML/M2 (ref 16–28)
ECHO LA VOLUME INDEX A2C: 28.05 ML/M2 (ref 16–28)
ECHO LA VOLUME INDEX A4C: 33.24 ML/M2 (ref 16–28)
ECHO LV INTERNAL DIMENSION DIASTOLIC: 4.62 CM (ref 4.2–5.9)
ECHO LV INTERNAL DIMENSION SYSTOLIC: 3.36 CM
ECHO LV IVSD: 0.91 CM (ref 0.6–1)
ECHO LV MASS 2D: 165.7 G (ref 88–224)
ECHO LV MASS INDEX 2D: 75.5 G/M2 (ref 49–115)
ECHO LV POSTERIOR WALL DIASTOLIC: 0.94 CM (ref 0.6–1)
ECHO LVOT DIAM: 1.98 CM
ECHO LVOT PEAK GRADIENT: 3 MMHG
ECHO LVOT PEAK VELOCITY: 86.04 CM/S
ECHO MV A VELOCITY: 100.5 CM/S
ECHO MV E DECELERATION TIME (DT): 260.8 MS
ECHO MV E VELOCITY: 68.6 CM/S
ECHO MV E/A RATIO: 0.68
ECHO MV REGURGITANT PEAK GRADIENT: 53.8 MMHG
ECHO MV REGURGITANT PEAK VELOCITY: 366.61 CM/S
ECHO PULMONARY ARTERY SYSTOLIC PRESSURE (PASP): 29.7 MMHG
ECHO PV MAX VELOCITY: 106.45 CM/S
ECHO PV PEAK GRADIENT: 4.5 MMHG
ECHO RIGHT VENTRICULAR SYSTOLIC PRESSURE (RVSP): 29.7 MMHG
ECHO RV INTERNAL DIMENSION: 3.36 CM
ECHO TV REGURGITANT MAX VELOCITY: 258.39 CM/S
ECHO TV REGURGITANT PEAK GRADIENT: 26.7 MMHG
EOSINOPHIL # BLD: 0.4 K/UL (ref 0–0.4)
EOSINOPHIL NFR BLD: 3 % (ref 0–7)
ERYTHROCYTE [DISTWIDTH] IN BLOOD BY AUTOMATED COUNT: 13.8 % (ref 11.5–14.5)
EST. AVERAGE GLUCOSE BLD GHB EST-MCNC: 289 MG/DL
GLOBULIN SER CALC-MCNC: 3.3 G/DL (ref 2–4)
GLUCOSE BLD STRIP.AUTO-MCNC: 158 MG/DL (ref 65–100)
GLUCOSE BLD STRIP.AUTO-MCNC: 224 MG/DL (ref 65–100)
GLUCOSE BLD STRIP.AUTO-MCNC: 274 MG/DL (ref 65–100)
GLUCOSE BLD STRIP.AUTO-MCNC: 347 MG/DL (ref 65–100)
GLUCOSE BLD STRIP.AUTO-MCNC: 418 MG/DL (ref 65–100)
GLUCOSE BLD STRIP.AUTO-MCNC: 92 MG/DL (ref 65–100)
GLUCOSE BLD-MCNC: 438 MG/DL (ref 65–100)
GLUCOSE SERPL-MCNC: 426 MG/DL (ref 65–100)
HBA1C MFR BLD: 11.7 % (ref 4.2–6.3)
HCT VFR BLD AUTO: 37.4 % (ref 36.6–50.3)
HCT VFR BLD CALC: 37 % (ref 36.6–50.3)
HDLC SERPL-MCNC: 39 MG/DL
HDLC SERPL: 3.6 {RATIO} (ref 0–5)
HGB BLD-MCNC: 12 G/DL (ref 12.1–17)
IMM GRANULOCYTES # BLD AUTO: 0.1 K/UL (ref 0–0.04)
IMM GRANULOCYTES NFR BLD AUTO: 1 % (ref 0–0.5)
INR PPP: 1.1 (ref 0.9–1.1)
LDLC SERPL CALC-MCNC: 61.4 MG/DL (ref 0–100)
LIPID PROFILE,FLP: ABNORMAL
LYMPHOCYTES # BLD: 2.9 K/UL (ref 0.8–3.5)
LYMPHOCYTES NFR BLD: 25 % (ref 12–49)
MCH RBC QN AUTO: 27.6 PG (ref 26–34)
MCHC RBC AUTO-ENTMCNC: 32.1 G/DL (ref 30–36.5)
MCV RBC AUTO: 86 FL (ref 80–99)
MONOCYTES # BLD: 0.7 K/UL (ref 0–1)
MONOCYTES NFR BLD: 6 % (ref 5–13)
NEUTS SEG # BLD: 7.5 K/UL (ref 1.8–8)
NEUTS SEG NFR BLD: 64 % (ref 32–75)
NRBC # BLD: 0 K/UL (ref 0–0.01)
NRBC BLD-RTO: 0 PER 100 WBC
P-R INTERVAL, ECG05: 184 MS
PLATELET # BLD AUTO: 307 K/UL (ref 150–400)
PMV BLD AUTO: 11.7 FL (ref 8.9–12.9)
POTASSIUM BLD-SCNC: 4.4 MMOL/L (ref 3.5–5.1)
POTASSIUM SERPL-SCNC: 4.2 MMOL/L (ref 3.5–5.1)
PROT SERPL-MCNC: 6.9 G/DL (ref 6.4–8.2)
PROTHROMBIN TIME: 10.9 SEC (ref 9–11.1)
Q-T INTERVAL, ECG07: 408 MS
QRS DURATION, ECG06: 96 MS
QTC CALCULATION (BEZET), ECG08: 455 MS
RBC # BLD AUTO: 4.35 M/UL (ref 4.1–5.7)
SAMPLES BEING HELD,HOLD: NORMAL
SERVICE CMNT-IMP: ABNORMAL
SERVICE CMNT-IMP: NORMAL
SODIUM BLD-SCNC: 132 MMOL/L (ref 136–145)
SODIUM SERPL-SCNC: 135 MMOL/L (ref 136–145)
TRIGL SERPL-MCNC: 193 MG/DL (ref ?–150)
TSH SERPL DL<=0.05 MIU/L-ACNC: 2.57 UIU/ML (ref 0.36–3.74)
VENTRICULAR RATE, ECG03: 75 BPM
VLDLC SERPL CALC-MCNC: 38.6 MG/DL
WBC # BLD AUTO: 11.5 K/UL (ref 4.1–11.1)

## 2019-09-10 PROCEDURE — 36415 COLL VENOUS BLD VENIPUNCTURE: CPT

## 2019-09-10 PROCEDURE — 83036 HEMOGLOBIN GLYCOSYLATED A1C: CPT

## 2019-09-10 PROCEDURE — 70496 CT ANGIOGRAPHY HEAD: CPT

## 2019-09-10 PROCEDURE — 97165 OT EVAL LOW COMPLEX 30 MIN: CPT

## 2019-09-10 PROCEDURE — 94762 N-INVAS EAR/PLS OXIMTRY CONT: CPT

## 2019-09-10 PROCEDURE — 99284 EMERGENCY DEPT VISIT MOD MDM: CPT

## 2019-09-10 PROCEDURE — 77030040361 HC SLV COMPR DVT MDII -B

## 2019-09-10 PROCEDURE — 85610 PROTHROMBIN TIME: CPT

## 2019-09-10 PROCEDURE — 85025 COMPLETE CBC W/AUTO DIFF WBC: CPT

## 2019-09-10 PROCEDURE — 74011636637 HC RX REV CODE- 636/637: Performed by: INTERNAL MEDICINE

## 2019-09-10 PROCEDURE — 82962 GLUCOSE BLOOD TEST: CPT

## 2019-09-10 PROCEDURE — 80053 COMPREHEN METABOLIC PANEL: CPT

## 2019-09-10 PROCEDURE — 93005 ELECTROCARDIOGRAM TRACING: CPT

## 2019-09-10 PROCEDURE — 74011250637 HC RX REV CODE- 250/637: Performed by: NURSE PRACTITIONER

## 2019-09-10 PROCEDURE — 74011636320 HC RX REV CODE- 636/320: Performed by: RADIOLOGY

## 2019-09-10 PROCEDURE — 74011250636 HC RX REV CODE- 250/636: Performed by: INTERNAL MEDICINE

## 2019-09-10 PROCEDURE — 80047 BASIC METABLC PNL IONIZED CA: CPT

## 2019-09-10 PROCEDURE — 97116 GAIT TRAINING THERAPY: CPT

## 2019-09-10 PROCEDURE — 97161 PT EVAL LOW COMPLEX 20 MIN: CPT

## 2019-09-10 PROCEDURE — 80061 LIPID PANEL: CPT

## 2019-09-10 PROCEDURE — 97535 SELF CARE MNGMENT TRAINING: CPT

## 2019-09-10 PROCEDURE — 70450 CT HEAD/BRAIN W/O DYE: CPT

## 2019-09-10 PROCEDURE — 84443 ASSAY THYROID STIM HORMONE: CPT

## 2019-09-10 PROCEDURE — 74011250637 HC RX REV CODE- 250/637: Performed by: INTERNAL MEDICINE

## 2019-09-10 PROCEDURE — 65660000000 HC RM CCU STEPDOWN

## 2019-09-10 PROCEDURE — 92610 EVALUATE SWALLOWING FUNCTION: CPT

## 2019-09-10 PROCEDURE — 93306 TTE W/DOPPLER COMPLETE: CPT

## 2019-09-10 PROCEDURE — 70551 MRI BRAIN STEM W/O DYE: CPT

## 2019-09-10 PROCEDURE — 94760 N-INVAS EAR/PLS OXIMETRY 1: CPT

## 2019-09-10 RX ORDER — SODIUM CHLORIDE 0.9 % (FLUSH) 0.9 %
5-40 SYRINGE (ML) INJECTION AS NEEDED
Status: DISCONTINUED | OUTPATIENT
Start: 2019-09-10 | End: 2019-09-11 | Stop reason: HOSPADM

## 2019-09-10 RX ORDER — INSULIN LISPRO 100 [IU]/ML
INJECTION, SOLUTION INTRAVENOUS; SUBCUTANEOUS
Status: DISCONTINUED | OUTPATIENT
Start: 2019-09-10 | End: 2019-09-11 | Stop reason: HOSPADM

## 2019-09-10 RX ORDER — LISINOPRIL 20 MG/1
40 TABLET ORAL DAILY
Status: DISCONTINUED | OUTPATIENT
Start: 2019-09-10 | End: 2019-09-11 | Stop reason: HOSPADM

## 2019-09-10 RX ORDER — HYDROMORPHONE HYDROCHLORIDE 1 MG/ML
0.5 INJECTION, SOLUTION INTRAMUSCULAR; INTRAVENOUS; SUBCUTANEOUS
Status: DISCONTINUED | OUTPATIENT
Start: 2019-09-10 | End: 2019-09-11 | Stop reason: HOSPADM

## 2019-09-10 RX ORDER — LEVOTHYROXINE SODIUM 100 UG/1
100 TABLET ORAL
Status: DISCONTINUED | OUTPATIENT
Start: 2019-09-11 | End: 2019-09-11 | Stop reason: HOSPADM

## 2019-09-10 RX ORDER — INSULIN GLARGINE 100 [IU]/ML
40 INJECTION, SOLUTION SUBCUTANEOUS
Status: DISCONTINUED | OUTPATIENT
Start: 2019-09-10 | End: 2019-09-11 | Stop reason: HOSPADM

## 2019-09-10 RX ORDER — SODIUM CHLORIDE 0.9 % (FLUSH) 0.9 %
5-40 SYRINGE (ML) INJECTION EVERY 8 HOURS
Status: DISCONTINUED | OUTPATIENT
Start: 2019-09-10 | End: 2019-09-11 | Stop reason: HOSPADM

## 2019-09-10 RX ORDER — SODIUM CHLORIDE 9 MG/ML
50 INJECTION, SOLUTION INTRAVENOUS CONTINUOUS
Status: DISCONTINUED | OUTPATIENT
Start: 2019-09-10 | End: 2019-09-11 | Stop reason: HOSPADM

## 2019-09-10 RX ORDER — HYDROCODONE BITARTRATE AND ACETAMINOPHEN 5; 325 MG/1; MG/1
1 TABLET ORAL
Status: DISCONTINUED | OUTPATIENT
Start: 2019-09-10 | End: 2019-09-11 | Stop reason: HOSPADM

## 2019-09-10 RX ORDER — ESCITALOPRAM OXALATE 10 MG/1
10 TABLET ORAL DAILY
Status: DISCONTINUED | OUTPATIENT
Start: 2019-09-10 | End: 2019-09-11 | Stop reason: HOSPADM

## 2019-09-10 RX ORDER — DIPHENHYDRAMINE HYDROCHLORIDE 50 MG/ML
12.5 INJECTION, SOLUTION INTRAMUSCULAR; INTRAVENOUS
Status: DISCONTINUED | OUTPATIENT
Start: 2019-09-10 | End: 2019-09-11 | Stop reason: HOSPADM

## 2019-09-10 RX ORDER — INSULIN ASPART 100 [IU]/ML
15 INJECTION, SOLUTION INTRAVENOUS; SUBCUTANEOUS
COMMUNITY

## 2019-09-10 RX ORDER — ASPIRIN 81 MG/1
81 TABLET ORAL DAILY
Status: DISCONTINUED | OUTPATIENT
Start: 2019-09-10 | End: 2019-09-10

## 2019-09-10 RX ORDER — NALOXONE HYDROCHLORIDE 0.4 MG/ML
0.4 INJECTION, SOLUTION INTRAMUSCULAR; INTRAVENOUS; SUBCUTANEOUS AS NEEDED
Status: DISCONTINUED | OUTPATIENT
Start: 2019-09-10 | End: 2019-09-11 | Stop reason: HOSPADM

## 2019-09-10 RX ORDER — ONDANSETRON 2 MG/ML
4 INJECTION INTRAMUSCULAR; INTRAVENOUS
Status: DISCONTINUED | OUTPATIENT
Start: 2019-09-10 | End: 2019-09-11 | Stop reason: HOSPADM

## 2019-09-10 RX ORDER — INSULIN LISPRO 100 [IU]/ML
INJECTION, SOLUTION INTRAVENOUS; SUBCUTANEOUS
Status: DISCONTINUED | OUTPATIENT
Start: 2019-09-10 | End: 2019-09-10

## 2019-09-10 RX ORDER — ACETAMINOPHEN 325 MG/1
650 TABLET ORAL
Status: DISCONTINUED | OUTPATIENT
Start: 2019-09-10 | End: 2019-09-11 | Stop reason: HOSPADM

## 2019-09-10 RX ORDER — MAGNESIUM SULFATE 100 %
4 CRYSTALS MISCELLANEOUS AS NEEDED
Status: DISCONTINUED | OUTPATIENT
Start: 2019-09-10 | End: 2019-09-11 | Stop reason: HOSPADM

## 2019-09-10 RX ORDER — LEVOTHYROXINE SODIUM 75 UG/1
75 TABLET ORAL
Status: DISCONTINUED | OUTPATIENT
Start: 2019-09-10 | End: 2019-09-10

## 2019-09-10 RX ORDER — DOCUSATE SODIUM 100 MG/1
100 CAPSULE, LIQUID FILLED ORAL 2 TIMES DAILY
Status: DISCONTINUED | OUTPATIENT
Start: 2019-09-10 | End: 2019-09-11 | Stop reason: HOSPADM

## 2019-09-10 RX ORDER — DEXTROSE MONOHYDRATE 100 MG/ML
0-250 INJECTION, SOLUTION INTRAVENOUS AS NEEDED
Status: DISCONTINUED | OUTPATIENT
Start: 2019-09-10 | End: 2019-09-11 | Stop reason: HOSPADM

## 2019-09-10 RX ORDER — FENOFIBRATE 48 MG/1
96 TABLET, COATED ORAL DAILY
Status: DISCONTINUED | OUTPATIENT
Start: 2019-09-10 | End: 2019-09-11 | Stop reason: HOSPADM

## 2019-09-10 RX ORDER — CLOPIDOGREL BISULFATE 75 MG/1
75 TABLET ORAL DAILY
Status: DISCONTINUED | OUTPATIENT
Start: 2019-09-10 | End: 2019-09-11 | Stop reason: HOSPADM

## 2019-09-10 RX ADMIN — INSULIN LISPRO 2 UNITS: 100 INJECTION, SOLUTION INTRAVENOUS; SUBCUTANEOUS at 08:31

## 2019-09-10 RX ADMIN — Medication 10 ML: at 14:00

## 2019-09-10 RX ADMIN — LISINOPRIL 40 MG: 20 TABLET ORAL at 08:33

## 2019-09-10 RX ADMIN — INSULIN LISPRO 5 UNITS: 100 INJECTION, SOLUTION INTRAVENOUS; SUBCUTANEOUS at 17:21

## 2019-09-10 RX ADMIN — IOPAMIDOL 90 ML: 755 INJECTION, SOLUTION INTRAVENOUS at 00:55

## 2019-09-10 RX ADMIN — Medication 10 ML: at 22:32

## 2019-09-10 RX ADMIN — FENOFIBRATE 96 MG: 48 TABLET ORAL at 08:33

## 2019-09-10 RX ADMIN — DOCUSATE SODIUM 100 MG: 100 CAPSULE, LIQUID FILLED ORAL at 08:33

## 2019-09-10 RX ADMIN — CLOPIDOGREL BISULFATE 75 MG: 75 TABLET ORAL at 10:27

## 2019-09-10 RX ADMIN — INSULIN GLARGINE 40 UNITS: 100 INJECTION, SOLUTION SUBCUTANEOUS at 22:31

## 2019-09-10 RX ADMIN — SODIUM CHLORIDE 50 ML/HR: 900 INJECTION, SOLUTION INTRAVENOUS at 01:56

## 2019-09-10 RX ADMIN — ASPIRIN 81 MG: 81 TABLET, COATED ORAL at 08:33

## 2019-09-10 RX ADMIN — INSULIN GLARGINE 40 UNITS: 100 INJECTION, SOLUTION SUBCUTANEOUS at 01:55

## 2019-09-10 RX ADMIN — INSULIN LISPRO 2 UNITS: 100 INJECTION, SOLUTION INTRAVENOUS; SUBCUTANEOUS at 22:31

## 2019-09-10 RX ADMIN — INSULIN LISPRO 4 UNITS: 100 INJECTION, SOLUTION INTRAVENOUS; SUBCUTANEOUS at 01:55

## 2019-09-10 RX ADMIN — ESCITALOPRAM OXALATE 10 MG: 10 TABLET ORAL at 08:33

## 2019-09-10 RX ADMIN — DOCUSATE SODIUM 100 MG: 100 CAPSULE, LIQUID FILLED ORAL at 17:21

## 2019-09-10 RX ADMIN — SODIUM CHLORIDE 50 ML/HR: 900 INJECTION, SOLUTION INTRAVENOUS at 14:40

## 2019-09-10 NOTE — PROGRESS NOTES
PHYSICAL THERAPY EVALUATION/DISCHARGE  Patient: Shashi Schumacher (81 y.o. male)  Date: 9/10/2019  Primary Diagnosis: CVA (cerebral vascular accident) [facility]) [I63.9]       Precautions: hx of left hemianopsia from occipital stroke 2018          ASSESSMENT  Based on the objective data described below, the patient presents with independent with ambulation without assistive device as well as up and down stairs and independent with all functional mobility. Patient had history of right occipital stroke with left hemianopsia 6/2018. Patient reports of having vertigo and was seeing a specialist for that. Patient at his base line level of function still takes care of his spouse at home with the help of daughter and neighbors. Reviewed sign and symptoms of stroke with the patient and verbalized understanding. Reviewed all safety precaution and home exercise program with the patient, verbalized understanding. Skilled physical therapy is not indicated at this time. .    Functional Outcome Measure: The patient scored 50/56 on the marshall balance test outcome measure which is indicative of low fall risk. Other factors to consider for discharge: none     Further skilled acute physical therapy is not indicated at this time. PLAN :  Recommendation for discharge: (in order for the patient to meet his/her long term goals)  No skilled physical therapy/ follow up rehabilitation needs identified at this time. This discharge recommendation:  Has been made in collaboration with the attending provider and/or case management    Equipment recommendations for successful discharge: patient owns DME required for discharge         SUBJECTIVE:   Patient stated Dayday Hernandez feel better now, I'm unsteady at baseline from my previous stroke.     OBJECTIVE DATA SUMMARY:   HISTORY:    Past Medical History:   Diagnosis Date    BPH (benign prostatic hyperplasia)     Diabetes ([address]     HTN (hypertension)     Hyperlipidemia     Thyroid disease     Hypo thyroid    Type II or unspecified type diabetes mellitus without mention of complication, uncontrolled      Past Surgical History:   Procedure Laterality Date    HX ENDOSCOPY      HX ORTHOPAEDIC      Left shoulder       Prior level of function: see above notes  Personal factors and/or comorbidities impacting plan of care:     Home Situation  Home Environment: Private residence  # Steps to Enter: 3  One/Two Story Residence: One story  Living Alone: No  Support Systems: Family member(s)  Patient Expects to be Discharged to[de-identified] Private residence  Current DME Used/Available at Home: None    EXAMINATION/PRESENTATION/DECISION MAKING:   Critical Behavior:  Neurologic State: Alert  Orientation Level: Oriented X4  Cognition: Appropriate decision making, Appropriate for age attention/concentration, Appropriate safety awareness, Follows commands     Hearing: Auditory  Auditory Impairment: None    Range Of Motion:  AROM: Within functional limits           PROM: Within functional limits           Strength:    Strength: Within functional limits                    Tone & Sensation:                                  Coordination:  Coordination: Within functional limits  Vision:      Functional Mobility:  Bed Mobility:  Rolling: Independent  Supine to Sit: Independent  Sit to Supine: Independent  Scooting: Independent  Transfers:  Sit to Stand: Independent  Stand to Sit: Independent  Stand Pivot Transfers: Independent     Bed to Chair: Independent              Balance:   Sitting: Intact  Standing: Intact; Without support  Ambulation/Gait Training:  Distance (ft): 200 Feet (ft)     Ambulation - Level of Assistance: Independent     Gait Description (WDL): Within defined limits  Gait Abnormalities: Path deviations              Speed/Florinda: Pace decreased (<100 feet/min)                        Stairs:  Number of Stairs Trained: 4  Stairs - Level of Assistance: Modified independent   Rail Use: Both    Therapeutic Exercises: Instructed patient to continue active range of motion exercise on both legs while up on chair or on bed. Functional Measure  Marshall Balance Test:    Sitting to Standin  Standing Unsupported: 4  Sitting with Back Unsupported: 4  Standing to Sittin  Transfers: 4  Standing Unsupported with Eyes Closed: 4  Standing Unsupported with Feet Together: 4  Reach Forward with Outstretched Arm: 3   Object: 4  Turn to Look Over Shoulders: 4  Turn 360 Degrees: 3  Alternate Foot on Step/Stool: 2  Standing Unsupported One Foot in Front: 3  Stand on One Leg: 3  Total: 50/56         56=Maximum possible score;   0-20=High fall risk  21-40=Moderate fall risk   41-56=Low fall risk        Physical Therapy Evaluation Charge Determination   History Examination Presentation Decision-Making   LOW Complexity : Zero comorbidities / personal factors that will impact the outcome / POC LOW Complexity : 1-2 Standardized tests and measures addressing body structure, function, activity limitation and / or participation in recreation  LOW Complexity : Stable, uncomplicated  Other outcome measures marshall balance test  LOW       Based on the above components, the patient evaluation is determined to be of the following complexity level: LOW     Pain Ratin/10    Activity Tolerance: WNL  Please refer to the flowsheet for vital signs taken during this treatment. After treatment patient left in no apparent distress:   Sitting on bed, Call bell within reach and Side rails x 3    COMMUNICATION/EDUCATION:   The patients plan of care was discussed with: Registered Nurse and . Patient was educated regarding his deficit(s) of vision impairment as this relates to his diagnosis of TIA. He demonstrated Excellent understanding as evidenced by recall. Patient and/or family was verbally educated on the BE FAST acronym for signs/symptoms of CVA and TIA.  Informed patient to refer to the Stroke Binder for further BE FAST information. All questions answered with patient indicating good understanding. Fall prevention education was provided and the patient/caregiver indicated understanding. Thank you for this referral.  Melony Javier PT,WCC.    Time Calculation: 27 mins

## 2019-09-10 NOTE — PROGRESS NOTES
[facility]: MED RECONCILIATION    Comments/Recommendations:   Interview with patient and family member at the bedside. Verifies [facility] and preferred pharmacy listed. Pharmacist reviewed prescription refill history with Rx Query. Medications added:     None    Medications removed:    None    Medications adjusted:    Levothyroxine changed to 100 mcg daily  Novolog changed to 15 units ac meals  Fish oil changed to 1 cap TID    Allergies: Patient has no known allergies. Prior to Admission Medications:   Prior to Admission Medications   Prescriptions Last Dose Informant Patient Reported? Taking? TRESIBA FLEXTOUCH U-100 100 unit/mL (3 mL) inpn 2019 Family Member Yes Yes   Si Units by SubCUTAneous route nightly. aspirin delayed-release 81 mg tablet 2019 at Unknown time Family Member Yes Yes   Sig: Take 81 mg by mouth nightly. cyanocobalamin (VITAMIN B-12) 100 mcg tablet 2019 Family Member Yes Yes   Sig: Take 100 mcg by mouth nightly. escitalopram oxalate (LEXAPRO) 10 mg tablet 2019 at Unknown time Family Member Yes Yes   Sig: Take 10 mg by mouth daily. fenofibrate micronized (LOFIBRA) 134 mg capsule 2019 Family Member Yes Yes   Sig: Take 134 mg by mouth nightly. insulin aspart U-100 (NOVOLOG) 100 unit/mL (3 mL) inpn 2019 at Unknown time Family Member Yes Yes   Sig: 15 Units by SubCUTAneous route Before breakfast, lunch, and dinner. levothyroxine (SYNTHROID) 100 mcg tablet 2019 at Unknown time Family Member Yes Yes   Sig: Take 100 mcg by mouth Daily (before breakfast). lisinopril (PRINIVIL, ZESTRIL) 40 mg tablet 2019 at Unknown time Family Member Yes Yes   Sig: Take 40 mg by mouth daily. omega-3 fatty acids-vitamin e (FISH OIL) 1,000 mg cap 2019 at Unknown time Family Member Yes Yes   Sig: Take 1 Cap by mouth three (3) times daily.       Facility-Administered Medications: None      Thank you,      Vijay Kim, PharmD, BCPS

## 2019-09-10 NOTE — CONSULTS
[facility]: [id number] [address] Neurology  [address]  [phone]      Name:   Bartolome Boyd   Medical record #: [id number]  Admission Date: 9/10/2019     Who Consulted: Dr. Feroz Wilson    Reason for Consult:  Rule out stroke    HISTORY OF PRESENT ILLNESS:     This is a [de-identified] y.o. male who is admitted for blurry vision. The Neurology Service is asked to evaluate for stroke. Mr. Megan Moss presented to the ED with  acute onset of bilateral vision loss similar to his previous stroke. In the ED he stated that his symptoms lasted for about \"a few minutes\" but gradually improved. The ED doctor found that he had left homonymous hemianopia to confrontation, normal vision in the right visual field bilaterally. His admission Bp was 161/71. 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 He was seen by our service in June of 2018 for right occipital stroke. He was discharged home on ASA, which was stopped at some point due to a gastric ulcer. Neuro-imaging:     CT Head: No acute process    CTA Head and Neck: No significant vascular abnormalities demonstrated.      EKG: normal sinus rhythm. Care Plan discussed with:  Patient x   Family    RN    Care Manager    Consultant/Specialist:         Thank you for allowing the Neurology Service the pleasure of participating in the care of your patient. This patient will be discussed with my collaborating care team physician,  Dr. Ronan Adams and he may have further recommendations regarding this patient's care      Antonia White, ACNP-BC  ====================      Attending Attestation:       NEUROLOGY NOTE ADDENDUM:    9/10/2019      I have reviewed the documentation provided by the nurse practitioner, discussed her findings, clinical impression, and the proposed management plans with regards to this patient's encounter. I have personally performed a face to face diagnostic evaluation on this patient.  My findings are as follows:      Mancil Schlatter is a [de-identified] y.o. male who  has a past medical history of BPH (benign prostatic hyperplasia), Diabetes ([address] HTN (hypertension), Hyperlipidemia, Thyroid disease, and Type II or unspecified type diabetes mellitus without mention of complication, uncontrolled. He also has no past medical history of Difficult intubation. who presents for evaluation of blurred vision. Patient came in with acute onset of complete bilateral vision loss lasting for 2-3 mins when he stood up. Patient has a baseline left homonymous hemianopia from previous R occipital stroke. His admission Bp was 161/71. 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 He was seen by our service in June of 2018 for right occipital stroke. He was discharged home on ASA, which was stopped at some point due to a gastric ulcer. Exam:  Visit Vitals  /82 (BP Patient Position: At rest;Sitting)   Pulse 80   Temp 97.7 °F (36.5 °C)   Resp 20   Ht 5' 11\" (1.803 m)   Wt 99.8 kg (220 lb)   SpO2 97%   BMI 30.68 kg/m²     Gen: Awake, alert, follows commands  Appropriate appearance, normal speech. Oriented to all spheres. (+) L visual field deficit  Full eyes movement, with no nystagmus, no diplopia, no ptosis. Normal gag and swallow. All remaining cranial nerves were normal  Motor function: 5/5 in all extremities  Sensory: intact to LT, PP and JPS  Good FTN and HTS   Gait: Deferred      Assessment  Transient visual disturbance; evaluate for new stroke  Baseline L homonymous hemianopsia due to previous R occipital stroke    Plan  1) Awaiting MRI brain  2) Will put on Plavix 75 every day instead of ASA due to history of gastric ulcer  3) LDL 61. 4    Thank you for the consultation. Ivone Venegas MD  Diplomate, American Board of Psychiatry and Neurology  Diplomate, Neuromuscular Medicine  Diplomate, American Board of Electrodiagnostic Medicine                       Assessment/Plan:     1.   Transient Ischemic Attack, r/o Stroke:    · Stop ASA and start Plavix  · Will need Plavix at discharge  · Neurochecks:  Every 4 hours  · Blood Sugar Goal:  140-180  · BP Goal: Less than 160  · Telemetry for at least 24 hours    Stroke work up  · A1C:  11.7  · LDL:  61.4  · TTE:  Low normal systolic dysfunction. Estimated left ventricular ejection fraction is 51 - 55%  · MRI:  · Carotid vascular imaging:  No significant stenosis    Risk factors for stroke include:  Obesity, DM, HTN, CAD, HLD, physical inactivity  · Discussed with patient    · Discussed signs/symptoms of stroke and when to call 911    3. Mobility:   · Has been/not been OOB. · PT/OT to eval for rehab    4. Diet:    · Does not need SLP, passed STAND    5. VTE Prophylaxes:   · Per Primary team           Review of Systems: 10 point ROS was performed. Pertinent positives listed in HPI. Negative ROS is as follows. Pt denies: angina, palpitations, paresthesias, weakness, vision loss, slurred speech, aphasia, confusion, fever, chills, falls, headache, diplopia, back pain, neck pain, prior episodes of vertigo, hallucinations, new medications or dosage changes. 6/21/2018 Exam by Dr. Emilia Robison:  GENERAL:  Attention normal.  Language, naming, repetition and fluency are normal.  MEMORY:  Intact to recent and remote memory. EYES:  Conjunctivae clear. Pupils equally round and reactive to light. Extraocular movements are intact with no nystagmus seen. Visual fields with a left homonymous hemianopsia. CRANIAL NERVES II-XII:  Visual field as above. Pupils equally round and reactive to light. Extraocular movements intact. Face is symmetric. Tongue and palate are midline. MOTOR:  Normal bulk and tone. No tremor. Strength appears to be 5/5 in all 4 extremities. SENSORY:  Intact to light touch and pinprick x4. COORDINATION:  Finger-to-nose with some dysmetria on the right, otherwise intact. GAIT:  Deferred. Reflexes 2+ throughout.     Physical Exam    General:   Alert, cooperative, no acute distress. Lungs:   Clear to auscultation bilaterally. No crackles/wheezes. Heart:  Abdominal:  Normal rhythm, no carotid bruits, no peripheral edema  Soft and nondistended   NEUROLOGICAL EXAM:     Mental Status: Oriented to time, place and person. Fully attentive. No aphasia. Full fund of knowledge. Normal recent and remote memory. Cranial Nerves:   Visual Fields:  normal in all quadrants in both eyes. EOM: no nystagmus. Facial movements:  symmetric, no ptosis Facial sensation:  intact to LT on both sides. Hearing:  normal.       Language:  no dysarthria, no aphasia, normal fluency, normal repetition. Tongue: midline. Soft palate: not examined  SCMs: normal, symmetric. Reflexes:   LUExt: 2+/ 4                 RUExt: 2+/ 4  LLExt: 2+/4                  RLExt: 2+/ 4          Sensory:   LT and Temp intact in all extremities            Cerebellar:  No resting, no postural tremors, normal finger nose finger. No pronator drift                            Motor:           LUExt: 5/ 5               RUExt: 5/ 5                                              LLExt: 5/ 5                RLExt: 5/ 5        Gait:   Not tested              Allergies:   No Known Allergies    Outpatient Meds  No current facility-administered medications on file prior to encounter. Current Outpatient Medications on File Prior to Encounter   Medication Sig Dispense Refill    glucose blood VI test strips (ACCU-CHEK GUIDE) strip Use to check blood glucose 3 x daily. Dx code E11.65 300 Strip 3    Blood-Glucose Meter (ACCU-CHEK GUIDE GLUCOSE METER) misc Use to check blood glucose 3 x daily. Dx code E11.65 1 Each 0    lancets (ACCU-CHEK FASTCLIX LANCET DRUM) misc Use to check blood glucose 3 x daily. Dx code E11.65 300 Each 3    insulin aspart U-100 (NOVOLOG FLEXPEN U-100 INSULIN) 100 unit/mL (3 mL) inpn 15-20 Units by SubCUTAneous route Before breakfast, lunch, and dinner.  30 mL 11  TRESIBA FLEXTOUCH U-100 100 unit/mL (3 mL) inpn 60 Units nightly.  escitalopram oxalate (LEXAPRO) 10 mg tablet Take 10 mg by mouth daily.  cyanocobalamin (VITAMIN B-12) 100 mcg tablet Take 100 mcg by mouth daily.  levothyroxine (SYNTHROID) 75 mcg tablet Take 75 mcg by mouth Daily (before breakfast).  fenofibrate micronized (LOFIBRA) 134 mg capsule Take 134 mg by mouth daily. 3    lisinopril (PRINIVIL, ZESTRIL) 40 mg tablet Take 40 mg by mouth daily.  omega-3 fatty acids-vitamin e (FISH OIL) 1,000 mg cap Take 3 Caps by mouth daily.  aspirin delayed-release 81 mg tablet Take 81 mg by mouth daily.          Inpatient Meds    Current Facility-Administered Medications   Medication Dose Route Frequency Provider Last Rate Last Dose    aspirin delayed-release tablet 81 mg  81 mg Oral DAILY Mynor Mcgowan MD   81 mg at 09/10/19 0833    escitalopram oxalate (LEXAPRO) tablet 10 mg  10 mg Oral DAILY Mynor Mcgowan MD   10 mg at 09/10/19 0717    fenofibrate nanocrystallized (TRICOR) tablet 96 mg  96 mg Oral DAILY Mynor Mcgowan MD   96 mg at 09/10/19 0833    levothyroxine (SYNTHROID) tablet 75 mcg  75 mcg Oral ACB Mynor Mcgowan MD   Stopped at 09/10/19 0630    lisinopril (PRINIVIL, ZESTRIL) tablet 40 mg  40 mg Oral DAILY Mynor Mcgowan MD   40 mg at 09/10/19 0833    insulin glargine (LANTUS) injection 40 Units  40 Units SubCUTAneous QHS Kt Mcgowan MD   40 Units at 09/10/19 0155    0.9% sodium chloride infusion  50 mL/hr IntraVENous CONTINUOUS Mynor Mcgowan MD 50 mL/hr at 09/10/19 0156 50 mL/hr at 09/10/19 0156    sodium chloride (NS) flush 5-40 mL  5-40 mL IntraVENous Q8H Kt Mcgowan MD   Stopped at 09/10/19 0600    sodium chloride (NS) flush 5-40 mL  5-40 mL IntraVENous PRN Mynor Mcgowan MD        acetaminophen (TYLENOL) tablet 650 mg  650 mg Oral Q4H PRN Mynor Mcgowan MD        HYDROcodone-acetaminophen (NORCO) 5-325 mg per tablet 1 Tab  1 Tab Oral Q4H PRN Kt Mcgowan MD        HYDROmorphone (DILAUDID) syringe 0.5 mg  0.5 mg IntraVENous Q4H PRN Mynor Mcgowan MD        naloxone (NARCAN) injection 0.4 mg  0.4 mg IntraVENous PRN Mynor Mcgowan MD        diphenhydrAMINE (BENADRYL) injection 12.5 mg  12.5 mg IntraVENous Q4H PRN Mynor Mcgowan MD        ondansetron (ZOFRAN) injection 4 mg  4 mg IntraVENous Q6H PRN Mynor Mcgowan MD        docusate sodium (COLACE) capsule 100 mg  100 mg Oral BID Mila Mcgowan MD   100 mg at 09/10/19 3900    sodium chloride (NS) flush 5-40 mL  5-40 mL IntraVENous Q8H Mila Mcgowan MD   Stopped at 09/10/19 0707    sodium chloride (NS) flush 5-40 mL  5-40 mL IntraVENous PRN Mynor Mcgowan MD        glucose chewable tablet 16 g  4 Tab Oral PRN Mnyor Mcgowan MD        glucagon (GLUCAGEN) injection 1 mg  1 mg IntraMUSCular PRN Mynor Mcgowan MD        dextrose 10% infusion 0-250 mL  0-250 mL IntraVENous PRN Mynor Mcgowan MD        insulin lispro (HUMALOG) injection   SubCUTAneous AC&HS Mila Mcgowan MD   2 Units at 09/10/19 0831           Past Medical History:   Diagnosis Date    BPH (benign prostatic hyperplasia)     Diabetes ([facility]     HTN (hypertension)     Hyperlipidemia     Thyroid disease     Hypo thyroid    Type II or unspecified type diabetes mellitus without mention of complication, uncontrolled        Past Surgical History:   Procedure Laterality Date    HX ENDOSCOPY      HX ORTHOPAEDIC      Left shoulder       family history includes Cancer in his mother; Diabetes in his father, mother, and sister. reports that he has quit smoking. He has never used smokeless tobacco. He reports that he does not drink alcohol or use drugs.            Lab Results (last 24 hrs)  Recent Results (from the past 24 hour(s))   GLUCOSE, POC    Collection Time: 09/10/19 12:38 AM   Result Value Ref Range    Glucose (POC) 418 (H) 65 - 100 mg/dL    Performed by Daniella Bundy    CBC WITH AUTOMATED DIFF    Collection Time: 09/10/19 12:40 AM   Result Value Ref Range    WBC 11.5 (H) 4.1 - 11.1 K/uL    RBC 4. 35 4.10 - 5.70 M/uL    HGB 12.0 (L) 12.1 - 17.0 g/dL    HCT 37.4 36.6 - 50.3 %    MCV 86.0 80.0 - 99.0 FL    MCH 27.6 26.0 - 34.0 PG    MCHC 32.1 30.0 - 36.5 g/dL    RDW 13.8 11.5 - 14.5 %    PLATELET 391 511 - 015 K/uL    MPV 11.7 8.9 - 12.9 FL    NRBC 0.0 0  WBC    ABSOLUTE NRBC 0.00 0.00 - 0.01 K/uL    NEUTROPHILS 64 32 - 75 %    LYMPHOCYTES 25 12 - 49 %    MONOCYTES 6 5 - 13 %    EOSINOPHILS 3 0 - 7 %    BASOPHILS 1 0 - 1 %    IMMATURE GRANULOCYTES 1 (H) 0.0 - 0.5 %    ABS. NEUTROPHILS 7.5 1.8 - 8.0 K/UL    ABS. LYMPHOCYTES 2.9 0.8 - 3.5 K/UL    ABS. MONOCYTES 0.7 0.0 - 1.0 K/UL    ABS. EOSINOPHILS 0.4 0.0 - 0.4 K/UL    ABS. BASOPHILS 0.1 0.0 - 0.1 K/UL    ABS. IMM. GRANS. 0.1 (H) 0.00 - 0.04 K/UL    DF AUTOMATED     METABOLIC PANEL, COMPREHENSIVE    Collection Time: 09/10/19 12:40 AM   Result Value Ref Range    Sodium 135 (L) 136 - 145 mmol/L    Potassium 4.2 3.5 - 5.1 mmol/L    Chloride 103 97 - 108 mmol/L    CO2 25 21 - 32 mmol/L    Anion gap 7 5 - 15 mmol/L    Glucose 426 (H) 65 - 100 mg/dL    BUN 31 (H) 6 - 20 MG/DL    Creatinine 2.06 (H) 0.70 - 1.30 MG/DL    BUN/Creatinine ratio 15 12 - 20      GFR est AA 38 (L) >60 ml/min/1.73m2    GFR est non-AA 31 (L) >60 ml/min/1.73m2    Calcium 8.5 8.5 - 10.1 MG/DL    Bilirubin, total 0.3 0.2 - 1.0 MG/DL    ALT (SGPT) 24 12 - 78 U/L    AST (SGOT) 16 15 - 37 U/L    Alk. phosphatase 65 45 - 117 U/L    Protein, total 6.9 6.4 - 8.2 g/dL    Albumin 3.6 3.5 - 5.0 g/dL    Globulin 3.3 2.0 - 4.0 g/dL    A-G Ratio 1.1 1.1 - 2.2     PROTHROMBIN TIME + INR    Collection Time: 09/10/19 12:40 AM   Result Value Ref Range    INR 1.1 0.9 - 1.1      Prothrombin time 10.9 9.0 - 11.1 sec   SAMPLES BEING HELD    Collection Time: 09/10/19 12:40 AM   Result Value Ref Range    SAMPLES BEING HELD SS,RD     COMMENT        Add-on orders for these samples will be processed based on acceptable specimen integrity and analyte stability, which may vary by analyte.    LIPID PANEL Collection Time: 09/10/19 12:40 AM   Result Value Ref Range    LIPID PROFILE          Cholesterol, total 139 <200 MG/DL    Triglyceride 193 (H) <150 MG/DL    HDL Cholesterol 39 MG/DL    LDL, calculated 61.4 0 - 100 MG/DL    VLDL, calculated 38.6 MG/DL    CHOL/HDL Ratio 3.6 0.0 - 5.0     HEMOGLOBIN A1C WITH EAG    Collection Time: 09/10/19 12:40 AM   Result Value Ref Range    Hemoglobin A1c 11.7 (H) 4.2 - 6.3 %    Est. average glucose 289 mg/dL   TSH 3RD GENERATION    Collection Time: 09/10/19 12:40 AM   Result Value Ref Range    TSH 2.57 0.36 - 3.74 uIU/mL   POC CHEM8    Collection Time: 09/10/19 12:42 AM   Result Value Ref Range    Calcium, ionized (POC) 1.15 1.12 - 1.32 mmol/L    Sodium (POC) 132 (L) 136 - 145 mmol/L    Potassium (POC) 4.4 3.5 - 5.1 mmol/L    Chloride (POC) 99 98 - 107 mmol/L    CO2 (POC) 24 21 - 32 mmol/L    Anion gap (POC) 15 10 - 20 mmol/L    Glucose (POC) 438 (H) 65 - 100 mg/dL    BUN (POC) 36 (H) 9 - 20 mg/dL    Creatinine (POC) 2.0 (H) 0.6 - 1.3 mg/dL    GFRAA, POC 39 (L) >60 ml/min/1.73m2    GFRNA, POC 32 (L) >60 ml/min/1.73m2    Hematocrit (POC) 37 36.6 - 50.3 %    Comment Comment Not Indicated.      EKG, 12 LEAD, INITIAL    Collection Time: 09/10/19  1:02 AM   Result Value Ref Range    Ventricular Rate 75 BPM    Atrial Rate 75 BPM    P-R Interval 172 ms    QRS Duration 90 ms    Q-T Interval 404 ms    QTC Calculation (Bezet) 451 ms    Calculated P Axis 60 degrees    Calculated R Axis 0 degrees    Calculated T Axis 38 degrees    Diagnosis       ** Poor data quality, interpretation may be adversely affected  Sinus rhythm with occasional premature ventricular complexes  Otherwise normal ECG  When compared with ECG of 21-JUN-2018 14:12,  premature ventricular complexes are now present     GLUCOSE, POC    Collection Time: 09/10/19  2:22 AM   Result Value Ref Range    Glucose (POC) 347 (H) 65 - 100 mg/dL    Performed by [facility] POC    Collection Time: 09/10/19  6:41 AM   Result Value Ref Range    Glucose (POC) 158 (H) 65 - 100 mg/dL    Performed by Simón Hernandez

## 2019-09-10 NOTE — ED NOTES
Pt Throughput: Charge Nurse on 5th floor  made aware of patient's bed assignment, room 533. Angel Carreon RN  Emergency Dept Charge RN.

## 2019-09-10 NOTE — ED TRIAGE NOTES
Pt ambulatory to triage, states \"I think I've had another mini stroke. \" Patient states that yesterday at 2130 he had difficulty seeing, states that vision has restored at 2330 yesterday. Also states \"shooting\" pain in head.   Previous stroke June 2018, hx vertigo, denies dizziness, VAN neg

## 2019-09-10 NOTE — H&P
Matthieu Bishop [address]  [address] [facility], [place], [address] [place] [address]  [phone]    Admission History and Physical      NAME:  Robson Pozo   :   1940   MRN:  [id number]     PCP:  Jasmina Savage MD     Date/Time:  9/10/2019         Subjective:     CHIEF COMPLAINT: blurry vision     HISTORY OF PRESENT ILLNESS:     The patient is a [de-identified] yo hx of HTN, DM, occipital CVA w/ L hemianopsia, CKD 3, presented w/ bilateral blurry vision, CVA. The patient was sitting at home when he developed severe, bilateral blurry vision which lasted for 1 hour. He denied slurred speech, weakness, numbness, chest pain, SOB, fevers, chills, nausea, vomiting. His symptoms were similar to his occipital CVA in 2018. No Known Allergies    Prior to Admission medications    Medication Sig Start Date End Date Taking? Authorizing Provider   glucose blood VI test strips (ACCU-CHEK GUIDE) strip Use to check blood glucose 3 x daily. Dx code E11.65 19   Warren Springer MD   Blood-Glucose Meter (ACCU-CHEK GUIDE GLUCOSE METER) misc Use to check blood glucose 3 x daily. Dx code E11.65 19   Warren Springer MD   lancets (ACCU-CHEK FASTCLIX LANCET DRUM) misc Use to check blood glucose 3 x daily. Dx code E11.65 19   Warren Springer MD   insulin aspart U-100 (NOVOLOG FLEXPEN U-100 INSULIN) 100 unit/mL (3 mL) inpn 15-20 Units by SubCUTAneous route Before breakfast, lunch, and dinner. 19   MutЕлена pedro MD   TRESIBA FLEXTOUCH U-100 100 unit/mL (3 mL) inpn 60 Units nightly. 19   Provider, Historical   escitalopram oxalate (LEXAPRO) 10 mg tablet Take 10 mg by mouth daily. Provider, Historical   cyanocobalamin (VITAMIN B-12) 100 mcg tablet Take 100 mcg by mouth daily. Provider, Historical   levothyroxine (SYNTHROID) 75 mcg tablet Take 75 mcg by mouth Daily (before breakfast). Provider, Historical   fenofibrate micronized (LOFIBRA) 134 mg capsule Take 134 mg by mouth daily. 2/3/16   Provider, Historical   lisinopril (PRINIVIL, ZESTRIL) 40 mg tablet Take 40 mg by mouth daily. Provider, Historical   omega-3 fatty acids-vitamin e (FISH OIL) 1,000 mg cap Take 3 Caps by mouth daily. Provider, Historical   aspirin delayed-release 81 mg tablet Take 81 mg by mouth daily.     Provider, Historical       Past Medical History:   Diagnosis Date    BPH (benign prostatic hyperplasia)     Diabetes ([address]     HTN (hypertension)     Hyperlipidemia     Thyroid disease     Hypo thyroid    Type II or unspecified type diabetes mellitus without mention of complication, uncontrolled         Past Surgical History:   Procedure Laterality Date    HX ENDOSCOPY      HX ORTHOPAEDIC      Left shoulder       Social History     Tobacco Use    Smoking status: Former Smoker    Smokeless tobacco: Never Used    Tobacco comment: Smoked 50 years ago   Substance Use Topics    Alcohol use: No        Family History   Problem Relation Age of Onset    Diabetes Mother     Cancer Mother     Diabetes Father     Diabetes Sister         Review of Systems:  (bold if positive, if negative)    Gen:  Eyes:  Visual changes,ENT:  CVS:  Pulm:  GI:    :    MS:  Skin:  Psych:  Endo:    Hem:  Renal:    Neuro:          Objective:      VITALS:    Vital signs reviewed; most recent are:    Visit Vitals  /71 (BP 1 Location: Right arm, BP Patient Position: At rest)   Pulse 72   Temp 98.9 °F (37.2 °C)   Resp 20   Ht 5' 11\" (1.803 m)   Wt 99.8 kg (220 lb)   SpO2 95%   BMI 30.68 kg/m²     SpO2 Readings from Last 6 Encounters:   09/10/19 95%   07/25/19 94%   08/22/18 98%   06/26/18 94%   01/19/18 100%   01/17/18 97%        No intake or output data in the 24 hours ending 09/10/19 0108     Exam:     Physical Exam:    Gen:  Well-developed, well-nourished, obese, in no acute distress  HEENT:  Pink conjunctivae, PERRL, hearing intact to voice, moist mucous membranes  Neck:  Supple, without masses, thyroid non-tender  Resp:  No accessory muscle use, clear breath sounds without wheezes rales or rhonchi  Card:  No murmurs, normal S1, S2 without thrills, bruits or peripheral edema  Abd:  Soft, non-tender, non-distended, normoactive bowel sounds are present  Lymph:  No cervical adenopathy  Musc:  No cyanosis or clubbing  Skin:  No rashes   Neuro:  Cranial nerves 3-12 are grossly intact, chronic L hemianopsia,  strength is 5/5 bilaterally, dorsi / plantarflexion strength is 5/5 bilaterally, follows commands appropriately  Psych:  Alert with fair insight. Oriented to person, place, and time    Labs:    No results for input(s): WBC, HGB, HCT, PLT, HGBEXT, HCTEXT, PLTEXT in the last 72 hours. No results for input(s): NA, K, CL, CO2, GLU, BUN, CREA, CA, MG, PHOS, ALB, TBIL, TBILI, SGOT, ALT in the last 72 hours. Lab Results   Component Value Date/Time    Glucose (POC) 418 (H) 09/10/2019 12:38 AM    Glucose (POC) 246 (H) 08/22/2018 01:14 PM     No results for input(s): PH, PCO2, PO2, HCO3, FIO2 in the last 72 hours. No results for input(s): INR in the last 72 hours. No lab exists for component: INREXT    Radiology and EKG reviewed:   Head CT pending    **Old Records reviewed in [facility]**       Assessment/Plan:       Principal Problem:    [de-identified] yo hx of HTN, DM, occipital CVA w/ L hemianopsia, CKD 3, presented w/ bilateral blurry vision, CVA    1) Acute blurry vision/CVA (cerebral vascular accident): symptoms improved in ED. Hx of occipital CVA. Not candidate for TPA. ED has consulted tele neuro, obtained head CT/CTA. Will monitor on Tele. Check MRI, echo, carotid dopplers, TSH, lipids, A1C. Cont ASA, statin, fibrates. Consult Neuro    2) HTN: allow permissive HTN. Cont lisinopril    3) DM type 2: check A1C. Substitute Lantus for [place]. Start SSI    4) CKD 3: will recheck BMP.   Monitor     5) Hypothyroid: check TSH, cont synthroid    Risk of deterioration: high      Total time spent with patient: 70 Minutes Care Plan discussed with: Patient, family, ED, nursing    Discussed:  Care Plan    Prophylaxis:  SCD's    Probable Disposition:  Home w/Family           ___________________________________________________    Attending Physician: Preston Velazquez MD

## 2019-09-10 NOTE — DIABETES MGMT
[facility]    Elevated A1C Visit Note    Recommendations/ Comments: Spoke with patient due to elevated A1c. Pt reports he has had diabetes for many years. He states about 3 months ago his A1c was 13.3% and he was referred to an endocrinologist to manage his diabetes. He states he saw her in July and she adjusted his insulin. He states his blood sugars did get a little better but that they usually run around 250 mg/dl. He states he has a follow up appointment with his endo, he thinks in October. He is eating 3 meals per day and is drinking only sugar free beverages. He states he does have a sweet tooth and knows he needs to to a better job of controlling it. I reviewed the plate method with him for balance and portion control. He will follow up with his endo and expresses not further education needs at this time. Our out patient phone number was provided for questions. Blood sugars variable. Noted Lantus 40 units ordered today. DTC to continue to follow. Current hospital diabetes medications: Lantus 40 units and correction scale Humalog with normal sensitivity     Patient is a [de-identified] y.o. male with known diabetes on Tresiba 60 units hs and Novolog 15 units ac tid at home.      A1c:   Lab Results   Component Value Date/Time    Hemoglobin A1c 11.7 (H) 09/10/2019 12:40 AM    Hemoglobin A1c 9.6 (H) 06/22/2018 01:33 AM    Hemoglobin A1c, External 10.7 05/30/2018    Hemoglobin A1c, External 8.6 02/26/2016       Recent Glucose Results:   Lab Results   Component Value Date/Time     (H) 09/10/2019 12:40 AM    GLUCPOC 92 09/10/2019 11:41 AM    GLUCPOC 158 (H) 09/10/2019 06:41 AM    GLUCPOC 347 (H) 09/10/2019 02:22 AM        Lab Results   Component Value Date/Time    Creatinine 2.06 (H) 09/10/2019 12:40 AM       Active Orders   Diet    DIET NPO          Assessed and instructed patient on the following:   ·  interpretation of lab results, blood sugar goals, complications of diabetes mellitus, hypoglycemia prevention and treatment, SMBG skills, nutrition, site rotation and self-injection of insulin    Provided patient with the following: [x]          Diabetes Self-Care Guide              [x]          Outpatient DTC contact number          Patient to follow up with his endo after discharge. Will continue to follow as needed. Thank you.      Cb Dominguez RD, CDE    Time spent: 15 minutes

## 2019-09-10 NOTE — PROGRESS NOTES
Speech Pathology bedside swallow evaluation/discharge  Patient: Pilar Land (79 y.o. male)  Date: 9/10/2019  Primary Diagnosis: CVA (cerebral vascular accident) [facility]) [I63.9]       Precautions:        ASSESSMENT :  Based on the objective data described below, the patient presents with [facility] oropharyngeal swallow. Patient with prolonged mastication that is [facility] for edentulous status. Pharyngeal swallow intact and no overt s/s aspiration observed during PO intake. Patient did have delayed cough x1 that appeared related to phonation rather than swallowing. Patent denied decline in motor speech, language, or cognitive function. Note patient admitted with vision deficits that resolved. Skilled acute therapy provided by a speech-language pathologist is not indicated at this time. PLAN :  Recommendations:  --Mechanical soft/thin liquid diet given edentulous status  --Straws ok  --Meds whole  Discharge Recommendations: None     SUBJECTIVE:   Patient stated that he is hungry. Passed the STAND.     OBJECTIVE:     Past Medical History:   Diagnosis Date    BPH (benign prostatic hyperplasia)     Diabetes ([address]     HTN (hypertension)     Hyperlipidemia     Thyroid disease     Hypo thyroid    Type II or unspecified type diabetes mellitus without mention of complication, uncontrolled      Past Surgical History:   Procedure Laterality Date    HX ENDOSCOPY      HX ORTHOPAEDIC      Left shoulder     Prior Level of Function/Home Situation:   Home Situation  Home Environment: Private residence  # Steps to Enter: 3  One/Two Story Residence: One story  Living Alone: No  Support Systems: Family member(s)  Patient Expects to be Discharged to[de-identified] Private residence  Current DME Used/Available at Home: None  Diet prior to admission: soft solids/thin liquids  Current Diet:  NPO   Cognitive and Communication Status:  Neurologic State: Alert, Appropriate for age  Orientation Level: Oriented to person, Oriented to place, Oriented to situation, Disoriented to time, Other (Comment)(oriented to year, not month or date)  Cognition: Follows commands, Appropriate for age attention/concentration  Perception: Appears intact  Perseveration: No perseveration noted  Safety/Judgement: Awareness of environment  Oral Assessment:  Oral Assessment  Labial: No impairment  Dentition: Edentulous  Oral Hygiene: moist oral mucosa  Lingual: No impairment  Velum: No impairment  Mandible: No impairment  P.O. Trials:  Patient Position: upright in bed  Vocal quality prior to P.O.: No impairment  Consistency Presented: Ice chips; Thin liquid;Puree; Solid  How Presented: Self-fed/presented;Cup/sip;Cup/gulp; Spoon;Straw;Successive swallows     Bolus Acceptance: No impairment  Bolus Formation/Control: No impairment(WFL for dentition)     Propulsion: No impairment  Oral Residue: None  Initiation of Swallow: No impairment  Laryngeal Elevation: Functional  Aspiration Signs/Symptoms: None  Pharyngeal Phase Characteristics: No impairment, issues, or problems   Effective Modifications: Alternate liquids/solids  Cues for Modifications: None       Oral Phase Severity: Other (comment)(WFL for dentition)  Pharyngeal Phase Severity : No impairment  NOMS:   The NOMS functional outcome measure was used to quantify this patient's level of swallowing impairment. Based on the NOMS, the patient was determined to be at level 5 for swallow function     NOMS Swallowing Levels:  Level 1 (CN): NPO  Level 2 (CM): NPO but takes consistency in therapy  Level 3 (CL): Takes less than 50% of nutrition p.o. and continues with nonoral feedings; and/or safe with mod cues; and/or max diet restriction  Level 4 (CK):  Safe swallow but needs mod cues; and/or mod diet restriction; and/or still requires some nonoral feeding/supplements  Level 5 (CJ): Safe swallow with min diet restriction; and/or needs min cues  Level 6 (CI): Independent with p.o.; rare cues; usually self cues; may need to avoid some foods or needs extra time  Level 7 [facility]): Independent for all p.o.  JOAN. (2003). National Outcomes Measurement System (NOMS): Adult Speech-Language Pathology User's Guide. Pain:  Pain Scale 1: Numeric (0 - 10)  Pain Intensity 1: 0     After treatment:   [] Patient left in no apparent distress sitting up in chair  [x] Patient left in no apparent distress in bed  [x] Call bell left within reach  [x] Nursing notified  [] Caregiver present  [] Bed alarm activated    COMMUNICATION/EDUCATION:   The patients plan of care including findings, recommendations, and recommended diet changes were discussed with: Occupational Therapist and Registered Nurse. [x] Patient/family have participated as able and agree with findings and recommendations. [] Patient is unable to participate in plan of care at this time.     Thank you for this referral.  Rosas Singh SLP  Time Calculation: 10 mins

## 2019-09-10 NOTE — PROGRESS NOTES
Reviewed admission note and plans. Awaiting Neurology, MRI, PT/OT/speech. Stable. Continue current plan. Non billing encounter.

## 2019-09-10 NOTE — PROGRESS NOTES
Reason for Admission:   CVA               RRAT Score:     33             Resources/supports as identified by patient/family:   Good support from daughter and grandson                Top Challenges facing patient (as identified by patient/family and CM): Finances/Medication cost?   No issues. Patient has prescription coverage using [facility] on [address]? Daughter transports patient and wife as they do not drive              Support system or lack thereof? Daughter attentive and supportive. She goes to patient's home daily for 4/5 hours to assist with patient's wife who has Alzheimers                   Living arrangements? Patient lives with wife in a one story home with 4 steps to enter. Self-care/ADLs/Cognition? Patient has been independent with all his ADLS's           Current Advanced Directive/Advance Care Plan:  No                          Plan for utilizing home health:    PT has cleared the patient with no home PT needed                 Transition of Care Plan:                  Met with patient and his daughter Avelina Ortiz, patient's daughter, [phone]. Address confirmed. Daughter's 24 yr old son is caring for patient's wife. Daughter goes over there daily 4/5 hours a day and helps with patient's wife. Patient is independent using no medical equipment. No discharge needs expected at this time. Plan: discharge home with help from daughter. Care Management Interventions  PCP Verified by CM: Yes(Dr. Flaco Tracy)  Mode of Transport at Discharge:  Other (see comment)(daughter)  Transition of Care Consult (CM Consult): Discharge Planning  Discharge Durable Medical Equipment: No  Physical Therapy Consult: Yes  Occupational Therapy Consult: Yes  Speech Therapy Consult: Yes  Current Support Network: Lives with Spouse  Confirm Follow Up Transport: Family  Plan discussed with Pt/Family/Caregiver: Yes  Discharge Location  Discharge Placement: Home [facility]

## 2019-09-10 NOTE — PROGRESS NOTES
OCCUPATIONAL THERAPY EVALUATION/DISCHARGE  Patient: Peggye Duverney [de-identified][de-identified] y.o. male)  Date: 9/10/2019  Primary Diagnosis: CVA (cerebral vascular accident) [facility]) [I63.9]       Precautions:       ASSESSMENT  Based on the objective data described below, the patient presents near functional baseline following admission for CVA. At baseline pt lives with his wife, and is I with ADLs, IADLs and functional mobility. Has history of CVA in June with residual L homonymous hemianopsia. Received seated EOB finishing working with speech therapy, self-feeding independently. Fugl Nagy UE assessment performed and pt scored 61/66 due to baseline L shoulder issues from previous fracture, reports no changes in sensation or UE strength. Pt denied blurriness and double vision, acuity in tact, denies visual deficits apart from baseline L homonymous hemianopsia. Performed functional mobility and UB and LB ADLs with mod I during session. He had no further questions or concerns for acute OT, is near functional baseline for ADLs and mobility. Current Level of Function (ADLs/self-care): Mod I for ADLs and mobility without AD. Functional Outcome Measure: The patient scored 90/100 on the Barthel Index outcome measure which is indicative of very minimal impairment in ADL performance. Other factors to consider for discharge: Pt lives with wife who has dementia, daughter comes over several times a day to assist with caring for wife. PLAN :  Recommendation for discharge: (in order for the patient to meet his/her long term goals)  No skilled occupational therapy/ follow up rehabilitation needs identified at this time. This discharge recommendation:  Has been made in collaboration with the attending provider and/or case management    Equipment recommendations for successful discharge: none       SUBJECTIVE:   Patient stated I have no pain at all.     OBJECTIVE DATA SUMMARY:   HISTORY:   Past Medical History:   Diagnosis Date    BPH (benign prostatic hyperplasia)     [facility] [address]     HTN (hypertension)     Hyperlipidemia     Thyroid disease     Hypo thyroid    Type II or unspecified type diabetes mellitus without mention of complication, uncontrolled      Past Surgical History:   Procedure Laterality Date    HX ENDOSCOPY      HX ORTHOPAEDIC      Left shoulder       Prior Level of Function/Environment/Context:  At baseline pt lives with his wife, and is I with ADLs, IADLs and functional mobility. Has history of CVA in June with residual L homonymous hemianopsia. Pt's wife has dementia and he helps care for her, daughter comes over several times a day to assist as well. Expanded or extensive additional review of patient history:     Home Situation  Home Environment: Private residence  # Steps to Enter: 3  One/Two Story Residence: One story  Living Alone: No  Support Systems: Family member(s)  Patient Expects to be Discharged to[de-identified] Private residence  Current DME Used/Available at Home: None    Hand dominance: Right    EXAMINATION OF PERFORMANCE DEFICITS:  Cognitive/Behavioral Status:  Neurologic State: Alert; Appropriate for age  Orientation Level: Oriented to person;Oriented to place;Oriented to situation;Disoriented to time; Other (Comment)(oriented to year, not month or date)  Cognition: Follows commands; Appropriate for age attention/concentration  Perception: Appears intact  Perseveration: No perseveration noted  Safety/Judgement: Awareness of environment    Hearing: Auditory  Auditory Impairment: None    Vision/Perceptual:    Tracking: Requires cues, head turns, or add eye shifts to track(baseline L homonymous hemionopsia)              Visual Fields: Difficulty detecting stimulus  in left lateral quadrant(baseline L homonyomous hemionopsia)  Diplopia: No    Acuity: Able to read clock/calendar on wall without difficulty; Able to read employee name badge without difficulty    Corrective Lenses: Glasses    Range of Motion:  AROM: Within functional limits(apart from L shoulder, hx of fracture)  PROM: Within functional limits     Strength:  Strength: Within functional limits        Coordination:  Coordination: Within functional limits  Fine Motor Skills-Upper: Left Intact; Right Intact    Gross Motor Skills-Upper: Right Intact; Left Intact    Tone & Sensation:  Tone: Normal  Sensation: Intact        Balance:  Sitting: Intact  Standing: Intact; Without support    Functional Mobility and Transfers for ADLs:  Bed Mobility:  Rolling: Independent  Supine to Sit: Independent  Sit to Supine: Independent  Scooting: Independent    Transfers:  Sit to Stand: Modified independent  Stand to Sit: Modified independent  Bed to Chair: Modified independent  Toilet Transfer : Modified independent    ADL Assessment:  Feeding: Independent    Oral Facial Hygiene/Grooming: Modified Independent(standing at sink)    Bathing: Modified independent    Upper Body Dressing: Independent    Lower Body Dressing: Independent    Toileting: Independent       ADL Intervention and task modifications:  Feeding  Feeding Assistance: Independent    Grooming  Washing Hands: Independent    Lower Body Dressing Assistance  Shoes with Cloth Laces: Modified independent         Cognitive Retraining  Safety/Judgement: Awareness of environment    Functional Measure:  Fugl-Nagy Assessment of Motor Recovery after Stroke:   Reflex Activity  Flexors/Biceps/Fingers: Can be elicited  Extensors/Triceps: Can be elicited  Reflex Subtotal: 4    Volitional Movement Within Synergies  Shoulder Retraction: Full  Shoulder Elevation: Full  Shoulder Abduction (90 degrees): Partial  Shoulder External Rotation: Full  Elbow Flexion: Full  Forearm Supination: Full  Shoulder Adduction/Internal Rotation: Partial  Elbow Extension: Full  Forearm Pronation: Full  Subtotal: 16    Volitional Movement Mixing Synergies  Hand to Lumbar Spine: Partial  Shoulder Flexion (0-90 degrees): Full  Pronation-Supination: Full  Subtotal: 5    Volitional Movement With Little or No Synergy  Shoulder Abduction (0-90 degrees): Partial  Shoulder Flexion ( degrees): Partial  Pronation/Supination: Full  Subtotal : 4    Normal Reflex Activity  Biceps, Triceps, Finger Flexors: Full  Subtotal : 2    Upper Extremity Total   Upper Extremity Total: 31    Wrist  Stability at 15 Degree Dorsiflexion: Full  Repeated Dorsiflexion/ Volar Flexion: Full  Stability at 15 Degree Dorsiflexion: Full  Repeated Dorsiflexion/ Volar Flexion: Full  Circumduction: Full  Wrist Total: 10    Hand  Mass Flexion: Full  Mass Extension: Full  Grasp A: Full  Grasp B: Full  Grasp C: Full  Grasp D: Full  Grasp E: Full  Hand Total: 14    Coordination/Speed  Tremor: None  Dysmetria: None  Time: <1s  Coordination/Speed Total : 6    Total A-D  Total A-D (Motor Function): 61/66     This is a reliable/valid measure of arm function after a neurological event. It has established value to characterize functional status and for measuring spontaneous and therapy-induced recovery; tests proximal and distal motor functions. Fugl-Nagy Assessment  UE scores recorded between five and 30 days post neurologic event can be used to predict UE recovery at six months post neurologic event. Severe = 0-21 points   Moderately Severe = 22-33 points   Moderate = 34-47 points   Mild = 48-66 points  YULISA Muniz, JAMEE Benjamin, & NAIMA Robbins (1992). Measurement of motor recovery after stroke: Outcome assessment and sample size requirements.  Stroke, 23, pp. 8617-8676.   ------------------------------------------------------------------------------------------------------------------------------------------------------------------  MCID:  Stroke:   Kalyani Bough et al, 2001; n = 171; mean age 79 (6) years; assessed within 16 (12) days of stroke, Acute Stroke)  FMA Motor Scores from Admission to Discharge   10 point increase in FMA Upper Extremity = 1.5 change in discharge FIM   10 point increase in FMA Lower Extremity = 1.9 change in discharge FIM  MDC:   Stroke:   Oz Harley et al, 2008, n = 14, mean age = 59.9 (14.6) years, assessed on average 14 (6.5) months post stroke, Chronic Stroke)   FMA = 5.2 points for the Upper Extremity portion of the assessment     Occupational Therapy Evaluation Charge Determination   History Examination Decision-Making   LOW Complexity : Brief history review  LOW Complexity : 1-3 performance deficits relating to physical, cognitive , or psychosocial skils that result in activity limitations and / or participation restrictions  LOW Complexity : No comorbidities that affect functional and no verbal or physical assistance needed to complete eval tasks       Based on the above components, the patient evaluation is determined to be of the following complexity level: LOW   Pain Ratin/10    Activity Tolerance:   Good  Please refer to the flowsheet for vital signs taken during this treatment. After treatment patient left in no apparent distress:    Sitting EOB, Bed / chair alarm activated and Side rails x 3    COMMUNICATION/EDUCATION:   The patients plan of care was discussed with: Physical Therapist and Registered Nurse. Patient was educated regarding his deficit(s) of impaired vision as this relates to his diagnosis of CVA. He demonstrated Good understanding as evidenced by teachback. Patient and/or family was verbally educated on the BE FAST acronym for signs/symptoms of CVA and TIA. Informed patient to refer to the Stroke Binder for further BE FAST information. All questions answered with patient indicating good understanding.      Thank you for this referral.  Bismark Dumont OT  Time Calculation: 20 mins

## 2019-09-10 NOTE — PROGRESS NOTES
Stroke treatment brochure was provided to: patient and spouse/SO. Rationale for acute work-up of symptoms explained. Possible treatments, such as tPA or intervention for ischemic strokes and the need for a quick work-up, have been reviewed.

## 2019-09-10 NOTE — PROGRESS NOTES
Interdisciplinary team rounds were held 9/10/2019 with the following team members:Care Management, Nursing, Nutrition, Pharmacy and Physical Therapy .     Plan: Neuro to see   possibly discharged 9/11

## 2019-09-10 NOTE — ED PROVIDER NOTES
75-year-old presenting with acute loss of vision, binocular. 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 Today at 9:30 PM he had acute onset of bilateral vision loss. Denies any recent aphasia, focal motor weakness, ataxia. States that his symptoms lasted for about \"a few minutes\" but gradually improved. He states that he is now at his baseline. He had a few sharp pains in his head around that time but he has no current headache. Denies nausea or vomiting.            Past Medical History:   Diagnosis Date    BPH (benign prostatic hyperplasia)     Diabetes ([facility]     HTN (hypertension)     Hyperlipidemia     Thyroid disease     Hypo thyroid    Type II or unspecified type diabetes mellitus without mention of complication, uncontrolled        Past Surgical History:   Procedure Laterality Date    HX ENDOSCOPY      HX ORTHOPAEDIC      Left shoulder         Family History:   Problem Relation Age of Onset    Diabetes Mother     Cancer Mother     Diabetes Father     Diabetes Sister        Social History     Socioeconomic History    Marital status:      Spouse name: Not on file    Number of children: Not on file    Years of education: Not on file    Highest education level: Not on file   Occupational History    Not on file   Social Needs    Financial resource strain: Not on file    Food insecurity:     Worry: Not on file     Inability: Not on file    Transportation needs:     Medical: Not on file     Non-medical: Not on file   Tobacco Use    Smoking status: Former Smoker    Smokeless tobacco: Never Used    Tobacco comment: Smoked 50 years ago   Substance and Sexual Activity    Alcohol use: No    Drug use: No    Sexual activity: Not on file   Lifestyle    Physical activity:     Days per week: Not on file     Minutes per session: Not on file    Stress: Not on file   Relationships    Social connections:     Talks on phone: Not on file     Gets together: Not on file     Attends Scientologist service: Not on file     Active member of club or organization: Not on file     Attends meetings of clubs or organizations: Not on file     Relationship status: Not on file    Intimate partner violence:     Fear of current or ex partner: Not on file     Emotionally abused: Not on file     Physically abused: Not on file     Forced sexual activity: Not on file   Other Topics Concern    Not on file   Social History Narrative    ** Merged History Encounter **              ALLERGIES: Patient has no known allergies. Review of Systems   Constitutional: Negative for chills, fatigue and fever. HENT: Negative for ear pain, sore throat and trouble swallowing. Eyes: Positive for visual disturbance. Respiratory: Negative for cough and shortness of breath. Cardiovascular: Negative for chest pain and leg swelling. Gastrointestinal: Negative for abdominal pain. Genitourinary: Negative for dysuria. Musculoskeletal: Negative for back pain. Skin: Negative for rash. Neurological: Positive for headaches. Negative for seizures, speech difficulty and light-headedness. Psychiatric/Behavioral: Negative for confusion. All other systems reviewed and are negative. Vitals:    09/10/19 0028   BP: 161/71   Pulse: 72   Resp: 20   Temp: 98.9 °F (37.2 °C)   SpO2: 95%   Weight: 99.8 kg (220 lb)   Height: 5' 11\" (1.803 m)            Physical Exam   Constitutional: He appears well-developed. HENT:   Head: Normocephalic and atraumatic. Eyes: EOM are normal.   Neck: No neck rigidity. Cardiovascular: Normal rate and intact distal pulses. Pulmonary/Chest: Effort normal.   Abdominal: He exhibits no distension. There is no tenderness. Musculoskeletal: He exhibits no edema. Neurological: He is alert. No cranial nerve deficit.    He has left homonymous hemianopia to confrontation, normal vision in the right visual field bilaterally, no diplopia, normal cranial nerve exam, full strength in his upper and lower extremities, symmetric. Normal sensation to light touch. No dysarthria. Alert and oriented. Skin: Skin is warm. He is not diaphoretic. Psychiatric: He has a normal mood and affect. Nursing note and vitals reviewed.        MDM  Number of Diagnoses or Management Options     Amount and/or Complexity of Data Reviewed  Clinical lab tests: reviewed and ordered  Tests in the radiology section of CPT®: ordered and reviewed  Tests in the medicine section of CPT®: ordered and reviewed  Discuss the patient with other providers: yes (Hospitalist, neurology)  Independent visualization of images, tracings, or specimens: yes    Risk of Complications, Morbidity, and/or Mortality  Presenting problems: high  Diagnostic procedures: high  Management options: high    Critical Care  Total time providing critical care: (32)        Stroke alert activated on arrival  To ct 1240  fs 400s    Procedures

## 2019-09-11 VITALS
TEMPERATURE: 97.6 F | RESPIRATION RATE: 18 BRPM | SYSTOLIC BLOOD PRESSURE: 147 MMHG | DIASTOLIC BLOOD PRESSURE: 79 MMHG | OXYGEN SATURATION: 96 % | HEIGHT: 71 IN | BODY MASS INDEX: 30.8 KG/M2 | HEART RATE: 58 BPM | WEIGHT: 220 LBS

## 2019-09-11 LAB
ALBUMIN SERPL-MCNC: 3.3 G/DL (ref 3.5–5)
ALBUMIN/GLOB SERPL: 1 {RATIO} (ref 1.1–2.2)
ALP SERPL-CCNC: 53 U/L (ref 45–117)
ALT SERPL-CCNC: 21 U/L (ref 12–78)
ANION GAP SERPL CALC-SCNC: 0 MMOL/L (ref 5–15)
AST SERPL-CCNC: 16 U/L (ref 15–37)
BILIRUB DIRECT SERPL-MCNC: 0.1 MG/DL (ref 0–0.2)
BILIRUB SERPL-MCNC: 0.4 MG/DL (ref 0.2–1)
BUN SERPL-MCNC: 25 MG/DL (ref 6–20)
BUN/CREAT SERPL: 16 (ref 12–20)
CALCIUM SERPL-MCNC: 8.5 MG/DL (ref 8.5–10.1)
CHLORIDE SERPL-SCNC: 108 MMOL/L (ref 97–108)
CO2 SERPL-SCNC: 31 MMOL/L (ref 21–32)
CREAT SERPL-MCNC: 1.53 MG/DL (ref 0.7–1.3)
ERYTHROCYTE [DISTWIDTH] IN BLOOD BY AUTOMATED COUNT: 13.8 % (ref 11.5–14.5)
GLOBULIN SER CALC-MCNC: 3.4 G/DL (ref 2–4)
GLUCOSE BLD STRIP.AUTO-MCNC: 90 MG/DL (ref 65–100)
GLUCOSE SERPL-MCNC: 75 MG/DL (ref 65–100)
HCT VFR BLD AUTO: 38.4 % (ref 36.6–50.3)
HGB BLD-MCNC: 12.3 G/DL (ref 12.1–17)
MAGNESIUM SERPL-MCNC: 2.1 MG/DL (ref 1.6–2.4)
MCH RBC QN AUTO: 27.7 PG (ref 26–34)
MCHC RBC AUTO-ENTMCNC: 32 G/DL (ref 30–36.5)
MCV RBC AUTO: 86.5 FL (ref 80–99)
NRBC # BLD: 0 K/UL (ref 0–0.01)
NRBC BLD-RTO: 0 PER 100 WBC
PHOSPHATE SERPL-MCNC: 3 MG/DL (ref 2.6–4.7)
PLATELET # BLD AUTO: 290 K/UL (ref 150–400)
PMV BLD AUTO: 11.2 FL (ref 8.9–12.9)
POTASSIUM SERPL-SCNC: 3.8 MMOL/L (ref 3.5–5.1)
PROT SERPL-MCNC: 6.7 G/DL (ref 6.4–8.2)
RBC # BLD AUTO: 4.44 M/UL (ref 4.1–5.7)
SERVICE CMNT-IMP: NORMAL
SODIUM SERPL-SCNC: 139 MMOL/L (ref 136–145)
WBC # BLD AUTO: 8.8 K/UL (ref 4.1–11.1)

## 2019-09-11 PROCEDURE — 80048 BASIC METABOLIC PNL TOTAL CA: CPT

## 2019-09-11 PROCEDURE — 74011250637 HC RX REV CODE- 250/637: Performed by: INTERNAL MEDICINE

## 2019-09-11 PROCEDURE — 82962 GLUCOSE BLOOD TEST: CPT

## 2019-09-11 PROCEDURE — 84100 ASSAY OF PHOSPHORUS: CPT

## 2019-09-11 PROCEDURE — 74011250637 HC RX REV CODE- 250/637: Performed by: NURSE PRACTITIONER

## 2019-09-11 PROCEDURE — 36415 COLL VENOUS BLD VENIPUNCTURE: CPT

## 2019-09-11 PROCEDURE — 85027 COMPLETE CBC AUTOMATED: CPT

## 2019-09-11 PROCEDURE — 80076 HEPATIC FUNCTION PANEL: CPT

## 2019-09-11 PROCEDURE — 83735 ASSAY OF MAGNESIUM: CPT

## 2019-09-11 RX ORDER — CLOPIDOGREL BISULFATE 75 MG/1
75 TABLET ORAL DAILY
Qty: 30 TAB | Refills: 0 | Status: SHIPPED | OUTPATIENT
Start: 2019-09-11 | End: 2019-10-17 | Stop reason: SDUPTHER

## 2019-09-11 RX ADMIN — CLOPIDOGREL BISULFATE 75 MG: 75 TABLET ORAL at 08:28

## 2019-09-11 RX ADMIN — DOCUSATE SODIUM 100 MG: 100 CAPSULE, LIQUID FILLED ORAL at 08:28

## 2019-09-11 RX ADMIN — ESCITALOPRAM OXALATE 10 MG: 10 TABLET ORAL at 08:28

## 2019-09-11 RX ADMIN — Medication 10 ML: at 06:36

## 2019-09-11 RX ADMIN — LISINOPRIL 40 MG: 20 TABLET ORAL at 08:28

## 2019-09-11 RX ADMIN — LEVOTHYROXINE SODIUM 100 MCG: 100 TABLET ORAL at 06:36

## 2019-09-11 NOTE — PROGRESS NOTES
Discharge order noted. Patient cleared by Dr. Oscar Langley and neurology. PIV x2 removed. Patient verbalizes understanding of discharge instructions and prescription and follow up. Patient called daughter, who is bringing clothing to put on and will be transport home. Patient denies discomfort, is awaiting daughter.

## 2019-09-11 NOTE — DISCHARGE SUMMARY
Physician Discharge Summary     Patient ID:  Malgorzata Carlos  [id number]  [de-identified] y.o.  1940    Admit date: 9/10/2019    Discharge date: 9/11/2019    Admission Diagnoses: CVA (cerebral vascular accident) [facility]) [I63.9]    Principal Discharge Diagnoses:    Acute CVA    OTHER PROBLEMS ADDRESSEDS  Principal Diagnosis CVA (cerebral vascular accident) ([address]                                            Principal Problem:    CVA (cerebral vascular accident) ([address] (6/21/2018)    Active Problems:    Hypothyroidism due to acquired atrophy of thyroid (11/18/2015)      Essential hypertension (11/18/2015)      Type 2 diabetes mellitus with diabetic nephropathy ([address] (11/18/2015)      BPH (benign prostatic hyperplasia) (6/21/2018)       Patient Active Problem List   Diagnosis Code    Acquired hypothyroidism E03.9    Obesity E66.9    Hypothyroidism due to acquired atrophy of thyroid E03.4    Essential hypertension I10    Type 2 diabetes mellitus with diabetic nephropathy ([address] E11.21    Encounter for long-term (current) use of insulin ([address] Z79.4    CVA (cerebral vascular accident) ([address] I63.9    Hyperlipidemia E78.5    BPH (benign prostatic hyperplasia) N40.0    Nausea and vomiting R11.2    Epigastric abdominal pain R10.13    Acute on chronic renal failure (HCC) N17.9, N18.9    UTI (urinary tract infection) N39.0         Hospital Course:   Mr. Shadia Patino is a pleasant [de-identified] yo WM w/ hx of HTN, DM, CKD, CVA w/ L hemianopsia presenting with blurred vision,found to have CVA     Acute CVA: MRI brain does confirm acute L occipital CVA. ASA changed to Plavix by neurology. No other deficits. Stable for discharge. Lipids well controlled. Advised close outpatient follow up      Pt discharged in improved and stable condition. Procedures performed: see above    Imaging studies:   MRI brain  Acute left occipital ischemic infarct. Remote right parieto-occipital infarct. No masses.  Atrophy and white matter changes are compatible with age. CTA H&N  No significant vascular abnormalities demonstrated. .     TTE   · Left Ventricle: Normal cavity size and wall thickness. Low normal systolic dysfunction. Estimated left ventricular ejection fraction is 51 - 55%. Age-appropriate left ventricular diastolic function. · Left Atrium: Mildly dilated left atrium. · Aortic Valve: Aortic valve leaflet calcification present. · Mitral Valve: Trace mitral valve regurgitation. · Aorta: Mild sinuses of Valsalva dilatation. PCP: León Wagner MD    Consults: Neurology    Discharge Exam:  Patient Vitals for the past 24 hrs:   Temp Pulse Resp BP SpO2   09/11/19 0755 97.6 °F (36.4 °C) (!) 58 18 147/79 96 %   09/11/19 0718  65      09/11/19 0338 97.7 °F (36.5 °C) 70 20 134/82 98 %   09/10/19 2344 97.5 °F (36.4 °C) 61 20 154/57 97 %   09/10/19 2317  64          GEN: NAD. Pleasant   CV: RRR  RESP: CTAB  NEURO: L visual deficit (unchanged) otherwise CNs in tact. No focal weakness or numbness. A&O x 4. Disposition: home    Patient Instructions:   Current Discharge Medication List      START taking these medications    Details   clopidogrel (PLAVIX) 75 mg tab Take 1 Tab by mouth daily. Qty: 30 Tab, Refills: 0         CONTINUE these medications which have NOT CHANGED    Details   insulin aspart U-100 (NOVOLOG) 100 unit/mL (3 mL) inpn 15 Units by SubCUTAneous route Before breakfast, lunch, and dinner. TRESIBA FLEXTOUCH U-100 100 unit/mL (3 mL) inpn 60 Units by SubCUTAneous route nightly. escitalopram oxalate (LEXAPRO) 10 mg tablet Take 10 mg by mouth daily. cyanocobalamin (VITAMIN B-12) 100 mcg tablet Take 100 mcg by mouth nightly. levothyroxine (SYNTHROID) 100 mcg tablet Take 100 mcg by mouth Daily (before breakfast). fenofibrate micronized (LOFIBRA) 134 mg capsule Take 134 mg by mouth nightly. Refills: 3      lisinopril (PRINIVIL, ZESTRIL) 40 mg tablet Take 40 mg by mouth daily.       omega-3 fatty acids-vitamin e (FISH OIL) 1,000 mg cap Take 1 Cap by mouth three (3) times daily. STOP taking these medications       aspirin delayed-release 81 mg tablet Comments:   Reason for Stopping:               Activity: See discharge instructions  Diet: See discharge instructions  Wound Care: See discharge instructions    Follow-up Information     Follow up With Specialties Details Why Contact Info    Costa Price MD Internal Medicine In 5 days  [address]  In 2 weeks post-stroke follow up [facility]  [phone]          I spent 35 minutes on this discharge.     Signed:  Maria De Jesus Whalen MD  9/11/2019  8:16 AM    CC: PCP Dr. Mary Sol

## 2019-09-11 NOTE — ROUTINE PROCESS
Bedside shift change report given to Concetta Gooden RN (oncoming nurse) by Nimco Marrero RN (offgoing nurse). Report included the following information SBAR, Kardex, Intake/Output, Recent Results and Quality Measures.

## 2019-09-11 NOTE — DISCHARGE INSTRUCTIONS
HOSPITALIST DISCHARGE INSTRUCTIONS  NAME: Jessica Stanton   :  1940   MRN:  [id number]     Date/Time:  2019 8:14 AM    ADMIT DATE: 9/10/2019     DISCHARGE DATE: 2019     PRINCIPAL DISCHARGE DIAGNOSES:  stroke    MEDICATIONS:  · It is important that you take the medication exactly as they are prescribed. Note the changes and additions to your medications. Be sure you understand these changes before you are discharged today. · Keep your medication in the bottles provided by the pharmacist and keep a list of the medication names, dosages, and times to be taken in your wallet. · Do not take other medications without consulting your doctor. Pain Management: per above medications    What to do at Home    Recommended diet:  Low fat, Low cholesterol    Recommended activity: Activity as tolerated    If you experience any of the following symptoms then please call your primary care physician or return to the emergency room if you cannot get hold of your doctor:  Severe headache, visual changes, slurred speech, facial droop, dizziness, confusion, weakness, numbness, or other severe concerning symptoms that brought you to the hospital in the first place    Follow Up: Follow-up Information     Follow up With Specialties Details Why Contact Info    Fran Junior MD Internal Medicine In 5 days  [address]  In 2 weeks post-stroke follow up [facility]  [phone]            Information obtained by :  I understand that if any problems occur once I am at home I am to contact my physician. I understand and acknowledge receipt of the instructions indicated above.                                                                                                                                            Physician's or R.N.'s Signature Date/Time                                                                                                                                              Patient or Representative Signature                                                          Date/Time

## 2019-09-11 NOTE — PROGRESS NOTES
[facility]: [id number] [facility]  [address]  [phone]        Name:   July Cerda   Medical record #: [id number]  Admission Date: 9/10/2019   Who Consulted: Dr. Halley Garcia for Consult:  Rule out stroke  Subjective:   Overnight events:    No acute events, pt is anxious for discharge      Objective:   EEG:            Care Plan discussed with:  Patient x   Family    RN    Care Manager    Consultant/Specialist:         Thank you for allowing the Neurology Service the pleasure of participating in the care of your patient. This patient will be discussed with my collaborating care team physician,  Dr. Marko Ascencio and he may have further recommendations regarding this patient's care    Antonia Presley, ACNP-BC  ====================     Attending Attestation:         NEUROLOGY NOTE ADDENDUM:     9/10/2019        I have reviewed the documentation provided by the nurse practitioner, discussed her findings, clinical impression, and the proposed management plans with regards to this patient's encounter. I have personally performed a face to face diagnostic evaluation on this patient. My findings are as follows:        July Cerda is a [de-identified] y.o. male who  has a past medical history of BPH (benign prostatic hyperplasia), Diabetes ([address] HTN (hypertension), Hyperlipidemia, Thyroid disease, and Type II or unspecified type diabetes mellitus without mention of complication, uncontrolled. He also has no past medical history of Difficult intubation. who presents for evaluation of blurred vision.        Patient came in with acute onset of complete bilateral vision loss lasting for 2-3 mins when he stood up. Patient has a baseline left homonymous hemianopia from previous R occipital stroke. His admission Bp was 161/71. 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     He was seen by our service in June of 2018 for right occipital stroke.   He was discharged home on ASA, which was stopped at some point due to a gastric ulcer.     Patient doing okay with no new event on Plavix 75.        Exam:  Visit Vitals  /79 (BP 1 Location: Left arm, BP Patient Position: Sitting)   Pulse (!) 58   Temp 97.6 °F (36.4 °C)   Resp 18   Ht 5' 11\" (1.803 m)   Wt 99.8 kg (220 lb)   SpO2 96%   BMI 30.68 kg/m²       Gen: Awake, alert, follows commands  Appropriate appearance, normal speech. Oriented to all spheres. (+) L visual field deficit  Full eyes movement, with no nystagmus, no diplopia, no ptosis. Normal gag and swallow. All remaining cranial nerves were normal  Motor function: 5/5 in all extremities  Sensory: intact to LT, PP and JPS  Good FTN and HTS   Gait: Deferred        Assessment  Transient visual disturbance; MRI brain did show Acute left occipital stroke and his remote right parieto-occipital stroke. Baseline L homonymous hemianopsia due to previous R occipital stroke     Plan  1) Discussed MRI brain confirmed that he had a new L occipital stroke, more likely because he was not taking his ASA  2) Now on Plavix 75 every day instead of ASA due to history of gastric ulcer  3) LDL 61. 4     Thank you for the consultation.        Magnus Lacy MD  Diplomate, American Board of Psychiatry and Neurology  Diplomate, Neuromuscular Medicine  Diplomate, American Board of Electrodiagnostic Medicine                                 Assessment/Plan:      1. Transient Ischemic Attack, r/o Stroke:    · Stop ASA and start Plavix  · Will need Plavix at discharge  · Neurochecks:  Every 4 hours  · Blood Sugar Goal:  140-180  · BP Goal: Less than 160  · Telemetry for at least 24 hours     Stroke work up  · A1C:  11.7  · LDL:  61.4  · TTE:  Low normal systolic dysfunction. Estimated left ventricular ejection fraction is 51 - 55%  · MRI:  Acute left occipital ischemic infarct. Remote right parieto-occipital infarct. No masses.  Atrophy and white matter changes are compatible with age.  · Carotid vascular imaging:  No significant stenosis     Risk factors for stroke include:  Obesity, DM, HTN, CAD, HLD, physical inactivity  · Discussed with patient    · Discussed signs/symptoms of stroke and when to call 911     3. Mobility:   · Has been/not been OOB. · PT/OT to eval for rehab     4. Diet:    · Does not need SLP, passed STAND     5.   VTE Prophylaxes:   · Per Primary team              Current Facility-Administered Medications   Medication Dose Route Frequency Provider Last Rate Last Dose    escitalopram oxalate (LEXAPRO) tablet 10 mg  10 mg Oral DAILY Mynor Mcgowan MD   10 mg at 09/11/19 0828    fenofibrate nanocrystallized (TRICOR) tablet 96 mg  96 mg Oral DAILY Mynor Mcgowan MD   96 mg at 09/10/19 0833    lisinopril (PRINIVIL, ZESTRIL) tablet 40 mg  40 mg Oral DAILY Mynor Mcgowan MD   40 mg at 09/11/19 0828    insulin glargine (LANTUS) injection 40 Units  40 Units SubCUTAneous QHS Eloisa Mcgowan MD   40 Units at 09/10/19 2231    0.9% sodium chloride infusion  50 mL/hr IntraVENous CONTINUOUS Mynor Mcgowan MD 50 mL/hr at 09/10/19 1440 50 mL/hr at 09/10/19 1440    sodium chloride (NS) flush 5-40 mL  5-40 mL IntraVENous Q8H Mynor Mcgowan MD   10 mL at 09/11/19 0636    sodium chloride (NS) flush 5-40 mL  5-40 mL IntraVENous PRN Mynor Mcgowan MD        acetaminophen (TYLENOL) tablet 650 mg  650 mg Oral Q4H PRN Eloisa Mcgowan MD        HYDROcodone-acetaminophen (NORCO) 5-325 mg per tablet 1 Tab  1 Tab Oral Q4H PRN Mynor Mcgowan MD        HYDROmorphone (DILAUDID) syringe 0.5 mg  0.5 mg IntraVENous Q4H PRN Mynor Mcgowan MD        naloxone (NARCAN) injection 0.4 mg  0.4 mg IntraVENous PRN Mynor Mcgowan MD        diphenhydrAMINE (BENADRYL) injection 12.5 mg  12.5 mg IntraVENous Q4H PRN Mynor Mcgowan MD        ondansetron (ZOFRAN) injection 4 mg  4 mg IntraVENous Q6H PRN Mynor Mcgowan MD        docusate sodium (COLACE) capsule 100 mg  100 mg Oral BID Mynor Mcgowan MD   100 mg at 09/11/19 0828    sodium chloride (NS) flush 5-40 mL  5-40 mL IntraVENous Q8H Mynor Mcgowan MD   10 mL at 09/11/19 0636    sodium chloride (NS) flush 5-40 mL  5-40 mL IntraVENous PRN Mynor Mcgowan MD        glucose chewable tablet 16 g  4 Tab Oral PRN Mynor Mcgowan MD        glucagon (GLUCAGEN) injection 1 mg  1 mg IntraMUSCular PRN Mynor Mcgowan MD        dextrose 10% infusion 0-250 mL  0-250 mL IntraVENous PRN Mynor Mcgowan MD        insulin lispro (HUMALOG) injection   SubCUTAneous AC&HS Mynor Mcgowan MD   Stopped at 09/11/19 0730    clopidogrel (PLAVIX) tablet 75 mg  75 mg Oral DAILY Aniket BEACH NP   75 mg at 09/11/19 0828    levothyroxine (SYNTHROID) tablet 100 mcg  100 mcg Oral ACB Sammy Varela MD   100 mcg at 09/11/19 1163     Current Outpatient Medications   Medication Sig Dispense Refill    clopidogrel (PLAVIX) 75 mg tab Take 1 Tab by mouth daily. 30 Tab 0    insulin aspart U-100 (NOVOLOG) 100 unit/mL (3 mL) inpn 15 Units by SubCUTAneous route Before breakfast, lunch, and dinner.  TRESIBA FLEXTOUCH U-100 100 unit/mL (3 mL) inpn 60 Units by SubCUTAneous route nightly.  escitalopram oxalate (LEXAPRO) 10 mg tablet Take 10 mg by mouth daily.  cyanocobalamin (VITAMIN B-12) 100 mcg tablet Take 100 mcg by mouth nightly.  levothyroxine (SYNTHROID) 100 mcg tablet Take 100 mcg by mouth Daily (before breakfast).  fenofibrate micronized (LOFIBRA) 134 mg capsule Take 134 mg by mouth nightly. 3    lisinopril (PRINIVIL, ZESTRIL) 40 mg tablet Take 40 mg by mouth daily.  omega-3 fatty acids-vitamin e (FISH OIL) 1,000 mg cap Take 1 Cap by mouth three (3) times daily.          Recent Results (from the past 24 hour(s))   GLUCOSE, POC    Collection Time: 09/10/19  3:58 PM   Result Value Ref Range    Glucose (POC) 274 (H) 65 - 100 mg/dL    Performed by Lisa Ford (PCT)    GLUCOSE, POC    Collection Time: 09/10/19  9:18 PM   Result Value Ref Range    Glucose (POC) 224 (H) 65 - 100 mg/dL    Performed by Kenneth Thacker METABOLIC PANEL, BASIC    Collection Time: 19  2:57 AM   Result Value Ref Range    Sodium 139 136 - 145 mmol/L    Potassium 3.8 3.5 - 5.1 mmol/L    Chloride 108 97 - 108 mmol/L    CO2 31 21 - 32 mmol/L    Anion gap 0 (L) 5 - 15 mmol/L    Glucose 75 65 - 100 mg/dL    BUN 25 (H) 6 - 20 MG/DL    Creatinine 1.53 (H) 0.70 - 1.30 MG/DL    BUN/Creatinine ratio 16 12 - 20      GFR est AA 53 (L) >60 ml/min/1.73m2    GFR est non-AA 44 (L) >60 ml/min/1.73m2    Calcium 8.5 8.5 - 10.1 MG/DL   CBC W/O DIFF    Collection Time: 19  2:57 AM   Result Value Ref Range    WBC 8.8 4.1 - 11.1 K/uL    RBC 4.44 4.10 - 5.70 M/uL    HGB 12.3 12.1 - 17.0 g/dL    HCT 38.4 36.6 - 50.3 %    MCV 86.5 80.0 - 99.0 FL    MCH 27.7 26.0 - 34.0 PG    MCHC 32.0 30.0 - 36.5 g/dL    RDW 13.8 11.5 - 14.5 %    PLATELET 071 428 - 944 K/uL    MPV 11.2 8.9 - 12.9 FL    NRBC 0.0 0  WBC    ABSOLUTE NRBC 0.00 0.00 - 0.01 K/uL   MAGNESIUM    Collection Time: 19  2:57 AM   Result Value Ref Range    Magnesium 2.1 1.6 - 2.4 mg/dL   PHOSPHORUS    Collection Time: 19  2:57 AM   Result Value Ref Range    Phosphorus 3.0 2.6 - 4.7 MG/DL   HEPATIC FUNCTION PANEL    Collection Time: 19  2:57 AM   Result Value Ref Range    Protein, total 6.7 6.4 - 8.2 g/dL    Albumin 3.3 (L) 3.5 - 5.0 g/dL    Globulin 3.4 2.0 - 4.0 g/dL    A-G Ratio 1.0 (L) 1.1 - 2.2      Bilirubin, total 0.4 0.2 - 1.0 MG/DL    Bilirubin, direct 0.1 0.0 - 0.2 MG/DL    Alk.  phosphatase 53 45 - 117 U/L    AST (SGOT) 16 15 - 37 U/L    ALT (SGPT) 21 12 - 78 U/L   GLUCOSE, POC    Collection Time: 19  6:43 AM   Result Value Ref Range    Glucose (POC) 90 65 - 100 mg/dL    Performed by Cb [place]        Visit Vitals  /79 (BP 1 Location: Left arm, BP Patient Position: Sitting)   Pulse (!) 58   Temp 97.6 °F (36.4 °C)   Resp 18   Ht 5' 11\" (1.803 m)   Wt 99.8 kg (220 lb)   SpO2 96%   BMI 30.68 kg/m²      O2 Device: Room air    Temp (24hrs), Av.5 °F (36.4 °C), Min:97.2 °F (36.2 °C), Max:97.7 °F (36.5 °C)    No intake/output data recorded.    09/09 1901 - 09/11 0700  In: -   Out: 2050 [Urine:2050]

## 2019-10-17 ENCOUNTER — OFFICE VISIT (OUTPATIENT)
Dept: NEUROLOGY | Age: 79
End: 2019-10-17

## 2019-10-17 VITALS
WEIGHT: 220 LBS | HEIGHT: 71 IN | BODY MASS INDEX: 30.8 KG/M2 | HEART RATE: 81 BPM | SYSTOLIC BLOOD PRESSURE: 122 MMHG | DIASTOLIC BLOOD PRESSURE: 82 MMHG | OXYGEN SATURATION: 97 %

## 2019-10-17 DIAGNOSIS — R27.8 SENSORY ATAXIA: ICD-10-CM

## 2019-10-17 DIAGNOSIS — I69.90 LATE EFFECTS OF CVA (CEREBROVASCULAR ACCIDENT): Primary | ICD-10-CM

## 2019-10-17 DIAGNOSIS — H54.7 VISUAL FIELD LOSS FOLLOWING CEREBROVASCULAR ACCIDENT: ICD-10-CM

## 2019-10-17 DIAGNOSIS — E11.42 DIABETIC PERIPHERAL NEUROPATHY (HCC): ICD-10-CM

## 2019-10-17 DIAGNOSIS — I69.398 VISUAL FIELD LOSS FOLLOWING CEREBROVASCULAR ACCIDENT: ICD-10-CM

## 2019-10-17 DIAGNOSIS — R26.9 GAIT DISTURBANCE: ICD-10-CM

## 2019-10-17 RX ORDER — CLOPIDOGREL BISULFATE 75 MG/1
75 TABLET ORAL DAILY
Qty: 30 TAB | Refills: 5 | Status: SHIPPED | OUTPATIENT
Start: 2019-10-17 | End: 2020-05-28

## 2019-10-17 NOTE — PROGRESS NOTES
[address] [facility],[address]  [address]   [place] [address] [place] [facility]   [phone] Office   [phone] Fax           Date:  10/17/19     Name:  Mitesh Castano  :  1940  MRN:  [id number]     PCP:  Reba Perdomo MD    Chief Complaint   Patient presents with   [facility] Follow Up    Stroke       HISTORY OF PRESENT ILLNESS: This is an 77 yo right handed, , male who presents today for follow up after recent hospitalization for acute CVA. He presented with acute onset bilateral vision loss lasting for 2-3 mins when he stood up. 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 With this admission, he was found to have had an acute L occipital stroke on MRI. He continues to have visual field deficits and his balance is off. Otherwise, he has been feeling well. No new stroke like symptoms. Except as noted above, denies  fever, chills, cough. No CP or SOB. No dysuria, loss of bowel or bladder control. No Weight loss. Appetite good. Sleeping well. No sweats. No edema. No bruising or bleeding. No nausea or vomit. No diarrhea. No frequency, urgency, No depressive sxs. No anxiety. Denies sore throat, nasal congestion, nasal discharge, epistaxis, tinnitus, hearing loss, back pain, muscle pain, or joint pain. Current Outpatient Medications   Medication Sig    clopidogrel (PLAVIX) 75 mg tab Take 1 Tab by mouth daily.  insulin aspart U-100 (NOVOLOG) 100 unit/mL (3 mL) inpn 15 Units by SubCUTAneous route Before breakfast, lunch, and dinner.  TRESIBA FLEXTOUCH U-100 100 unit/mL (3 mL) inpn 60 Units by SubCUTAneous route nightly.  escitalopram oxalate (LEXAPRO) 10 mg tablet Take 10 mg by mouth daily.  cyanocobalamin (VITAMIN B-12) 100 mcg tablet Take 100 mcg by mouth nightly.     levothyroxine (SYNTHROID) 100 mcg tablet Take 100 mcg by mouth Daily (before breakfast).  fenofibrate micronized (LOFIBRA) 134 mg capsule Take 134 mg by mouth nightly.  lisinopril (PRINIVIL, ZESTRIL) 40 mg tablet Take 40 mg by mouth daily.  omega-3 fatty acids-vitamin e (FISH OIL) 1,000 mg cap Take 1 Cap by mouth three (3) times daily. No current facility-administered medications for this visit.       No Known Allergies  Past Medical History:   Diagnosis Date    BPH (benign prostatic hyperplasia)     Diabetes ([address]     HTN (hypertension)     Hyperlipidemia     Thyroid disease     Hypo thyroid    Type II or unspecified type diabetes mellitus without mention of complication, uncontrolled      Past Surgical History:   Procedure Laterality Date    HX ENDOSCOPY      HX ORTHOPAEDIC      Left shoulder     Social History     Socioeconomic History    Marital status:      Spouse name: Not on file    Number of children: Not on file    Years of education: Not on file    Highest education level: Not on file   Occupational History    Not on file   Social Needs    Financial resource strain: Not on file    Food insecurity:     Worry: Not on file     Inability: Not on file    Transportation needs:     Medical: Not on file     Non-medical: Not on file   Tobacco Use    Smoking status: Former Smoker    Smokeless tobacco: Never Used    Tobacco comment: Smoked 50 years ago   Substance and Sexual Activity    Alcohol use: No    Drug use: No    Sexual activity: Not on file   Lifestyle    Physical activity:     Days per week: Not on file     Minutes per session: Not on file    Stress: Not on file   Relationships    Social connections:     Talks on phone: Not on file     Gets together: Not on file     Attends Denominational service: Not on file     Active member of club or organization: Not on file     Attends meetings of clubs or organizations: Not on file     Relationship status: Not on file    Intimate partner violence:     Fear of current or ex partner: Not on file Emotionally abused: Not on file     Physically abused: Not on file     Forced sexual activity: Not on file   Other Topics Concern    Not on file   Social History Narrative    ** Merged History Encounter **          Family History   Problem Relation Age of Onset    Diabetes Mother     Cancer Mother     Diabetes Father     Diabetes Sister        PHYSICAL EXAMINATION:    Visit Vitals  /82   Pulse 81   Ht 5' 11\" (1.803 m)   Wt 99.8 kg (220 lb)   SpO2 97%   BMI 30.68 kg/m²     General:  Well defined, nourished, and groomed individual in no acute distress. Neck: Supple, nontender, no bruits, no pain with resistance to active range of motion. Heart: Regular rate and rhythm, no murmurs, rub, or gallop. Normal S1S2. Lungs:  Clear to auscultation bilaterally with equal chest expansion, no cough, no wheeze  Musculoskeletal:  Extremities revealed no edema and had full range of motion of joints. Psych:  Good mood and bright affect    NEUROLOGICAL EXAMINATION:     Mental Status:   Alert and oriented to person, place, and time with recent and remote memory intact. Attention span and concentration are normal. Speech is fluent with a full fund of knowledge. Cranial Nerves:  I: smell Not tested   II: visual fields Left visual deficit of the peripheral vision   II: pupils Equal, round, reactive to light   II: optic disc No papilledema   III,VII: ptosis none   III,IV,VI: extraocular muscles  Full ROM   V: mastication normal   V: facial light touch sensation  normal   VII: facial muscle function   symmetric   VIII: hearing symmetric   IX: soft palate elevation  normal   XI: trapezius strength  5/5   XI: sternocleidomastoid strength 5/5   XI: neck flexion strength  5/5   XII: tongue  midline     Motor Examination: Normal tone, bulk, and strength, 5/5 muscle strength throughout. No cogwheel rigidity or clonus present. Sensory exam:  Decreased temperature sensation in the left arm.  Bilateral stocking glove sensory loss in the lower extremities bilaterally, left greater than right    Coordination:  Finger to nose and rapid arm movement testing was normal.   No resting or intention tremor    Gait and Station:  Sensory ataxia in combination of reaching for the wall due to a loss of vision and not knowing where the wall/door jam is. Normal arm swing. No Rhomberg or pronator drift. No muscle wasting or fasiculations noted. Reflexes:  DTRs 2+ throughout except absent ankle jerk reflexes     ASSESSMENT AND PLAN    ICD-10-CM ICD-9-CM    1. Late effects of CVA (cerebrovascular accident) I69.90 438.9 clopidogrel (PLAVIX) 75 mg tab      REFERRAL TO HOME HEALTH      PLATELET FUNCTION, VERIFY NOW P2Y12   2. Visual field loss following cerebrovascular accident I69.398 438.7 REFERRAL TO HOME HEALTH    H54.7     3. Gait disturbance R26.9 781.2 REFERRAL TO HOME HEALTH   4. Sensory ataxia R27.8 781.3 REFERRAL TO HOME HEALTH   5. Diabetic peripheral neuropathy (HCC) E11.42 250.60 REFERRAL TO HOME HEALTH     357.2      Discussed secondary stroke prevention as well as signs and symptoms of stroke, BE-FAST, and when to call for EMS. Stressed importance of recognition and early intervention. LDL goal less than 70 per secondary stroke guidelines. Continue with Plavix for secondary stroke prevention. Will check a P2Y12 to ensure that he is a responder. We discussed glycemic control and I agree with the endocrinologist who saw him in the hospital that an appropriate A1C goal of 8.5-9% is appropriate. In the ACCORD trial, it was found that patients with a median HgBA1C 8.1% who underwent intensive Hyperglycemic control, a goal of <6% vs 7.9% as a means of decreasing cardiovascular risk was ended 17 months early due to an increase in all cause mortality in the intensive treatment group and it was unable to demonstrate a decrease in stroke risk with decreased A1C.  Discussed lifestyle modification and importance of exercise and appropriate diet, weight management, and management of comorbid disease with appropriate follow up visits with primary care and other healthcare providers. Jumana Marie

## 2019-10-17 NOTE — PROGRESS NOTES
Pt was seen on 9/10/19 for a stroke. Pt was placed on aspirin. Pt states he feels ok. Pt states his balance is still off and its hard for him to walk straight.

## 2020-05-28 DIAGNOSIS — I69.90 LATE EFFECTS OF CVA (CEREBROVASCULAR ACCIDENT): ICD-10-CM

## 2020-05-28 RX ORDER — CLOPIDOGREL BISULFATE 75 MG/1
TABLET ORAL
Qty: 90 TAB | Refills: 1 | Status: SHIPPED | OUTPATIENT
Start: 2020-05-28 | End: 2020-12-21

## 2021-01-01 NOTE — PERIOP NOTES
1205    TRANSFER - IN REPORT:    Verbal report received from Ghada Garcia [facility]. on Jude Perez  being received from [phone] for ordered procedure      Report consisted of patients Situation, Background, Assessment and   Recommendations(SBAR). Information from the following report(s) SBAR was reviewed with the receiving nurse. Opportunity for questions and clarification was provided. Assessment completed upon patients arrival to unit and care assumed. Pt put in for transport. 1225  Telemetry box #30 put on standby and pt put on monitor #3 in Endo. 1253  Anesthesia staff at patient's bedside administering anesthesia and monitoring patients vital signs throughout procedure. See anesthesia note. 1301  Endoscope was pre-cleaned at bedside immediately following procedure by DANETTE Evans.    0807  Patient tolerated procedure without problems. Abdomen soft and patient arousable and voices no complaints Report received from CRNA, see anesthesia note. Patient transported to endoscopy recovery area. Report given to URIEL ARANA RN.      [address] REPORT:    Verbal report given to Peña Preston RN on Jude Perez  being transferred to [address] [place] [address] for routine progression of care       Report consisted of patients Situation, Background, Assessment and   Recommendations(SBAR). Information from the following report(s) SBAR and Procedure Summary was reviewed with the receiving nurse. Lines:   Peripheral IV 06/21/18 Right Forearm (Active)   Site Assessment Clean, dry, & intact 6/22/2018 12:39 PM   Phlebitis Assessment 0 6/22/2018 12:39 PM   Infiltration Assessment 0 6/22/2018 12:39 PM   Dressing Status Clean, dry, & intact 6/22/2018 12:39 PM   Dressing Type Transparent;Tape 6/22/2018 12:39 PM   Hub Color/Line Status Pink;Flushed; Infusing 6/22/2018 12:39 PM   Action Taken Dressing reinforced 6/22/2018 12:39 PM   Alcohol Cap Used Yes 6/22/2018 12:39 PM        Opportunity for questions and clarification was provided. Patient transported with:  Telemetry monitor (# 30) , pt chart,patent IV. medium/compressible

## 2022-12-13 NOTE — PROGRESS NOTES
Bladder scanner showed 0ml in bladder after voiding Matthieu Bishop [facility]  [address] [facility], [place], [address] [facility]  [phone]      Medical Progress Note      NAME: Ahsan Richard   :  1940  MRM:  [id number]    Date/Time: 2018  10:25 AM       Assessment and Plan:     Acute on chronic CVA (cerebral vascular accident) ([facility] (2018). CT head with acute occipital stroke. MRI/MRA head/neck show noted stroke and posterior circulation AS vascular disease. Continue ASA but at 81 mg (discussed with GI, remain on 81 mg until follow-up with them and possible second look EGD). LDL is 35 but TG remain elevated, okay with no statin as it is below goal for CVA guidelines, remain on tricor for TG elevation. PT/OT/SLP. Appreciate neurology evaluation. TTE shows good LVEF and no shunt. CM consult for [facility] v. [facility] v.      Epigastric abdominal pain (2018)/ Nausea and vomiting (2018). EGD reviewed with Dr. Brian Hammonds. GROSSLY abnormal with duodenal bulb ulceration, severe/necrotic esophagitis. Continue BID PPI. Carafate. Clears and advancing diet per GI. Appreciate GI consult and biopsy/pathology pending.       Acute on chronic renal failure, stage III likely due to diabetes mellitus ([facility] (2018). This is likely secondary to persistent vomiting and poor PO intake. Improved with IVFs.     Hypothyroidism due to acquired atrophy of thyroid (2015). Continue synthroid. TSH wnl     Essential hypertension (2015). Continue home atenolol. Resume lisniopril as he is at his baseline creatinine     Type 2 diabetes mellitus with [facility] (2015). 9.6% A1C. Reduced lantus to 20 units due to hypoglycemia, monitor and titrate as needed. Continue SSI.     Hyperlipidemia (2018). LDL is in 30s, TG remains elevated but total cholesterol nml. Continue tricor, no indication for statin     BPH (benign prostatic hyperplasia) (2018). On terazosin      Obesity (2015).  Would benefit from weight loss     Leukocytosis. Afebrile. UA negative. No ABx for now. Subjective:     Chief Complaint:  Patient seen and examined. Chart reviewed. Patient reports that he feels well. No abd pain    ROS:  (bold if positive, if negative)              Objective:     Last 24hrs VS reviewed since prior progress note.  Most recent are:    Visit Vitals    /64 (BP 1 Location: Left arm, BP Patient Position: Sitting)    Pulse 61    Temp 97.8 °F (36.6 °C)    Resp 18    Ht 5' 11\" (1.803 m)    Wt 93.5 kg (206 lb 2.1 oz)    SpO2 96%    BMI 28.75 kg/m2     SpO2 Readings from Last 6 Encounters:   06/25/18 96%   01/19/18 100%   01/17/18 97%   06/27/16 95%   03/18/16 98%   11/18/15 98%    O2 Flow Rate (L/min): 2 l/min       Intake/Output Summary (Last 24 hours) at 06/25/18 1024  Last data filed at 06/25/18 0746   Gross per 24 hour   Intake             1430 ml   Output             1050 ml   Net              380 ml        Physical Exam:    Gen:  Well-developed, well-nourished, in no acute distress  HEENT:  Pink conjunctivae, PERRL, hearing intact to voice, moist mucous membranes  Neck:  Supple, without masses, thyroid non-tender  Resp:  No accessory muscle use, clear breath sounds without wheezes rales or rhonchi  Card:  No murmurs, normal S1, S2 without thrills, bruits or peripheral edema  Abd:  Soft, non-tender, non-distended, normoactive bowel sounds are present, no palpable organomegaly and no detectable hernias  Lymph:  No cervical or inguinal adenopathy  Musc:  No cyanosis or clubbing  Skin:  No rashes or ulcers, skin turgor is good  Neuro:  Cranial nerves are grossly intact, no focal motor weakness, follows commands appropriately  Psych:  Good insight, oriented to person, place and time, alert    Telemetry reviewed:   normal sinus rhythm  __________________________________________________________________  Medications Reviewed: (see below)  Medications:     Current Facility-Administered Medications   Medication Dose Route Frequency    potassium, sodium phosphates (NEUTRA-PHOS) packet 2 Packet  2 Packet Oral BID    lisinopril (PRINIVIL, ZESTRIL) tablet 40 mg  40 mg Oral DAILY    insulin lispro (HUMALOG) injection   SubCUTAneous AC&HS    pantoprazole (PROTONIX) tablet 40 mg  40 mg Oral ACB&D    insulin glargine (LANTUS) injection 20 Units  20 Units SubCUTAneous QHS    miSOPROStol (CYTOTEC) tablet 200 mcg  200 mcg Oral BID    sucralfate (CARAFATE) 100 mg/mL oral suspension 1 g  1 g Oral BID    aspirin chewable tablet 81 mg  81 mg Oral DAILY    sodium chloride (NS) flush 5-10 mL  5-10 mL IntraVENous Q8H    sodium chloride (NS) flush 5-10 mL  5-10 mL IntraVENous PRN    acetaminophen (TYLENOL) tablet 650 mg  650 mg Oral Q4H PRN    Or    acetaminophen (TYLENOL) solution 650 mg  650 mg Per NG tube Q4H PRN    Or    acetaminophen (TYLENOL) suppository 650 mg  650 mg Rectal Q4H PRN    ondansetron (ZOFRAN) injection 4 mg  4 mg IntraVENous Q4H PRN    heparin (porcine) injection 5,000 Units  5,000 Units SubCUTAneous Q8H    glucose chewable tablet 16 g  4 Tab Oral PRN    dextrose (D50W) injection syrg 12.5-25 g  12.5-25 g IntraVENous PRN    glucagon (GLUCAGEN) injection 1 mg  1 mg IntraMUSCular PRN    prochlorperazine (COMPAZINE) injection 10 mg  10 mg IntraVENous Q6H PRN    escitalopram oxalate (LEXAPRO) tablet 10 mg  10 mg Oral DAILY    levothyroxine (SYNTHROID) tablet 75 mcg  75 mcg Oral ACB    terazosin (HYTRIN) capsule 2 mg  2 mg Oral QHS    fenofibrate nanocrystallized (TRICOR) tablet 145 mg  145 mg Oral DAILY    atenolol (TENORMIN) tablet 50 mg  50 mg Oral DAILY        Lab Data Reviewed: (see below)  Lab Review:     Recent Labs      06/25/18   0135  06/24/18   0808  06/23/18   0501   WBC  9.5  7.8  11.9*   HGB  11.2*  12.5  11.8*   HCT  33.8*  37.5  36.0*   PLT  262  272  258     Recent Labs      06/25/18   0135  06/24/18   0808  06/23/18   0501   NA  140  140  141   K  3.3*  3.7  3.4*   CL  104  105  107   CO2 28  27  24   GLU  134*  106*  64*   BUN  21*  23*  34*   CREA  1.67*  1.59*  1.55*   CA  8.3*  8.3*  8.2*   MG  1.7   --   1.9   PHOS  2.7   --   2.8     Lab Results   Component Value Date/Time    Glucose (POC) 92 06/25/2018 07:28 AM    Glucose (POC) 228 (H) 06/24/2018 09:17 PM    Glucose (POC) 127 (H) 06/24/2018 04:34 PM    Glucose (POC) 292 (H) 06/24/2018 11:23 AM    Glucose (POC) 80 06/24/2018 04:36 AM     No results for input(s): PH, PCO2, PO2, HCO3, FIO2 in the last 72 hours. No results for input(s): INR in the last 72 hours. No lab exists for component: Dario Gutierres  All Micro Results     Procedure Component Value Units Date/Time    URINE CULTURE HOLD SAMPLE [[id number]] Collected:  06/21/18 1600    Order Status:  Completed Specimen:  Urine from Serum Updated:  06/21/18 1602     Urine culture hold         URINE ON HOLD IN MICROBIOLOGY DEPT FOR 3 DAYS. IF UNPRESERVED URINE IS SUBMITTED, IT CANNOT BE USED FOR ADDITIONAL TESTING AFTER 24 HRS, RECOLLECTION WILL BE REQUIRED. I have reviewed notes of prior 24hr.     Other pertinent lab: None    Total time spent with patient: 25 min                  Care Plan discussed with: Patient, Nursing Staff, Consultant/Specialist and >50% of time spent in counseling and coordination of care    Discussed:  Care Plan and D/C Planning    Prophylaxis:  Hep SQ    Disposition:  [facility]/Rehab           ___________________________________________________    Attending Physician: Conrad Warner, DO
